# Patient Record
Sex: FEMALE | Race: BLACK OR AFRICAN AMERICAN | NOT HISPANIC OR LATINO | Employment: OTHER | URBAN - METROPOLITAN AREA
[De-identification: names, ages, dates, MRNs, and addresses within clinical notes are randomized per-mention and may not be internally consistent; named-entity substitution may affect disease eponyms.]

---

## 2017-10-02 ENCOUNTER — ALLSCRIPTS OFFICE VISIT (OUTPATIENT)
Dept: OTHER | Facility: OTHER | Age: 82
End: 2017-10-02

## 2017-10-02 DIAGNOSIS — E11.9 TYPE 2 DIABETES MELLITUS WITHOUT COMPLICATIONS (HCC): ICD-10-CM

## 2017-10-02 DIAGNOSIS — E78.5 HYPERLIPIDEMIA: ICD-10-CM

## 2017-10-02 DIAGNOSIS — Z12.11 ENCOUNTER FOR SCREENING FOR MALIGNANT NEOPLASM OF COLON: ICD-10-CM

## 2017-10-02 DIAGNOSIS — Z12.31 ENCOUNTER FOR SCREENING MAMMOGRAM FOR MALIGNANT NEOPLASM OF BREAST: ICD-10-CM

## 2017-10-02 DIAGNOSIS — Z78.0 ASYMPTOMATIC MENOPAUSAL STATE: ICD-10-CM

## 2017-10-03 NOTE — PROGRESS NOTES
Assessment  1  Diabetes mellitus, type 2 (250 00) (E11 9)   2  Benign essential hypertension (401 1) (I10)   3  Hyperlipidemia (272 4) (E78 5)   4  Obesity (278 00) (E66 9)   5  Asymptomatic postmenopausal state (V49 81) (Z78 0)   6  Screening for colon cancer (V76 51) (Z12 11)   7  Encounter for screening mammogram for breast cancer (V76 12) (Z12 31)    Plan  Asymptomatic postmenopausal state    · * DXA BONE DENSITY SPINE HIP AND PELVIS; Status:Hold For - Scheduling; Requested  for:02Oct2017;   Diabetes mellitus, type 2    · Skye Petty MD, Poon[facility] Love (Ophthalmology) Co-Management  *  Status: Hold For -  Scheduling  Requested for: 60FUT7092  Care Summary provided  : Yes   · (1) CBC/PLT/DIFF; Status:Active; Requested CKP:85VTT4053;    · (1) COMPREHENSIVE METABOLIC PANEL; Status:Active; Requested PJZ:15GQK4805;    · (1) HEMOGLOBIN A1C; Status:Active; Requested for:02Oct2017;    · (1) MICROALBUMIN CREATININE RATIO, RANDOM URINE; Status:Active; Requested  EAF:87EFQ4777;   Encounter for screening mammogram for breast cancer    · * MAMMO SCREENING BILATERAL W CAD; Status:Hold For - Scheduling; Requested  for:02Oct2017;   Hyperlipidemia    · (1) LIPID PANEL, FASTING; Status:Active; Requested for:02Oct2017;    · (1) TSH; Status:Active; Requested UWI:47XHL2570;   Need for influenza vaccination    · Fluzone High-Dose 0 5 ML Intramuscular Suspension Prefilled Syringe  Obesity    · Keep a diary of when and what you eat ; Status:Complete;   Done: 97FOK9580   · Some eating tips that can help you lose weight ; Status:Complete;   Done: 34WVF3808   · We recommend that you bring your body mass index down to 26 ; Status:Complete;    Done: 24DBP4506  Screening for colon cancer    · (1) OCCULT BLOOD, FECAL IMMUNOCHEMICAL TEST; Status:Active; Requested  QFY:[id number];   Screening for genitourinary condition    · *VB - Urinary Incontinence Screen (Dx Z13 89 Screen for UI);  Status:Complete;   Done:  85LLS9632 01: 14PM    Discussion/Summary  Discussion Summary:   Type 2 diabetesthe patient says that her blood sugars are well controlled on the metformin and that her last hemoglobin A1c was around 6 4  Continue the same medication  Follow-up hemoglobin A1c level  pressure stable, continue same medication  pravastatin, follow-up lipid profile  was told to cut down on the calories and increase her activity  was ordered  Hemoccult was ordered for colon cancer screening  Counseling Documentation With Imm: The patient was counseled regarding instructions for management,-risk factor reductions,-risks and benefits of treatment options,-importance of compliance with treatment  Medication SE Review and Pt Understands Tx: Possible side effects of new medications were reviewed with the patient/guardian today  The treatment plan was reviewed with the patient/guardian  The patient/guardian understands and agrees with the treatment plan      Chief Complaint  Chief Complaint Free Text Note Form: establish care      Advance Directives  Advance Directive [facility]:   NO - Patient does not have an advance health care directive  History of Present Illness  Diabetes Type II (Follow-Up): The patient states she has been stable with her Type II Diabetes control since the last visit  Comorbid Illnesses: hypertension,-hyperlipidemia-and-obesity  She has no known diabetic complications  She has no significant interval events  Symptoms: The patient is currently asymptomatic  Medications: the patient is adherent with her medication regimen -She denies medication side effects  Obesity (Follow-Up): The patient is being seen for follow-up of obesity  The patient reports no change in the condition  She has had no significant interval events  The patient is currently asymptomatic  The patient is not currently on medication for this problem  Hyperlipidemia (Follow-Up): The patient states her hyperlipidemia has been stable since the last visit  Comorbid Illnesses: diabetes mellitus-and-hypertension  She has no significant interval events  Symptoms: The patient is currently asymptomatic  Medications: She denies medication side effects  Hypertension (Follow-Up): The patient presents for follow-up of essential hypertension  The patient states she has been stable with her blood pressure control since the last visit  She has no comorbid illnesses  She has no significant interval events  Symptoms: The patient is currently asymptomatic  Medications: the patient is adherent with her medication regimen -She denies medication side effects  Review of Systems  Complete-Female:   Constitutional: No fever, no chills, feels well, no tiredness, no recent weight gain or weight loss  Eyes: No complaints of eye pain, no red eyes, no eyesight problems, no discharge, no dry eyes, no itching of eyes  ENT: no complaints of earache, no loss of hearing, no nose bleeds, no nasal discharge, no sore throat, no hoarseness  Cardiovascular: No complaints of slow heart rate, no fast heart rate, no chest pain, no palpitations, no leg claudication, no lower extremity edema  Respiratory: No complaints of shortness of breath, no wheezing, no cough, no SOB on exertion, no orthopnea, no PND  Gastrointestinal: No complaints of abdominal pain, no constipation, no nausea or vomiting, no diarrhea, no bloody stools  Genitourinary: No complaints of dysuria, no incontinence, no pelvic pain, no dysmenorrhea, no vaginal discharge or bleeding  Musculoskeletal: No complaints of arthralgias, no myalgias, no joint swelling or stiffness, no limb pain or swelling  Integumentary: No complaints of skin rash or lesions, no itching, no skin wounds, no breast pain or lump  Neurological: No complaints of headache, no confusion, no convulsions, no numbness, no dizziness or fainting, no tingling, no limb weakness, no difficulty walking     Psychiatric: Not suicidal, no sleep disturbance, no anxiety or depression, no change in personality, no emotional problems  Endocrine: No complaints of proptosis, no hot flashes, no muscle weakness, no deepening of the voice, no feelings of weakness  Hematologic/Lymphatic: No complaints of swollen glands, no swollen glands in the neck, does not bleed easily, does not bruise easily  Active Problems  1  Diabetes mellitus, type 2 (250 00) (E11 9)   2  Need for influenza vaccination (V04 81) (Z23)   3  No advance directive on file (V49 89) (Z78 9)   4  Screening for genitourinary condition (V81 6) (Z13 89)    Past Medical History  1  History of Denial (799 29) (R45 89)  Active Problems And Past Medical History Reviewed: The active problems and past medical history were reviewed and updated today  Surgical History  1  History of  Section   2  History of Cholecystectomy   3  History of Neck Surgery  Surgical History Reviewed: The surgical history was reviewed and updated today  Family History  Family History    1  No pertinent family history  Family History Reviewed: The family history was reviewed and updated today  Social History   · Never a smoker   · No advance directive on file (V49 89) (Z78 9)   · No alcohol use   · Non-tobacco user (V49 89) (Z78 9)  Social History Reviewed: The social history was reviewed and updated today  The social history was reviewed and is unchanged  Current Meds   1  Calcium 600/Vitamin D 600-400 MG-UNIT Oral Tablet; Therapy: 69DER2435 to Recorded   2  HydroCHLOROthiazide 12 5 MG Oral Tablet; Therapy: 40POE6689 to Recorded   3  Lisinopril 40 MG Oral Tablet; Therapy: 33IXL0383 to Recorded   4  MetFORMIN HCl  MG Oral Tablet Extended Release 24 Hour; take one tablet by   mouth twice daily; Therapy: 61NWD6500 to (Renew:97Cnm1311) Recorded   5  Metoprolol Succinate  MG Oral Tablet Extended Release 24 Hour; Therapy: 70VMA5468 to Recorded   6   Pravastatin Sodium 20 MG Oral Tablet; Therapy: 58PLV6856 to Recorded    Allergies  1  No Known Drug Allergies  2  No Known Environmental Allergies   3  No Known Food Allergies    Vitals  Signs   Recorded: 37SVW9649 01:11PM   Heart Rate: 75  Systolic: 764, LUE, Sitting  Diastolic: 90, LUE, Sitting  Height: 5 ft 2 in  Weight: 165 lb   BMI Calculated: 30 18  BSA Calculated: 1 76  O2 Saturation: 98    Physical Exam    Constitutional   General appearance: Abnormal   obese  Head and Face   Head and face: Normal     Palpation of the face and sinuses: No sinus tenderness  Eyes   Conjunctiva and lids: No swelling, erythema or discharge  Pupils and irises: Equal, round, reactive to light  Ears, Nose, Mouth, and Throat   External inspection of ears and nose: Normal     Otoscopic examination: Tympanic membranes translucent with normal light reflex  Canals patent without erythema  Oropharynx: Normal with no erythema, edema, exudate or lesions  Neck   Neck: Supple, symmetric, trachea midline, no masses  Thyroid: Normal, no thyromegaly  Pulmonary   Respiratory effort: No increased work of breathing or signs of respiratory distress  Auscultation of lungs: Clear to auscultation  Cardiovascular   Palpation of heart: Normal PMI, no thrills  Auscultation of heart: Normal rate and rhythm, normal S1 and S2, no murmurs  Examination of extremities for edema and/or varicosities: Normal     Abdomen   Abdomen: Non-tender, no masses  Liver and spleen: No hepatomegaly or splenomegaly  Lymphatic   Palpation of lymph nodes in neck: No lymphadenopathy  Musculoskeletal   Gait and station: Normal     Skin   Skin and subcutaneous tissue: Normal without rashes or lesions  Neurologic   Cranial nerves: Cranial nerves II-XII intact  Cortical function: Normal mental status  Reflexes: 2+ and symmetric  Sensation: No sensory loss      Psychiatric   Judgment and insight: Normal     Mood and affect: Normal  Results/Data  Falls Risk Assessment (Dx Z13 89 Screen for Neurologic Disorder) [id number] 01:14PM User, Ahs     Test Name Result Flag Reference   Falls Risk      No falls in the past year     *VB - Urinary Incontinence Screen (Dx Z13 89 Screen for UI) 00DCY1162 01:14PM Dwight Huitron     Test Name Result Flag Reference   Urinary Incontinence Assessment      no per pt   sh 10/02/2017     PHQ-2 Adult Depression Screening 50LBK6615 01:13PM User, Ahs     Test Name Result Flag Reference   PHQ-2 Adult Depression Score 0     Over the last two weeks, how often have you been bothered by any of the following problems? Little interest or pleasure in doing things: Not at all - 0  Feeling down, depressed, or hopeless: Not at all - 0   PHQ-2 Adult Depression Screening Negative         Future Appointments    Date/Time Provider Specialty Site   02/05/2018 09:45 AM JOHN Barnett   Internal Medicine [facility]     Signatures   Electronically signed by : JOHN Pettit ; Oct  2 2017  5:05PM EST                       (Author)

## 2017-10-14 ENCOUNTER — APPOINTMENT (OUTPATIENT)
Dept: LAB | Facility: HOSPITAL | Age: 82
End: 2017-10-14
Payer: MEDICARE

## 2017-10-14 ENCOUNTER — TRANSCRIBE ORDERS (OUTPATIENT)
Dept: ADMINISTRATIVE | Facility: HOSPITAL | Age: 82
End: 2017-10-14

## 2017-10-14 DIAGNOSIS — E11.9 TYPE 2 DIABETES MELLITUS WITHOUT COMPLICATIONS (HCC): ICD-10-CM

## 2017-10-14 DIAGNOSIS — E78.5 HYPERLIPIDEMIA: ICD-10-CM

## 2017-10-14 LAB
ALBUMIN SERPL BCP-MCNC: 3.7 G/DL (ref 3.5–5)
ALP SERPL-CCNC: 63 U/L (ref 46–116)
ALT SERPL W P-5'-P-CCNC: 17 U/L (ref 12–78)
ANION GAP SERPL CALCULATED.3IONS-SCNC: 7 MMOL/L (ref 4–13)
AST SERPL W P-5'-P-CCNC: 9 U/L (ref 5–45)
BASOPHILS # BLD AUTO: 0.03 THOUSANDS/ΜL (ref 0–0.1)
BASOPHILS NFR BLD AUTO: 0 % (ref 0–1)
BILIRUB SERPL-MCNC: 0.5 MG/DL (ref 0.2–1)
BUN SERPL-MCNC: 14 MG/DL (ref 5–25)
CALCIUM SERPL-MCNC: 9.9 MG/DL (ref 8.3–10.1)
CHLORIDE SERPL-SCNC: 101 MMOL/L (ref 100–108)
CHOLEST SERPL-MCNC: 125 MG/DL (ref 50–200)
CO2 SERPL-SCNC: 32 MMOL/L (ref 21–32)
CREAT SERPL-MCNC: 0.85 MG/DL (ref 0.6–1.3)
CREAT UR-MCNC: 49.8 MG/DL
EOSINOPHIL # BLD AUTO: 0.1 THOUSAND/ΜL (ref 0–0.61)
EOSINOPHIL NFR BLD AUTO: 1 % (ref 0–6)
ERYTHROCYTE [DISTWIDTH] IN BLOOD BY AUTOMATED COUNT: 15.6 % (ref 11.6–15.1)
EST. AVERAGE GLUCOSE BLD GHB EST-MCNC: 137 MG/DL
GFR SERPL CREATININE-BSD FRML MDRD: 74 ML/MIN/1.73SQ M
GLUCOSE P FAST SERPL-MCNC: 104 MG/DL (ref 65–99)
HBA1C MFR BLD: 6.4 % (ref 4.2–6.3)
HCT VFR BLD AUTO: 41.2 % (ref 34.8–46.1)
HDLC SERPL-MCNC: 46 MG/DL (ref 40–60)
HGB BLD-MCNC: 13.4 G/DL (ref 11.5–15.4)
LDLC SERPL CALC-MCNC: 52 MG/DL (ref 0–100)
LYMPHOCYTES # BLD AUTO: 2.67 THOUSANDS/ΜL (ref 0.6–4.47)
LYMPHOCYTES NFR BLD AUTO: 23 % (ref 14–44)
MCH RBC QN AUTO: 25 PG (ref 26.8–34.3)
MCHC RBC AUTO-ENTMCNC: 32.5 G/DL (ref 31.4–37.4)
MCV RBC AUTO: 77 FL (ref 82–98)
MICROALBUMIN UR-MCNC: 138 MG/L (ref 0–20)
MICROALBUMIN/CREAT 24H UR: 277 MG/G CREATININE (ref 0–30)
MONOCYTES # BLD AUTO: 0.75 THOUSAND/ΜL (ref 0.17–1.22)
MONOCYTES NFR BLD AUTO: 6 % (ref 4–12)
NEUTROPHILS # BLD AUTO: 8.06 THOUSANDS/ΜL (ref 1.85–7.62)
NEUTS SEG NFR BLD AUTO: 69 % (ref 43–75)
NRBC BLD AUTO-RTO: 0 /100 WBCS
PLATELET # BLD AUTO: 315 THOUSANDS/UL (ref 149–390)
PMV BLD AUTO: 8.5 FL (ref 8.9–12.7)
POTASSIUM SERPL-SCNC: 4 MMOL/L (ref 3.5–5.3)
PROT SERPL-MCNC: 8.3 G/DL (ref 6.4–8.2)
RBC # BLD AUTO: 5.37 MILLION/UL (ref 3.81–5.12)
SODIUM SERPL-SCNC: 140 MMOL/L (ref 136–145)
TRIGL SERPL-MCNC: 134 MG/DL
TSH SERPL DL<=0.05 MIU/L-ACNC: 0.03 UIU/ML (ref 0.36–3.74)
WBC # BLD AUTO: 11.66 THOUSAND/UL (ref 4.31–10.16)

## 2017-10-14 PROCEDURE — 83036 HEMOGLOBIN GLYCOSYLATED A1C: CPT

## 2017-10-14 PROCEDURE — 36415 COLL VENOUS BLD VENIPUNCTURE: CPT

## 2017-10-14 PROCEDURE — 80061 LIPID PANEL: CPT

## 2017-10-14 PROCEDURE — 82043 UR ALBUMIN QUANTITATIVE: CPT

## 2017-10-14 PROCEDURE — 85025 COMPLETE CBC W/AUTO DIFF WBC: CPT

## 2017-10-14 PROCEDURE — 84443 ASSAY THYROID STIM HORMONE: CPT

## 2017-10-14 PROCEDURE — 82570 ASSAY OF URINE CREATININE: CPT

## 2017-10-14 PROCEDURE — 80053 COMPREHEN METABOLIC PANEL: CPT

## 2017-10-30 ENCOUNTER — GENERIC CONVERSION - ENCOUNTER (OUTPATIENT)
Dept: OTHER | Facility: OTHER | Age: 82
End: 2017-10-30

## 2017-11-11 ENCOUNTER — TRANSCRIBE ORDERS (OUTPATIENT)
Dept: ADMINISTRATIVE | Facility: HOSPITAL | Age: 82
End: 2017-11-11

## 2017-11-11 ENCOUNTER — APPOINTMENT (OUTPATIENT)
Dept: LAB | Facility: HOSPITAL | Age: 82
End: 2017-11-11
Payer: MEDICARE

## 2017-11-11 DIAGNOSIS — R94.6 ABNORMAL RESULTS OF THYROID FUNCTION STUDIES: ICD-10-CM

## 2017-11-11 DIAGNOSIS — D72.829 ELEVATED WHITE BLOOD CELL COUNT: ICD-10-CM

## 2017-11-11 LAB
BASOPHILS # BLD AUTO: 0.01 THOUSANDS/ΜL (ref 0–0.1)
BASOPHILS NFR BLD AUTO: 0 % (ref 0–1)
EOSINOPHIL # BLD AUTO: 0.11 THOUSAND/ΜL (ref 0–0.61)
EOSINOPHIL NFR BLD AUTO: 1 % (ref 0–6)
ERYTHROCYTE [DISTWIDTH] IN BLOOD BY AUTOMATED COUNT: 15.8 % (ref 11.6–15.1)
HCT VFR BLD AUTO: 41.2 % (ref 34.8–46.1)
HGB BLD-MCNC: 13.3 G/DL (ref 11.5–15.4)
LYMPHOCYTES # BLD AUTO: 2.04 THOUSANDS/ΜL (ref 0.6–4.47)
LYMPHOCYTES NFR BLD AUTO: 20 % (ref 14–44)
MCH RBC QN AUTO: 24.7 PG (ref 26.8–34.3)
MCHC RBC AUTO-ENTMCNC: 32.3 G/DL (ref 31.4–37.4)
MCV RBC AUTO: 76 FL (ref 82–98)
MONOCYTES # BLD AUTO: 0.69 THOUSAND/ΜL (ref 0.17–1.22)
MONOCYTES NFR BLD AUTO: 7 % (ref 4–12)
NEUTROPHILS # BLD AUTO: 7.47 THOUSANDS/ΜL (ref 1.85–7.62)
NEUTS SEG NFR BLD AUTO: 72 % (ref 43–75)
NRBC BLD AUTO-RTO: 0 /100 WBCS
PLATELET # BLD AUTO: 314 THOUSANDS/UL (ref 149–390)
PMV BLD AUTO: 8.9 FL (ref 8.9–12.7)
RBC # BLD AUTO: 5.39 MILLION/UL (ref 3.81–5.12)
TSH SERPL DL<=0.05 MIU/L-ACNC: 0.04 UIU/ML (ref 0.36–3.74)
WBC # BLD AUTO: 10.35 THOUSAND/UL (ref 4.31–10.16)

## 2017-11-11 PROCEDURE — 84443 ASSAY THYROID STIM HORMONE: CPT

## 2017-11-11 PROCEDURE — 36415 COLL VENOUS BLD VENIPUNCTURE: CPT

## 2017-11-11 PROCEDURE — 85025 COMPLETE CBC W/AUTO DIFF WBC: CPT

## 2017-11-13 ENCOUNTER — HOSPITAL ENCOUNTER (OUTPATIENT)
Dept: MAMMOGRAPHY | Facility: CLINIC | Age: 82
Discharge: HOME | End: 2017-11-13
Payer: MEDICARE

## 2017-11-13 DIAGNOSIS — Z12.31 ENCOUNTER FOR SCREENING MAMMOGRAM FOR MALIGNANT NEOPLASM OF BREAST: ICD-10-CM

## 2017-11-13 DIAGNOSIS — Z78.0 ASYMPTOMATIC MENOPAUSAL STATE: ICD-10-CM

## 2017-11-13 PROCEDURE — G0202 SCR MAMMO BI INCL CAD: HCPCS

## 2017-11-13 PROCEDURE — 77063 BREAST TOMOSYNTHESIS BI: CPT

## 2017-11-13 PROCEDURE — 77080 DXA BONE DENSITY AXIAL: CPT

## 2017-11-16 DIAGNOSIS — R94.6 ABNORMAL RESULTS OF THYROID FUNCTION STUDIES: ICD-10-CM

## 2017-11-16 DIAGNOSIS — D72.829 ELEVATED WHITE BLOOD CELL COUNT: ICD-10-CM

## 2018-01-06 ENCOUNTER — APPOINTMENT (OUTPATIENT)
Dept: LAB | Facility: HOSPITAL | Age: 83
End: 2018-01-06
Payer: MEDICARE

## 2018-01-06 ENCOUNTER — TRANSCRIBE ORDERS (OUTPATIENT)
Dept: ADMINISTRATIVE | Facility: HOSPITAL | Age: 83
End: 2018-01-06

## 2018-01-06 DIAGNOSIS — Z12.11 ENCOUNTER FOR SCREENING FOR MALIGNANT NEOPLASM OF COLON: ICD-10-CM

## 2018-01-06 LAB — HEMOCCULT STL QL IA: NEGATIVE

## 2018-01-06 PROCEDURE — G0328 FECAL BLOOD SCRN IMMUNOASSAY: HCPCS

## 2018-01-13 VITALS
DIASTOLIC BLOOD PRESSURE: 90 MMHG | WEIGHT: 165 LBS | OXYGEN SATURATION: 98 % | HEART RATE: 75 BPM | BODY MASS INDEX: 30.36 KG/M2 | HEIGHT: 62 IN | SYSTOLIC BLOOD PRESSURE: 130 MMHG

## 2018-01-29 RX ORDER — HYDROCHLOROTHIAZIDE 12.5 MG/1
1 TABLET ORAL DAILY
COMMUNITY
Start: 2017-10-02 | End: 2018-03-14 | Stop reason: SDUPTHER

## 2018-01-29 RX ORDER — PRAVASTATIN SODIUM 20 MG
1 TABLET ORAL DAILY
COMMUNITY
Start: 2017-10-02 | End: 2018-03-14 | Stop reason: SDUPTHER

## 2018-01-29 RX ORDER — METFORMIN HYDROCHLORIDE 500 MG/1
1 TABLET, EXTENDED RELEASE ORAL 2 TIMES DAILY
COMMUNITY
Start: 2017-10-02 | End: 2018-03-14 | Stop reason: SDUPTHER

## 2018-01-29 RX ORDER — LISINOPRIL 40 MG/1
TABLET ORAL
COMMUNITY
Start: 2017-10-02 | End: 2018-03-14 | Stop reason: SDUPTHER

## 2018-01-29 RX ORDER — METOPROLOL SUCCINATE 100 MG/1
1 TABLET, EXTENDED RELEASE ORAL DAILY
COMMUNITY
Start: 2017-10-02 | End: 2018-03-14 | Stop reason: SDUPTHER

## 2018-01-30 ENCOUNTER — OFFICE VISIT (OUTPATIENT)
Dept: INTERNAL MEDICINE CLINIC | Facility: CLINIC | Age: 83
End: 2018-01-30
Payer: MEDICARE

## 2018-01-30 VITALS
SYSTOLIC BLOOD PRESSURE: 142 MMHG | BODY MASS INDEX: 28.97 KG/M2 | HEIGHT: 62 IN | RESPIRATION RATE: 14 BRPM | DIASTOLIC BLOOD PRESSURE: 86 MMHG | TEMPERATURE: 98.2 F | HEART RATE: 58 BPM | WEIGHT: 157.4 LBS

## 2018-01-30 DIAGNOSIS — E13.8 DIABETES MELLITUS OF OTHER TYPE WITH COMPLICATION, UNSPECIFIED LONG TERM INSULIN USE STATUS: ICD-10-CM

## 2018-01-30 DIAGNOSIS — Z00.00 MEDICARE ANNUAL WELLNESS VISIT, INITIAL: Primary | ICD-10-CM

## 2018-01-30 DIAGNOSIS — I10 BENIGN ESSENTIAL HYPERTENSION: ICD-10-CM

## 2018-01-30 DIAGNOSIS — E78.5 HYPERLIPIDEMIA, UNSPECIFIED HYPERLIPIDEMIA TYPE: ICD-10-CM

## 2018-01-30 DIAGNOSIS — K52.9 COLITIS: ICD-10-CM

## 2018-01-30 PROBLEM — E66.9 OBESITY: Status: ACTIVE | Noted: 2017-10-02

## 2018-01-30 PROBLEM — E66.9 OBESITY: Status: RESOLVED | Noted: 2017-10-02 | Resolved: 2018-01-30

## 2018-01-30 PROBLEM — D72.829 LEUKOCYTOSIS: Status: ACTIVE | Noted: 2017-10-16

## 2018-01-30 PROBLEM — R79.89 ABNORMAL TSH: Status: ACTIVE | Noted: 2017-10-16

## 2018-01-30 PROBLEM — E11.9 DIABETES MELLITUS, TYPE 2 (HCC): Status: ACTIVE | Noted: 2017-10-02

## 2018-01-30 PROCEDURE — 99214 OFFICE O/P EST MOD 30 MIN: CPT | Performed by: INTERNAL MEDICINE

## 2018-01-30 PROCEDURE — G0438 PPPS, INITIAL VISIT: HCPCS | Performed by: INTERNAL MEDICINE

## 2018-01-30 RX ORDER — METRONIDAZOLE 500 MG/1
500 TABLET ORAL 4 TIMES DAILY
Status: ON HOLD | COMMUNITY
End: 2018-03-01 | Stop reason: ALTCHOICE

## 2018-01-30 RX ORDER — CIPROFLOXACIN 500 MG/1
500 TABLET, FILM COATED ORAL EVERY 12 HOURS SCHEDULED
Status: ON HOLD | COMMUNITY
End: 2018-03-01 | Stop reason: ALTCHOICE

## 2018-01-30 RX ORDER — OXYCODONE HYDROCHLORIDE AND ACETAMINOPHEN 5; 325 MG/1; MG/1
1 TABLET ORAL EVERY 4 HOURS PRN
COMMUNITY
End: 2018-01-30 | Stop reason: ALTCHOICE

## 2018-01-30 RX ORDER — FAMOTIDINE 20 MG/1
20 TABLET, FILM COATED ORAL DAILY
COMMUNITY
End: 2018-06-05

## 2018-01-30 RX ORDER — ONDANSETRON 4 MG/1
4 TABLET, FILM COATED ORAL EVERY 8 HOURS PRN
COMMUNITY
End: 2018-01-30 | Stop reason: ALTCHOICE

## 2018-01-30 NOTE — PROGRESS NOTES
INTERNAL MEDICINE OFFICE VISIT  [facility] AT [address]  Tel: [phone]      NAME: Mark Mock  AGE: 81 y o  SEX: female  : 1935   MRN: [id number]    DATE: 2018  TIME: 1:24 PM      Assessment and Plan:  1  Diabetes mellitus of other type with complication, unspecified long term insulin use status ([facility] )  Patient has been taking metformin  I will have to get blood work done to follow up with hemoglobin A1c level  - Diabetic foot exam; Future    2  Colitis-she was recently admitted to [facility] with a diagnosis of colitis which was diagnosed with a CT scan of the abdomen  She was started on metronidazole and Cipro for 10 days  She is still taking the medications  Her symptoms have resolved  She was referred to GI for further workup and possible colonoscopy  3   Hypertension- Her blood pressure is stable on present medications, continue the same  4   Hyperlipidemia-she is taking pravastatin but not consistently, will follow-up lipid profile  - Counseling Documentation: patient was counseled regarding: diagnostic results, instructions for management, risk factor reductions, risks and benefits of treatment options and importance of compliance with treatment  - Medication Side Effects: Adverse side effects of medications were reviewed with the patient/guardian today  Return for follow up visit in 4 months or earlier, if needed  Chief Complaint:  Chief Complaint   Patient presents with    Medicare Wellness Visit    Follow-up         History of Present Illness:   Type 2 diabetes-she has been taking metformin but does not watch her diet consistently  I will need to check the labs  Colitis-this is a recent diagnosis and she is still taking the antibiotics  Hypertension-blood pressure is stable on the present medications which she is taking inconsistently    Hyperlipidemia-she is taking the pravastatin but not regularly  Active Problem List:  Patient Active Problem List   Diagnosis    Diabetes mellitus, type 2 ([facility] )    Benign essential hypertension    Hyperlipidemia    Abnormal TSH    Obesity    Leukocytosis    Colitis         Past Medical History:  No past medical history on file  Past Surgical History:  Past Surgical History:   Procedure Laterality Date     SECTION      CHOLECYSTECTOMY      NECK SURGERY           Family History:  No family history on file  Social History:  Social History     Social History    Marital status:       Spouse name: N/A    Number of children: N/A    Years of education: N/A     Social History Main Topics    Smoking status: Never Smoker    Smokeless tobacco: Never Used    Alcohol use No    Drug use: Unknown    Sexual activity: Not Asked     Other Topics Concern    None     Social History Narrative    No advance directive on file         Allergies:  No Known Allergies      Medications:    Current Outpatient Prescriptions:     Calcium Carbonate-Vitamin D3 (CALCIUM 600/VITAMIN D) 600-400 MG-UNIT TABS, Take by mouth, Disp: , Rfl:     ciprofloxacin (CIPRO) 500 mg tablet, Take 500 mg by mouth every 12 (twelve) hours, Disp: , Rfl:     famotidine (PEPCID) 20 mg tablet, Take 20 mg by mouth daily, Disp: , Rfl:     hydrochlorothiazide (HYDRODIURIL) 12 5 mg tablet, Take 1 tablet by mouth daily, Disp: , Rfl:     lisinopril (ZESTRIL) 40 mg tablet, Take by mouth, Disp: , Rfl:     metFORMIN (GLUCOPHAGE-XR) 500 mg 24 hr tablet, Take 1 tablet by mouth 2 (two) times a day, Disp: , Rfl:     metoprolol succinate (TOPROL-XL) 100 mg 24 hr tablet, Take 1 tablet by mouth daily, Disp: , Rfl:     metroNIDAZOLE (FLAGYL) 500 mg tablet, Take 500 mg by mouth 4 (four) times a day, Disp: , Rfl:     pravastatin (PRAVACHOL) 20 mg tablet, Take 1 tablet by mouth daily, Disp: , Rfl:       The following portions of the patient's history were reviewed and updated as appropriate: past medical history, past surgical history, family history, social history, allergies, current medications and active problem list       Review of Systems:  Constitutional: Denies fever, chills, weight gain, weight loss, fatigue  Eyes: Denies eye redness, eye discharge, double vision, change in visual acuity  ENT: Denies hearing loss, tinnitus, sneezing, nasal congestion, nasal discharge, sore throat   Respiratory: Denies cough, expectoration, hemoptysis, shortness of breath, wheezing  Cardiovascular: Denies chest pain, palpitations, lower extremity swelling, orthopnea, PND  Gastrointestinal: Denies abdominal pain, heartburn, nausea, vomiting, hematemesis, diarrhea, bloody stools  Genito-Urinary: Denies dysuria, frequency, difficulty in micturition, nocturia, incontinence  Musculoskeletal: Denies back pain, joint pain, muscle pain  Neurologic: Denies confusion, lightheadedness, syncope, headache, focal weakness, sensory changes, seizures  Endocrine: Denies polyuria, polydipsia, temperature intolerance  Allergy and Immunology: Denies hives, insect bite sensitivity  Hematological and Lymphatic: Denies bleeding problems, swollen glands   Psychological: Denies depression, suicidal ideation, anxiety, panic, mood swings  Dermatological: Denies pruritus, rash, skin lesion changes      Vitals:  Vitals:    01/30/18 1254   BP: 142/86   Pulse: 58   Resp: 14   Temp: 98 2 °F (36 8 °C)       Body mass index is 28 79 kg/m²  Weight (last 2 days)     Date/Time   Weight    01/30/18 1254  71 4 (157 4)                Physical Examination:  General: Patient is not in acute distress  Awake, alert, responding to commands  No weight gain or loss  Head: Normocephalic  Atraumatic  Eyes: Conjunctiva and lids with no swelling, erythema or discharge  Both pupils normal sized, round and reactive to light   Sclera nonicteric  ENT: External examination of nose and ear normal  Otoscopic examination shows translucent tympanic membranes with patent canals without erythema  Oropharynx moist with no erythema, edema, exudate or lesions  Neck: Supple  JVP not raised  Trachea midline  No masses  No thyromegaly  Lungs: No signs of increased work of breathing or respiratory distress  Bilateral bronchovascular breath sounds with no crackles or rhonchi  Chest wall: No tenderness  Cardiovascular: Normal PMI  No thrills  Regular rate and rhythm  S1 and S2 normal  No murmur, rub or gallop  Gastrointestinal: Abdomen soft, nontender  No guarding or rigidity  Liver and spleen not palpable  Bowel sounds present  Neurologic: Cranial nerves II-XII intact  Cortical functions normal  Motor system - Reflexes 2+ and symmetrical  Sensations normal  Musculoskeletal: Gait normal  No joint tenderness  Integumentary: Skin normal with no rash or lesions  Lymphatic: No palpable lymph nodes in neck, axilla or groin  Extremities: No clubbing, cyanosis, edema or varicosities  Psychological: Judgement and insight normal  Mood and affect normal    Patient's shoes and socks removed  Right Foot/Ankle   Right Foot Inspection  Skin Exam: skin normal and skin intact no dry skin, no warmth, no callus, no erythema, no maceration, no abnormal color, no pre-ulcer, no ulcer and no callus                          Toe Exam: ROM and strength within normal limits  Sensory   Vibration: intact    Monofilament testing: intact  Vascular    The right DP pulse is 2+  The right PT pulse is 2+  Left Foot/Ankle  Left Foot Inspection  Skin Exam: skin normal and skin intactno dry skin, no warmth, no erythema, no maceration, normal color, no pre-ulcer, no ulcer and no callus                         Toe Exam: ROM and strength within normal limits                   Sensory   Vibration: intact    Monofilament: intact  Vascular    The left DP pulse is 2+  The left PT pulse is 2+     Assign Risk Category:  ; ;        Risk: 0    Laboratory Results:  CBC with diff:   Lab Results   Component Value Date    WBC 10 35 (H) 11/11/2017    RBC 5 39 (H) 11/11/2017    HGB 13 3 11/11/2017    HCT 41 2 11/11/2017    MCV 76 (L) 11/11/2017    MCH 24 7 (L) 11/11/2017    RDW 15 8 (H) 11/11/2017     11/11/2017       CMP:  Lab Results   Component Value Date    CREATININE 0 85 10/14/2017    BUN 14 10/14/2017     10/14/2017    K 4 0 10/14/2017     10/14/2017    CO2 32 10/14/2017    PROT 8 3 (H) 10/14/2017    ALKPHOS 63 10/14/2017    ALT 17 10/14/2017    AST 9 10/14/2017       Lab Results   Component Value Date    HGBA1C 6 4 (H) 10/14/2017       No results found for: TROPONINI, CKMB, CKTOTAL    Lipid Profile:   Lab Results   Component Value Date    CHOL 125 10/14/2017     Lab Results   Component Value Date    HDL 46 10/14/2017     Lab Results   Component Value Date    LDLCALC 52 10/14/2017     Lab Results   Component Value Date    TRIG 134 10/14/2017     Health Maintenance:  Health Maintenance   Topic Date Due    DTaP,Tdap,and Td Vaccines (1 - Tdap) 05/07/1942    Diabetic Foot Exam  05/07/1945    PNEUMOCOCCAL POLYSACCHARIDE VACCINE AGE 65 AND OVER  05/07/2001    GLAUCOMA SCREENING 67+ YR  05/07/2003    URINE MICROALBUMIN  10/14/2018    OPHTHALMOLOGY EXAM  10/30/2018    INFLUENZA VACCINE  Completed     Immunization History   Administered Date(s) Administered    Influenza Split High Dose Preservative Free IM 10/02/2017         Lawson Bruce MD  1/30/2018,1:24 PM

## 2018-01-30 NOTE — PROGRESS NOTES
Medicare Annual Wellness Visit  NAME: Marley Tenorio SEX: female : 1935  HPI:  Marley Tenorio is a 81 y o  female who presents to clinic today for a wellness visit  Last Medicare wellness visit information was reviewed, patient was interviewed , no changes since last AWV: no    Last Medicare wellness visit information was reviewed, patient interviewed and updates made to the record today: no    PATIENT CARE TEAM:  Patient Care Team:  Anabell Sifuentes MD as PCP - General    PAST MEDICAL HISTORY:  No past medical history on file  PAST SURGICAL HISTORY:  Past Surgical History:   Procedure Laterality Date     SECTION      CHOLECYSTECTOMY      NECK SURGERY         PROBLEM LIST:  Patient Active Problem List   Diagnosis    Diabetes mellitus, type 2 ([facility] [address] )    Benign essential hypertension    Hyperlipidemia    Abnormal TSH    Leukocytosis    Colitis       FAMILY HISTORY:  No family history on file  SOCIAL HISTORY:  Marley Tenorio  reports that she has never smoked  She has never used smokeless tobacco  She reports that she does not drink alcohol  Her drug history is not on file      ALLERGIES:  No Known Allergies    CURRENT MEDICATIONS:  Current Outpatient Prescriptions   Medication Sig Dispense Refill    Calcium Carbonate-Vitamin D3 (CALCIUM 600/VITAMIN D) 600-400 MG-UNIT TABS Take by mouth      ciprofloxacin (CIPRO) 500 mg tablet Take 500 mg by mouth every 12 (twelve) hours      famotidine (PEPCID) 20 mg tablet Take 20 mg by mouth daily      hydrochlorothiazide (HYDRODIURIL) 12 5 mg tablet Take 1 tablet by mouth daily      lisinopril (ZESTRIL) 40 mg tablet Take by mouth      metFORMIN (GLUCOPHAGE-XR) 500 mg 24 hr tablet Take 1 tablet by mouth 2 (two) times a day      metoprolol succinate (TOPROL-XL) 100 mg 24 hr tablet Take 1 tablet by mouth daily      metroNIDAZOLE (FLAGYL) 500 mg tablet Take 500 mg by mouth 4 (four) times a day      pravastatin (PRAVACHOL) 20 mg tablet Take 1 tablet by mouth daily       No current facility-administered medications for this visit          IMMUNIZATIONS:  Immunization History   Administered Date(s) Administered    Influenza Split High Dose Preservative Free IM 10/02/2017       HEALTH MAINTENANCE:  Health Maintenance   Topic Date Due    DTaP,Tdap,and Td Vaccines (1 - Tdap) 05/07/1943    Diabetic Foot Exam  05/07/1946    PNEUMOCOCCAL POLYSACCHARIDE VACCINE AGE 72 AND OVER  05/07/2001    GLAUCOMA SCREENING 67+ YR  05/07/2003    URINE MICROALBUMIN  10/14/2018    OPHTHALMOLOGY EXAM  10/30/2018    INFLUENZA VACCINE  Completed       PATIENT HEALTH RISK ASSESSMENT (HRA):  AWV Clinical     ISAR:   Previous hospitalizations?:  No       Once in a Lifetime Medicare Screening:   EKG performed?:  No    AAA screening performed? (if performed, please add date to Health Maintenance):  No       Medicare Screening Tests and Risk Assessment:   AAA Risk Assessment    Osteoporosis Risk Assessment    HIV Risk Assessment        Drug and Alcohol Use:   Tobacco use    Cigarettes:  never smoker    Smokeless:  never used smokeless tobacco    Tobacco use duration    Tobacco Cessation Readiness    Alcohol use    Alcohol use:  never    Alcohol Treatment Readiness   Illicit Drug Use    Drug use:  never        Diet & Exercise:   Diet   What is your diet?:  Limited junk food   How many servings a day of the following:   Exercise        Cognitive Impairment Screening:   Cognitive Impairment Screening        Functional Ability/Level of Safety:   Hearing    Hearing Impairment Assessment    Current Activities    Help needed with the folllowing:    ADL    Fall Risk   Injury History       Home Safety:   Home Safety Risk Factors       Advanced Directives:   Advanced Directives    Patient's End of Life Decisions        Urinary Incontinence:       Glaucoma:             VITALS:  /86 (BP Location: Left arm, Patient Position: Sitting, Cuff Size: Adult)   Pulse 58   Temp 98 2 °F (36 8 °C)   Resp 14   Ht 5' 2" (1 575 m)   Wt 71 4 kg (157 lb 6 4 oz)   BMI 28 79 kg/m²     MEDICARE SCREENING LABS:  Fasting glucose: No results found for: LABGLUC, Lipid panel:   Lab Results   Component Value Date/Time    CHOL 125 10/14/2017 08:19 AM    HDL 46 10/14/2017 08:19 AM    LDLCALC 52 10/14/2017 08:19 AM    TRIG 134 10/14/2017 08:19 AM       ASSESSMENT/PLAN:  1  Medicare annual wellness visit, initial    Health Maintenance Due   Topic Date Due    DTaP,Tdap,and Td Vaccines (1 - Tdap) 05/07/1943    Diabetic Foot Exam  05/07/1946    PNEUMOCOCCAL POLYSACCHARIDE VACCINE AGE 72 AND OVER  05/07/2001    GLAUCOMA SCREENING 67+ YR  05/07/2003       MEDICARE WELLNESS COUNSELING/DISCUSSION:  Provider Screening     Preventative Screening/Counseling:   Cardiovascular Screening/Counseling:   (Labs Q5 years, EKG optional one-time)   General:  Risks and Benefits Discussed           Diabetes Screening/Counseling:   (2 tests/year if Pre-Diabetes or 1 test/year if no Diabetes)   General:  Risks and Benefits Discussed           Colorectal Cancer Screening/Counseling:   (FOBT Q1 yr; Flex Sig Q4 yrs or Q10 yrs after Screening Colonoscopy; Screening Colonoscpy Q2 yrs High Risk or Q10 yrs Low Risk; Barium Enema Q2 yrs High Risk or Q4 yrs Low Risk)   General:  Screening Not Indicated           Prostate Cancer Screening/Counseling:   (Annual)          Breast Cancer Screening/Counseling:   (Baseline Age 28 - 43; Annual Age 36+)   General:  Screening Not Indicated          Cervical Cancer Screening/Counseling:   (Annual for High Risk or Childbearing Age with Abnormal Pap in Last 3 yrs;  Every 2 all others)   General:  Screening Not Indicated           Osteoporosis Screening/Counseling:   (Every 2 Yrs if at risk or more if medically necessary)   General:  Screening Not Indicated           AAA Screening/Counseling:   (Once per Lifetime with risk factors)    General:  Screening Not Indicated, Risks and Benefits Discussed           Glaucoma Screening/Counseling:   (Annual)   General:  Risks and Benefits Discussed          HIV Screening/Counseling:   (Voluntary; Once annually for high risk OR 3 times for Pregnancy at diagnosis of IUP; 3rd trimester; and at Labor   General:  Risks and Benefits Discussed           Hepatitis C Screening:   Hepatitis C Counseling Provided: Yes               Immunizations:        Other Preventative Couseling (Non-Medicare Wellness Visit Required):       Referrals (Non-Medicare Wellness Visit Required):       Medical Equipment/Suppliers:           Nir Castañeda MD  1/30/2018

## 2018-02-19 RX ORDER — ONDANSETRON 4 MG/1
TABLET, ORALLY DISINTEGRATING ORAL
Status: ON HOLD | COMMUNITY
Start: 2018-01-27 | End: 2018-03-01 | Stop reason: ALTCHOICE

## 2018-02-23 ENCOUNTER — OFFICE VISIT (OUTPATIENT)
Dept: GASTROENTEROLOGY | Facility: CLINIC | Age: 83
End: 2018-02-23
Payer: MEDICARE

## 2018-02-23 VITALS
WEIGHT: 154 LBS | BODY MASS INDEX: 28.34 KG/M2 | DIASTOLIC BLOOD PRESSURE: 90 MMHG | HEART RATE: 80 BPM | HEIGHT: 62 IN | SYSTOLIC BLOOD PRESSURE: 140 MMHG

## 2018-02-23 DIAGNOSIS — K52.9 COLITIS: Primary | ICD-10-CM

## 2018-02-23 DIAGNOSIS — R11.10 ABDOMINAL PAIN WITH VOMITING: ICD-10-CM

## 2018-02-23 DIAGNOSIS — R10.9 ABDOMINAL PAIN WITH VOMITING: ICD-10-CM

## 2018-02-23 DIAGNOSIS — R93.89 ABNORMAL CT SCAN: ICD-10-CM

## 2018-02-23 PROBLEM — R93.5 ABNORMAL CT OF THE ABDOMEN: Status: ACTIVE | Noted: 2018-02-23

## 2018-02-23 PROCEDURE — 99203 OFFICE O/P NEW LOW 30 MIN: CPT | Performed by: INTERNAL MEDICINE

## 2018-02-23 NOTE — LETTER
February 23, 2018     Otis Adler MD  [facility]    Patient: Colin Plaza   YOB: 1935   Date of Visit: 2/23/2018       Dear Dr Randi Masters:    Thank you for referring Colin Plaza to me for evaluation  Below are my notes for this consultation  If you have questions, please do not hesitate to call me  I look forward to following your patient along with you  Sincerely,        Maryjane Ewing MD        CC: No Recipients  Maryjane Ewing MD  2/23/2018 10:15 AM  Sign at close encounter  [facility]    Dear Yamil Wise,    I had the pleasure of seeing your patient Colin Plaza in the office today and I thank you for this kind referral        Chief Complaint:, abnormal CT scan Abdominal pain      HPI:  Colin Plaza is a 81y o  year old female who presents as a referral for abdominal pain with vomiting  Three weeks ago he was seen at [facility] ED for diffuse abdominal pain and vomiting  She had a CT scan which showed concern for colitis  She had heme negative stool test at that time  She notes at the time that she had no diarrhea  No fever or chills  No other associated symptomatology  The vomiting had actually resolved by the time she went to the emergency room  She described the abdominal pain is migrating up in down the center of her abdomen and occasionally disappearing for some minutes  Currently she is feeling well and her symptoms have resolved  She has not experienced any weight loss, fevers, or chills  Her last colonoscopy was performed several years ago, possibly more than 10     She has no known family history of colon cancer  Review of Systems:   Constitutional: Negative for appetite change, fatigue, fever and unexpected weight change  HENT: Negative for nosebleeds, trouble swallowing  Eyes: Negative for pain and visual disturbance     Respiratory: Negative for cough, choking, chest tightness and shortness of breath  Cardiovascular: Negative for chest pain, palpitations and leg swelling  Gastrointestinal: Negative for abdominal distention, abdominal pain, nausea, vomiting, diarrhea, constipation, rectal pain, anal bleeding, and blood in stool  Endocrine: Negative for cold intolerance, heat intolerance, polydipsia, polyphagia and polyuria  Genitourinary: Negative for difficulty urinating, flank pain, frequency and hematuria  Musculoskeletal: Negative for arthralgias, back pain, joint swelling, myalgias, and neck pain  Skin: Negative for color change and rash  Allergic/Immunologic: Negative for immunocompromised state  Neurological: Negative for dizziness, tremors, seizures, syncope, speech difficulty, weakness, light-headedness and headaches  Hematological: Negative for adenopathy  Does not bruise/bleed easily  Psychiatric/Behavioral: Negative for confusion and dysphoric mood  The patient is not nervous/anxious  Historical Information   No past medical history on file  Past Surgical History:   Procedure Laterality Date     SECTION      CHOLECYSTECTOMY      NECK SURGERY       Social History   History   Alcohol Use No     History   Drug use: Unknown     History   Smoking Status    Never Smoker   Smokeless Tobacco    Never Used     No family history on file  Current Medications: has a current medication list which includes the following prescription(s): calcium carbonate-vitamin d3, ciprofloxacin, famotidine, hydrochlorothiazide, lisinopril, metformin, metoprolol succinate, metronidazole, ondansetron, and pravastatin  Physical Exam:   Constitutional: Appears well developed, well nourished  Head: Normocephalic  Eyes: Conjunctivae and EOM are normal  Pupils are equal, round and reactive to light  ENT: Oropharynx is clear and moist    Neck: Normal range of motion  Neck supple  Cardiovascular: Normal rate, regular rhythm   Normal heart sounds and intact distal pulses  Pulmonary/Chest: Effort normal and breath sounds normal    Abdominal: Soft  Bowel sounds are normal    Musculoskeletal: Normal range of motion  Neurological: Alert and oriented  No nystagmus or asterixis  Skin: Warm and dry  Psychiatric: Behavior normal  Thought content normal      Labs:   Results Reviewed     None          X-Rays & Procedures:   No orders to display         ______________________________________________________________________      Assessment & Plan:          1  Diffuse abdominal pain  This is now resolved  2  Vomiting  This is now resolved  3   Abnormal CT scan of the abdomen      Given her symptoms and recent abnormal CT scan, plan for the above problems is to schedule colonoscopy  I will be happy to inform you of the results and any further recommendations  I would like to thank you for allowing me to participate in her care  With warmest regards,    Mely Huerta MD, [facility]         Attestation:   By signing my name below, I, [facility]EDDY [facility], attest that this documentation has been prepared under the direction and in the presence of Mely Huerta MD  Electronically Signed: [facility] Hospital[facility]audette  2/23/2018     I, Mely Huerta, personally performed the services described in this documentation  All medical record entries made by the scribe were at my direction and in my presence  I have reviewed the chart and discharge instructions and agree that the record reflects my personal performance and is accurate and complete   Mely Huerta MD   2/23/2018

## 2018-02-23 NOTE — PROGRESS NOTES
[facility]    Dear Ankit Zapien,    I had the pleasure of seeing your patient Jose Mcnally in the office today and I thank you for this kind referral        Chief Complaint:, abnormal CT scan Abdominal pain      HPI:  Jose Mcnally is a 81y o  year old female who presents as a referral for abdominal pain with vomiting  Three weeks ago he was seen at [facility] ED for diffuse abdominal pain and vomiting  She had a CT scan which showed concern for colitis  She had heme negative stool test at that time  She notes at the time that she had no diarrhea  No fever or chills  No other associated symptomatology  The vomiting had actually resolved by the time she went to the emergency room  She described the abdominal pain is migrating up in down the center of her abdomen and occasionally disappearing for some minutes  Currently she is feeling well and her symptoms have resolved  She has not experienced any weight loss, fevers, or chills  Her last colonoscopy was performed several years ago, possibly more than 10     She has no known family history of colon cancer  Review of Systems:   Constitutional: Negative for appetite change, fatigue, fever and unexpected weight change  HENT: Negative for nosebleeds, trouble swallowing  Eyes: Negative for pain and visual disturbance  Respiratory: Negative for cough, choking, chest tightness and shortness of breath  Cardiovascular: Negative for chest pain, palpitations and leg swelling  Gastrointestinal: Negative for abdominal distention, abdominal pain, nausea, vomiting, diarrhea, constipation, rectal pain, anal bleeding, and blood in stool  Endocrine: Negative for cold intolerance, heat intolerance, polydipsia, polyphagia and polyuria  Genitourinary: Negative for difficulty urinating, flank pain, frequency and hematuria  Musculoskeletal: Negative for arthralgias, back pain, joint swelling, myalgias, and neck pain     Skin: Negative for color change and rash  Allergic/Immunologic: Negative for immunocompromised state  Neurological: Negative for dizziness, tremors, seizures, syncope, speech difficulty, weakness, light-headedness and headaches  Hematological: Negative for adenopathy  Does not bruise/bleed easily  Psychiatric/Behavioral: Negative for confusion and dysphoric mood  The patient is not nervous/anxious  Historical Information   No past medical history on file  Past Surgical History:   Procedure Laterality Date     SECTION      CHOLECYSTECTOMY      NECK SURGERY       Social History   History   Alcohol Use No     History   Drug use: Unknown     History   Smoking Status    Never Smoker   Smokeless Tobacco    Never Used     No family history on file  Current Medications: has a current medication list which includes the following prescription(s): calcium carbonate-vitamin d3, ciprofloxacin, famotidine, hydrochlorothiazide, lisinopril, metformin, metoprolol succinate, metronidazole, ondansetron, and pravastatin  Physical Exam:   Constitutional: Appears well developed, well nourished  Head: Normocephalic  Eyes: Conjunctivae and EOM are normal  Pupils are equal, round and reactive to light  ENT: Oropharynx is clear and moist    Neck: Normal range of motion  Neck supple  Cardiovascular: Normal rate, regular rhythm  Normal heart sounds and intact distal pulses  Pulmonary/Chest: Effort normal and breath sounds normal    Abdominal: Soft  Bowel sounds are normal    Musculoskeletal: Normal range of motion  Neurological: Alert and oriented  No nystagmus or asterixis  Skin: Warm and dry  Psychiatric: Behavior normal  Thought content normal      Labs:   Results Reviewed     None          X-Rays & Procedures:   No orders to display         ______________________________________________________________________      Assessment & Plan:          1  Diffuse abdominal pain  This is now resolved  2  Vomiting  This is now resolved  3   Abnormal CT scan of the abdomen      Given her symptoms and recent abnormal CT scan, plan for the above problems is to schedule colonoscopy  I will be happy to inform you of the results and any further recommendations  I would like to thank you for allowing me to participate in her care  With warmest regards,    Benji Ellington MD, [facility]         Attestation:   By signing my name below, Donita KLEIN, attest that this documentation has been prepared under the direction and in the presence of Benji Ellington MD  Electronically Signed: Claudette Valdez  2/23/2018     Benji KLEIN, personally performed the services described in this documentation  All medical record entries made by the caitieibrickie were at my direction and in my presence  I have reviewed the chart and discharge instructions and agree that the record reflects my personal performance and is accurate and complete   Benji Ellington MD   2/23/2018

## 2018-02-28 ENCOUNTER — ANESTHESIA EVENT (OUTPATIENT)
Dept: PERIOP | Facility: HOSPITAL | Age: 83
End: 2018-02-28
Payer: MEDICARE

## 2018-03-01 ENCOUNTER — HOSPITAL ENCOUNTER (OUTPATIENT)
Facility: HOSPITAL | Age: 83
Setting detail: OUTPATIENT SURGERY
Discharge: HOME | End: 2018-03-01
Attending: INTERNAL MEDICINE | Admitting: INTERNAL MEDICINE
Payer: MEDICARE

## 2018-03-01 ENCOUNTER — ANESTHESIA (OUTPATIENT)
Dept: PERIOP | Facility: HOSPITAL | Age: 83
End: 2018-03-01
Payer: MEDICARE

## 2018-03-01 VITALS
SYSTOLIC BLOOD PRESSURE: 143 MMHG | WEIGHT: 151.4 LBS | DIASTOLIC BLOOD PRESSURE: 63 MMHG | RESPIRATION RATE: 20 BRPM | HEIGHT: 62 IN | BODY MASS INDEX: 27.86 KG/M2 | TEMPERATURE: 97.8 F | HEART RATE: 79 BPM | OXYGEN SATURATION: 97 %

## 2018-03-01 DIAGNOSIS — R10.9 ABDOMINAL PAIN WITH VOMITING: ICD-10-CM

## 2018-03-01 DIAGNOSIS — R11.10 ABDOMINAL PAIN WITH VOMITING: ICD-10-CM

## 2018-03-01 DIAGNOSIS — R93.5 ABNORMAL CT OF THE ABDOMEN: ICD-10-CM

## 2018-03-01 LAB — GLUCOSE SERPL-MCNC: 143 MG/DL (ref 65–140)

## 2018-03-01 PROCEDURE — 82948 REAGENT STRIP/BLOOD GLUCOSE: CPT

## 2018-03-01 PROCEDURE — 88305 TISSUE EXAM BY PATHOLOGIST: CPT | Performed by: INTERNAL MEDICINE

## 2018-03-01 PROCEDURE — 45385 COLONOSCOPY W/LESION REMOVAL: CPT | Performed by: INTERNAL MEDICINE

## 2018-03-01 PROCEDURE — 88305 TISSUE EXAM BY PATHOLOGIST: CPT | Performed by: PATHOLOGY

## 2018-03-01 RX ORDER — LIDOCAINE HYDROCHLORIDE 10 MG/ML
INJECTION, SOLUTION INFILTRATION; PERINEURAL AS NEEDED
Status: DISCONTINUED | OUTPATIENT
Start: 2018-03-01 | End: 2018-03-01 | Stop reason: SURG

## 2018-03-01 RX ORDER — SODIUM CHLORIDE, SODIUM LACTATE, POTASSIUM CHLORIDE, CALCIUM CHLORIDE 600; 310; 30; 20 MG/100ML; MG/100ML; MG/100ML; MG/100ML
100 INJECTION, SOLUTION INTRAVENOUS CONTINUOUS
Status: DISCONTINUED | OUTPATIENT
Start: 2018-03-01 | End: 2018-03-01 | Stop reason: HOSPADM

## 2018-03-01 RX ORDER — PROPOFOL 10 MG/ML
INJECTION, EMULSION INTRAVENOUS AS NEEDED
Status: DISCONTINUED | OUTPATIENT
Start: 2018-03-01 | End: 2018-03-01 | Stop reason: SURG

## 2018-03-01 RX ADMIN — PROPOFOL 30 MG: 10 INJECTION, EMULSION INTRAVENOUS at 07:36

## 2018-03-01 RX ADMIN — PROPOFOL 100 MG: 10 INJECTION, EMULSION INTRAVENOUS at 07:33

## 2018-03-01 RX ADMIN — LIDOCAINE HYDROCHLORIDE 50 MG: 10 INJECTION, SOLUTION INFILTRATION; PERINEURAL at 07:33

## 2018-03-01 RX ADMIN — SODIUM CHLORIDE, SODIUM LACTATE, POTASSIUM CHLORIDE, AND CALCIUM CHLORIDE 100 ML/HR: .6; .31; .03; .02 INJECTION, SOLUTION INTRAVENOUS at 06:49

## 2018-03-01 RX ADMIN — PROPOFOL 30 MG: 10 INJECTION, EMULSION INTRAVENOUS at 07:40

## 2018-03-01 RX ADMIN — PROPOFOL 20 MG: 10 INJECTION, EMULSION INTRAVENOUS at 07:43

## 2018-03-01 NOTE — ANESTHESIA POSTPROCEDURE EVALUATION
Post-Op Assessment Note      CV Status:  Stable    Mental Status:  Somnolent    Hydration Status:  Euvolemic    PONV Controlled:  Controlled    Airway Patency:  Patent    Post Op Vitals Reviewed: Yes          Staff: CRNA           /57 (03/01/18 0748)    Temp 97 8 °F (36 6 °C) (03/01/18 0748)    Pulse 85 (03/01/18 0748)   Resp 22 (03/01/18 0748)    SpO2 96 % (03/01/18 0748)

## 2018-03-01 NOTE — DISCHARGE INSTRUCTIONS
Colonoscopy   WHAT YOU NEED TO KNOW:   A colonoscopy is a procedure to examine the inside of your colon (intestine) with a scope  Polyps or tissue growths may have been removed during your colonoscopy  It is normal to feel bloated and to have some abdominal discomfort  You should be passing gas  If you have hemorrhoids or you had polyps removed, you may have a small amount of bleeding  DISCHARGE INSTRUCTIONS:   Seek care immediately if:   · You have a large amount of bright red blood in your bowel movements  · Your abdomen is hard and firm and you have severe pain  · You have sudden trouble breathing  Contact your healthcare provider if:   · You develop a rash or hives  · You have a fever within 24 hours of your procedure  · You have not had a bowel movement for 3 days after your procedure  · You have questions or concerns about your condition or care  Activity:   · Do not lift, strain, or run  for 3 days after your procedure  · Rest after your procedure  You have been given medicine to relax you  Do not  drive or make important decisions until the day after your procedure  Return to your normal activity as directed  · Relieve gas and discomfort from bloating  by lying on your right side with a heating pad on your abdomen  You may need to take short walks to help the gas move out  Eat small meals until bloating is relieved  If you had polyps removed: For 7 days after your procedure:  · Do not  take aspirin  · Do not  go on long car rides  Help prevent constipation:   · Eat a variety of healthy foods  Healthy foods include fruit, vegetables, whole-grain breads, low-fat dairy products, beans, lean meat, and fish  Ask if you need to be on a special diet  Your healthcare provider may recommend that you eat high-fiber foods such as cooked beans  Fiber helps you have regular bowel movements  · Drink liquids as directed    Adults should drink between 9 and 13 eight-ounce cups of liquid every day  Ask what amount is best for you  For most people, good liquids to drink are water, juice, and milk  · Exercise as directed  Talk to your healthcare provider about the best exercise plan for you  Exercise can help prevent constipation, decrease your blood pressure and improve your health  Follow up with your healthcare provider as directed:  Write down your questions so you remember to ask them during your visits  © 2017 [employer] Information is for End User's use only and may not be sold, redistributed or otherwise used for commercial purposes  All illustrations and images included in CareNotes® are the copyrighted property of [employer]  or Tanner Ley  The above information is an  only  It is not intended as medical advice for individual conditions or treatments  Talk to your doctor, nurse or pharmacist before following any medical regimen to see if it is safe and effective for you

## 2018-03-01 NOTE — OP NOTE
**** GI/ENDOSCOPY REPORT ****     PATIENT NAME: Zuleyma Adler ------ VISIT ID:  Patient ID:   [id number] YOB: 1935     INTRODUCTION: Colonoscopy - A 81 female patient presents for an outpatient   Colonoscopy at [address] [place] [address]  PREVIOUS COLONOSCOPY: Patient's last colonoscopy was more than 10 years   ago  INDICATIONS: Generalized abdominal pain  Abnormal CT scan  CONSENT:  The benefits, risks, and alternatives to the procedure were   discussed and informed consent was obtained from the patient  PREPARATION: EKG, pulse, pulse oximetry and blood pressure were monitored   throughout the procedure  The patient was identified by myself both   verbally and by visual inspection of ID band  Airway Assessment   Classification: Airway class 2 - Visualization of the soft palate, fauces   and uvula  ASA Classification: Class 2 - Patient has mild to moderate   systemic disturbance that may or may not be related to the disorder   requiring surgery  MEDICATIONS: Anesthesia-check records     PROCEDURE:  The endoscope was passed without difficulty through the anus   under direct visualization and advanced to the cecum, confirmed by   appendiceal orifice and ileocecal valve  The scope was withdrawn and the   mucosa was carefully examined  The quality of the preparation was   excellent  Cecal Intubation Time: 5 minutes(s) Scope Withdrawal Time: 5   minutes(s)     RECTAL EXAM: Normal rectal exam      FINDINGS:  There was evidence of moderately severe diverticulosis in the   entire colon  A single sessile polyp, measuring 8 mm in size, was found in   the cecum  The polyp was completely removed by snare cautery polypectomy  Retrieved  Normal retroversion     COMPLICATIONS: There were no complications  IMPRESSIONS: Moderately severe diverticulosis found in the entire colon  A   single sessile polyp found in the cecum; removed by snare cautery   polypectomy       RECOMMENDATIONS: Follow-up on the results of the biopsy specimens  Colonoscopy recommended in 5 years  ESTIMATED BLOOD LOSS: None  PATHOLOGY SPECIMENS: Completely removed by snare cautery polypectomy  PROCEDURE CODES: : Colonoscopy with snare polypectomy     ICD-9 Codes: 789 07 Abdominal pain, generalized 793 4 Nonspecific   (abnormal) findings on radiological and other examination of   gastrointestinal tract 562 10 Diverticulosis of colon (without mention of   hemorrhage) 211 3 Benign neoplasm of colon     ICD-10 Codes: R10 84 Generalized abdominal pain R93 3 Abnormal findings on   diagnostic imaging of other parts of digestive tract K57 Diverticular   disease of intestine K63 5 Polyp of colon     PERFORMED BY: JOHN Huff [facility]  on 03/01/2018  Version 1, electronically signed by JOHN Garcia , D O  on   03/01/2018 at 07:48

## 2018-03-01 NOTE — ANESTHESIA PREPROCEDURE EVALUATION
Review of Systems/Medical History  Patient summary reviewed        Cardiovascular  Exercise tolerance: good,  Hypertension ,    Pulmonary  Negative pulmonary ROS        GI/Hepatic    Bowel prep            Endo/Other  Diabetes well controlled type 2 Oral agent,      GYN  Negative gynecology ROS          Hematology  Negative hematology ROS      Musculoskeletal  Negative musculoskeletal ROS        Neurology  Negative neurology ROS      Psychology   Negative psychology ROS              Physical Exam    Airway    Mallampati score: II  TM Distance: >3 FB  Neck ROM: full     Dental   upper dentures,     Cardiovascular  Rhythm: regular, Rate: normal,     Pulmonary  Breath sounds clear to auscultation,     Other Findings        Anesthesia Plan  ASA Score- 3     Anesthesia Type- IV sedation with anesthesia with ASA Monitors  Additional Monitors:   Airway Plan:         Plan Factors-  Patient did not smoke on day of surgery  Induction- intravenous  Postoperative Plan-     Informed Consent- Anesthetic plan and risks discussed with patient  I personally reviewed this patient with the CRNA  Discussed and agreed on the Anesthesia Plan with the JAVID Cabrera

## 2018-03-13 ENCOUNTER — TELEPHONE (OUTPATIENT)
Dept: GASTROENTEROLOGY | Facility: CLINIC | Age: 83
End: 2018-03-13

## 2018-03-14 DIAGNOSIS — E11.8 TYPE 2 DIABETES MELLITUS WITH COMPLICATION, UNSPECIFIED LONG TERM INSULIN USE STATUS: Primary | ICD-10-CM

## 2018-03-14 DIAGNOSIS — E11.9 TYPE 2 DIABETES MELLITUS WITHOUT COMPLICATION, WITHOUT LONG-TERM CURRENT USE OF INSULIN (HCC): Primary | ICD-10-CM

## 2018-03-14 DIAGNOSIS — Z78.9 HEALTH MAINTENANCE ALTERATION: ICD-10-CM

## 2018-03-14 DIAGNOSIS — E78.5 HYPERLIPIDEMIA, UNSPECIFIED HYPERLIPIDEMIA TYPE: ICD-10-CM

## 2018-03-14 DIAGNOSIS — I10 HYPERTENSION, UNSPECIFIED TYPE: ICD-10-CM

## 2018-03-14 RX ORDER — LISINOPRIL 40 MG/1
40 TABLET ORAL DAILY
Qty: 90 TABLET | Refills: 0 | Status: SHIPPED | OUTPATIENT
Start: 2018-03-14 | End: 2018-05-18 | Stop reason: SDUPTHER

## 2018-03-14 RX ORDER — HYDROCHLOROTHIAZIDE 12.5 MG/1
12.5 TABLET ORAL DAILY
Qty: 90 TABLET | Refills: 0 | Status: SHIPPED | OUTPATIENT
Start: 2018-03-14 | End: 2018-06-05 | Stop reason: SDUPTHER

## 2018-03-14 RX ORDER — METFORMIN HYDROCHLORIDE 500 MG/1
TABLET, EXTENDED RELEASE ORAL
Qty: 180 TABLET | Refills: 0 | Status: SHIPPED | OUTPATIENT
Start: 2018-03-14 | End: 2018-05-18 | Stop reason: SDUPTHER

## 2018-03-14 RX ORDER — METOPROLOL SUCCINATE 100 MG/1
100 TABLET, EXTENDED RELEASE ORAL DAILY
Qty: 90 TABLET | Refills: 0 | Status: SHIPPED | OUTPATIENT
Start: 2018-03-14 | End: 2018-05-18 | Stop reason: SDUPTHER

## 2018-03-14 RX ORDER — METFORMIN HYDROCHLORIDE 500 MG/1
500 TABLET, EXTENDED RELEASE ORAL 2 TIMES DAILY
Qty: 180 TABLET | Refills: 0 | Status: SHIPPED | OUTPATIENT
Start: 2018-03-14 | End: 2018-06-05 | Stop reason: CLARIF

## 2018-03-14 RX ORDER — PRAVASTATIN SODIUM 20 MG
20 TABLET ORAL DAILY
Qty: 90 TABLET | Refills: 0 | Status: SHIPPED | OUTPATIENT
Start: 2018-03-14 | End: 2018-05-18 | Stop reason: SDUPTHER

## 2018-03-14 NOTE — TELEPHONE ENCOUNTER
Pt cld asking to have the following meds refilled and called into [facility] on [address]  Please call pt when completed   [phone]    HYDROCHLOROTHIAZIDE    12 5mg  METOPROLOL   100mg  LISINOPRIL    40mg  PRAVASTATIN   20mg  METFORMIN    500mg

## 2018-03-27 NOTE — OP NOTE
**** GI/ENDOSCOPY REPORT ****     PATIENT NAME: Joaquina Vivas ------ VISIT ID:  Patient ID:   [id number] YOB: 1935     INTRODUCTION: Colonoscopy - A 81 female patient presents for an outpatient   Colonoscopy at [facility]  PREVIOUS COLONOSCOPY: Patient's last colonoscopy was more than 10 years   ago  INDICATIONS: Generalized abdominal pain  Abnormal CT scan  CONSENT:  The benefits, risks, and alternatives to the procedure were   discussed and informed consent was obtained from the patient  PREPARATION: EKG, pulse, pulse oximetry and blood pressure were monitored   throughout the procedure  The patient was identified by myself both   verbally and by visual inspection of ID band  Airway Assessment   Classification: Airway class 2 - Visualization of the soft palate, fauces   and uvula  ASA Classification: Class 2 - Patient has mild to moderate   systemic disturbance that may or may not be related to the disorder   requiring surgery  MEDICATIONS: Anesthesia-check records     PROCEDURE:  The endoscope was passed without difficulty through the anus   under direct visualization and advanced to the cecum, confirmed by   appendiceal orifice and ileocecal valve  The scope was withdrawn and the   mucosa was carefully examined  The quality of the preparation was   excellent  Cecal Intubation Time: 5 minutes(s) Scope Withdrawal Time: 5   minutes(s)     RECTAL EXAM: Normal rectal exam      FINDINGS:  There was evidence of moderately severe diverticulosis in the   entire colon  A single sessile polyp, measuring 8 mm in size, was found in   the cecum  The polyp was completely removed by snare cautery polypectomy  Retrieved  Normal retroversion     COMPLICATIONS: There were no complications  IMPRESSIONS: Moderately severe diverticulosis found in the entire colon  A   single sessile polyp found in the cecum; removed by snare cautery   polypectomy       RECOMMENDATIONS: Follow-up on the results of the biopsy specimens  Colonoscopy recommended in 5 years  ESTIMATED BLOOD LOSS: None  PATHOLOGY SPECIMENS: Completely removed by snare cautery polypectomy  PROCEDURE CODES: : Colonoscopy with snare polypectomy     ICD-9 Codes: 789 07 Abdominal pain, generalized 793 4 Nonspecific   (abnormal) findings on radiological and other examination of   gastrointestinal tract 562 10 Diverticulosis of colon (without mention of   hemorrhage) 211 3 Benign neoplasm of colon     ICD-10 Codes: R10 84 Generalized abdominal pain R93 3 Abnormal findings on   diagnostic imaging of other parts of digestive tract K57 Diverticular   disease of intestine K63 5 Polyp of colon     PERFORMED BY: JOHN Contreras  on 03/01/2018  Version 1, electronically signed by JOHN Denny , D O  on   03/01/2018 at 07:48

## 2018-05-17 DIAGNOSIS — Z78.9 HEALTH MAINTENANCE ALTERATION: ICD-10-CM

## 2018-05-17 DIAGNOSIS — I10 HYPERTENSION, UNSPECIFIED TYPE: ICD-10-CM

## 2018-05-17 DIAGNOSIS — E78.5 HYPERLIPIDEMIA, UNSPECIFIED HYPERLIPIDEMIA TYPE: ICD-10-CM

## 2018-05-17 DIAGNOSIS — E11.9 TYPE 2 DIABETES MELLITUS WITHOUT COMPLICATION, WITHOUT LONG-TERM CURRENT USE OF INSULIN (HCC): ICD-10-CM

## 2018-05-17 NOTE — TELEPHONE ENCOUNTER
Metoprolol succinate 100 mg 24 hr tablet-take 1 tablet by mouth daily  Metformin 500 mg -take one tablet (500 mg total) by mouth 2 times a day for 90 days  Lisinopril 40 mg tablet-take one tablet by mouth daily  Pravastatin 20 mg tablet-take one tablet moutn mouth daily      Patient is asking for a 90 day supply with refills please? Send to [facility]

## 2018-05-18 RX ORDER — METFORMIN HYDROCHLORIDE 500 MG/1
500 TABLET, EXTENDED RELEASE ORAL 2 TIMES DAILY
Qty: 180 TABLET | Refills: 3 | Status: SHIPPED | OUTPATIENT
Start: 2018-05-18 | End: 2018-06-05 | Stop reason: SDUPTHER

## 2018-05-18 RX ORDER — METOPROLOL SUCCINATE 100 MG/1
100 TABLET, EXTENDED RELEASE ORAL DAILY
Qty: 90 TABLET | Refills: 3 | Status: SHIPPED | OUTPATIENT
Start: 2018-05-18 | End: 2018-06-05 | Stop reason: SDUPTHER

## 2018-05-18 RX ORDER — PRAVASTATIN SODIUM 20 MG
20 TABLET ORAL DAILY
Qty: 90 TABLET | Refills: 3 | Status: SHIPPED | OUTPATIENT
Start: 2018-05-18 | End: 2018-06-05 | Stop reason: SDUPTHER

## 2018-05-18 RX ORDER — LISINOPRIL 40 MG/1
40 TABLET ORAL DAILY
Qty: 90 TABLET | Refills: 3 | Status: SHIPPED | OUTPATIENT
Start: 2018-05-18 | End: 2018-06-05 | Stop reason: SDUPTHER

## 2018-06-05 ENCOUNTER — OFFICE VISIT (OUTPATIENT)
Dept: INTERNAL MEDICINE CLINIC | Facility: CLINIC | Age: 83
End: 2018-06-05
Payer: MEDICARE

## 2018-06-05 ENCOUNTER — LAB (OUTPATIENT)
Dept: LAB | Facility: CLINIC | Age: 83
End: 2018-06-05
Payer: MEDICARE

## 2018-06-05 VITALS
WEIGHT: 160.2 LBS | HEART RATE: 78 BPM | DIASTOLIC BLOOD PRESSURE: 86 MMHG | SYSTOLIC BLOOD PRESSURE: 166 MMHG | BODY MASS INDEX: 29.48 KG/M2 | HEIGHT: 62 IN | OXYGEN SATURATION: 95 %

## 2018-06-05 DIAGNOSIS — E11.9 TYPE 2 DIABETES MELLITUS WITHOUT COMPLICATION, WITHOUT LONG-TERM CURRENT USE OF INSULIN (HCC): ICD-10-CM

## 2018-06-05 DIAGNOSIS — E13.8 DIABETES MELLITUS OF OTHER TYPE WITH COMPLICATION, UNSPECIFIED LONG TERM INSULIN USE STATUS: ICD-10-CM

## 2018-06-05 DIAGNOSIS — E78.2 MIXED HYPERLIPIDEMIA: ICD-10-CM

## 2018-06-05 DIAGNOSIS — I10 ESSENTIAL HYPERTENSION: ICD-10-CM

## 2018-06-05 PROBLEM — R10.9 ABDOMINAL PAIN WITH VOMITING: Status: RESOLVED | Noted: 2018-02-23 | Resolved: 2018-06-05

## 2018-06-05 PROBLEM — R11.10 ABDOMINAL PAIN WITH VOMITING: Status: RESOLVED | Noted: 2018-02-23 | Resolved: 2018-06-05

## 2018-06-05 LAB
ALBUMIN SERPL BCP-MCNC: 3.5 G/DL (ref 3.5–5)
ALP SERPL-CCNC: 57 U/L (ref 46–116)
ALT SERPL W P-5'-P-CCNC: 17 U/L (ref 12–78)
ANION GAP SERPL CALCULATED.3IONS-SCNC: 6 MMOL/L (ref 4–13)
AST SERPL W P-5'-P-CCNC: 10 U/L (ref 5–45)
BASOPHILS # BLD AUTO: 0.02 THOUSANDS/ΜL (ref 0–0.1)
BASOPHILS NFR BLD AUTO: 0 % (ref 0–1)
BILIRUB SERPL-MCNC: 0.39 MG/DL (ref 0.2–1)
BUN SERPL-MCNC: 14 MG/DL (ref 5–25)
CALCIUM SERPL-MCNC: 9.5 MG/DL (ref 8.3–10.1)
CHLORIDE SERPL-SCNC: 104 MMOL/L (ref 100–108)
CHOLEST SERPL-MCNC: 110 MG/DL (ref 50–200)
CO2 SERPL-SCNC: 30 MMOL/L (ref 21–32)
CREAT SERPL-MCNC: 0.69 MG/DL (ref 0.6–1.3)
CREAT UR-MCNC: 65.5 MG/DL
EOSINOPHIL # BLD AUTO: 0.08 THOUSAND/ΜL (ref 0–0.61)
EOSINOPHIL NFR BLD AUTO: 1 % (ref 0–6)
ERYTHROCYTE [DISTWIDTH] IN BLOOD BY AUTOMATED COUNT: 15 % (ref 11.6–15.1)
EST. AVERAGE GLUCOSE BLD GHB EST-MCNC: 131 MG/DL
GFR SERPL CREATININE-BSD FRML MDRD: 94 ML/MIN/1.73SQ M
GLUCOSE P FAST SERPL-MCNC: 83 MG/DL (ref 65–99)
HBA1C MFR BLD: 6.2 % (ref 4.2–6.3)
HCT VFR BLD AUTO: 41.1 % (ref 34.8–46.1)
HDLC SERPL-MCNC: 45 MG/DL (ref 40–60)
HGB BLD-MCNC: 13.2 G/DL (ref 11.5–15.4)
IMM GRANULOCYTES # BLD AUTO: 0.03 THOUSAND/UL (ref 0–0.2)
IMM GRANULOCYTES NFR BLD AUTO: 0 % (ref 0–2)
LDLC SERPL CALC-MCNC: 38 MG/DL (ref 0–100)
LYMPHOCYTES # BLD AUTO: 2.16 THOUSANDS/ΜL (ref 0.6–4.47)
LYMPHOCYTES NFR BLD AUTO: 23 % (ref 14–44)
MCH RBC QN AUTO: 26.4 PG (ref 26.8–34.3)
MCHC RBC AUTO-ENTMCNC: 32.1 G/DL (ref 31.4–37.4)
MCV RBC AUTO: 82 FL (ref 82–98)
MICROALBUMIN UR-MCNC: 188 MG/L (ref 0–20)
MICROALBUMIN/CREAT 24H UR: 287 MG/G CREATININE (ref 0–30)
MONOCYTES # BLD AUTO: 0.77 THOUSAND/ΜL (ref 0.17–1.22)
MONOCYTES NFR BLD AUTO: 8 % (ref 4–12)
NEUTROPHILS # BLD AUTO: 6.23 THOUSANDS/ΜL (ref 1.85–7.62)
NEUTS SEG NFR BLD AUTO: 68 % (ref 43–75)
NONHDLC SERPL-MCNC: 65 MG/DL
NRBC BLD AUTO-RTO: 0 /100 WBCS
PLATELET # BLD AUTO: 290 THOUSANDS/UL (ref 149–390)
PMV BLD AUTO: 9.1 FL (ref 8.9–12.7)
POTASSIUM SERPL-SCNC: 4.1 MMOL/L (ref 3.5–5.3)
PROT SERPL-MCNC: 7.8 G/DL (ref 6.4–8.2)
RBC # BLD AUTO: 5 MILLION/UL (ref 3.81–5.12)
SODIUM SERPL-SCNC: 140 MMOL/L (ref 136–145)
TRIGL SERPL-MCNC: 137 MG/DL
TSH SERPL DL<=0.05 MIU/L-ACNC: 0.05 UIU/ML (ref 0.36–3.74)
WBC # BLD AUTO: 9.29 THOUSAND/UL (ref 4.31–10.16)

## 2018-06-05 PROCEDURE — 99214 OFFICE O/P EST MOD 30 MIN: CPT | Performed by: INTERNAL MEDICINE

## 2018-06-05 PROCEDURE — 84443 ASSAY THYROID STIM HORMONE: CPT

## 2018-06-05 PROCEDURE — 82043 UR ALBUMIN QUANTITATIVE: CPT

## 2018-06-05 PROCEDURE — 80061 LIPID PANEL: CPT

## 2018-06-05 PROCEDURE — 85025 COMPLETE CBC W/AUTO DIFF WBC: CPT

## 2018-06-05 PROCEDURE — 82570 ASSAY OF URINE CREATININE: CPT

## 2018-06-05 PROCEDURE — 83036 HEMOGLOBIN GLYCOSYLATED A1C: CPT

## 2018-06-05 PROCEDURE — 36415 COLL VENOUS BLD VENIPUNCTURE: CPT

## 2018-06-05 PROCEDURE — 80053 COMPREHEN METABOLIC PANEL: CPT

## 2018-06-05 RX ORDER — METOPROLOL SUCCINATE 100 MG/1
100 TABLET, EXTENDED RELEASE ORAL DAILY
Qty: 90 TABLET | Refills: 3 | Status: SHIPPED | OUTPATIENT
Start: 2018-06-05 | End: 2018-06-05 | Stop reason: SDUPTHER

## 2018-06-05 RX ORDER — METOPROLOL SUCCINATE 100 MG/1
100 TABLET, EXTENDED RELEASE ORAL DAILY
Qty: 90 TABLET | Refills: 0 | Status: SHIPPED | OUTPATIENT
Start: 2018-06-05 | End: 2018-09-12 | Stop reason: SDUPTHER

## 2018-06-05 RX ORDER — PRAVASTATIN SODIUM 20 MG
20 TABLET ORAL DAILY
Qty: 90 TABLET | Refills: 3 | Status: SHIPPED | OUTPATIENT
Start: 2018-06-05 | End: 2018-09-12 | Stop reason: SDUPTHER

## 2018-06-05 RX ORDER — LISINOPRIL 40 MG/1
40 TABLET ORAL DAILY
Qty: 90 TABLET | Refills: 3 | Status: SHIPPED | OUTPATIENT
Start: 2018-06-05 | End: 2018-09-12 | Stop reason: SDUPTHER

## 2018-06-05 RX ORDER — METFORMIN HYDROCHLORIDE 500 MG/1
500 TABLET, EXTENDED RELEASE ORAL 2 TIMES DAILY
Qty: 180 TABLET | Refills: 3 | Status: SHIPPED | OUTPATIENT
Start: 2018-06-05 | End: 2018-09-12 | Stop reason: SDUPTHER

## 2018-06-05 RX ORDER — AMLODIPINE BESYLATE 5 MG/1
5 TABLET ORAL DAILY
Qty: 90 TABLET | Refills: 3 | Status: SHIPPED | OUTPATIENT
Start: 2018-06-05 | End: 2018-09-12 | Stop reason: SDUPTHER

## 2018-06-05 RX ORDER — METOPROLOL SUCCINATE 100 MG/1
100 TABLET, EXTENDED RELEASE ORAL DAILY
Qty: 90 TABLET | Refills: 0 | Status: CANCELLED | OUTPATIENT
Start: 2018-06-05 | End: 2018-09-03

## 2018-06-05 RX ORDER — HYDROCHLOROTHIAZIDE 12.5 MG/1
12.5 TABLET ORAL DAILY
Qty: 90 TABLET | Refills: 3 | Status: SHIPPED | OUTPATIENT
Start: 2018-06-05 | End: 2018-09-12 | Stop reason: SDUPTHER

## 2018-06-05 NOTE — PROGRESS NOTES
INTERNAL MEDICINE OFFICE VISIT  [facility] [address] [place] [address]  Tel: [phone]      NAME: Amol Patino  AGE: 81 y o  SEX: female  : 1935   MRN: [id number]    DATE: 2018  TIME: 11:15 AM      Assessment and Plan:  1  Type 2 diabetes mellitus without complication, without long-term current use of insulin ([address] )  She did not get blood work done but the last hemoglobin A1c done in 2017 was 6 4  She says that her morning fasting glucose readings are well controlled  She was told to continue the same dose of metformin for now and I will check a hemoglobin A1c level     - metFORMIN (GLUCOPHAGE-XR) 500 mg 24 hr tablet; Take 1 tablet (500 mg total) by mouth 2 (two) times a day  Dispense: 180 tablet; Refill: 3  - CBC and differential; Future  - Comprehensive metabolic panel; Future  - HEMOGLOBIN A1C W/ EAG ESTIMATION; Future  - Microalbumin / creatinine urine ratio; Future    2  Essential hypertension  Continue lisinopril, metoprolol and hydrochlorothiazide  I will add 5 mg of amlodipine as her blood pressure was high today  - lisinopril (ZESTRIL) 40 mg tablet; Take 1 tablet (40 mg total) by mouth daily  Dispense: 90 tablet; Refill: 3  - hydrochlorothiazide (HYDRODIURIL) 12 5 mg tablet; Take 1 tablet (12 5 mg total) by mouth daily  Dispense: 90 tablet; Refill: 3  - amLODIPine (NORVASC) 5 mg tablet; Take 1 tablet (5 mg total) by mouth daily  Dispense: 90 tablet; Refill: 3    3  Mixed hyperlipidemia  Continue pravastatin  - pravastatin (PRAVACHOL) 20 mg tablet; Take 1 tablet (20 mg total) by mouth daily for 90 days  Dispense: 90 tablet; Refill: 3  - Lipid panel; Future  - TSH, 3rd generation;  Future          - Counseling Documentation: patient was counseled regarding: instructions for management, risk factor reductions, prognosis, patient and family education, impressions, risks and benefits of treatment options and importance of compliance with treatment  - Medication Side Effects: Adverse side effects of medications were reviewed with the patient/guardian today  Return for follow up visit in 4 months or earlier, if needed  Chief Complaint:  Chief Complaint   Patient presents with    Well Check     4 month         History of Present Illness:   Type 2 diabetes-she has been taking metformin 500 mg twice a day and is pretty controlled on the above medication  Hypertension-blood pressure has been running high despite taking 3 medications including lisinopril, metoprolol and hydrochlorothiazide  She is asymptomatic  Hyperlipidemia-she takes pravastatin regularly  Active Problem List:  Patient Active Problem List   Diagnosis    Type 2 diabetes mellitus without complication, without long-term current use of insulin (HCC)    Benign essential hypertension    Mixed hyperlipidemia    Abnormal TSH    Leukocytosis    Colitis    Abnormal CT of the abdomen         Past Medical History:  Past Medical History:   Diagnosis Date    Diabetes mellitus ([address] )     GERD (gastroesophageal reflux disease)     Hyperlipidemia     Hypertension          Past Surgical History:  Past Surgical History:   Procedure Laterality Date     SECTION      CHOLECYSTECTOMY      NECK SURGERY      AL COLONOSCOPY FLX DX W/COLLJ SPEC WHEN PFRMD N/A 3/1/2018    Procedure: COLONOSCOPY;  Surgeon: Farhan Dewitt MD;  Location: [facility]; Service: Gastroenterology         Family History:  History reviewed  No pertinent family history  Social History:  Social History     Social History    Marital status:       Spouse name: N/A    Number of children: N/A    Years of education: N/A     Social History Main Topics    Smoking status: Never Smoker    Smokeless tobacco: Never Used    Alcohol use No    Drug use: No    Sexual activity: Not Asked     Other Topics Concern    None     Social History Narrative    No advance directive on file Allergies:  No Known Allergies      Medications:    Current Outpatient Prescriptions:     Calcium Carbonate-Vitamin D3 (CALCIUM 600/VITAMIN D) 600-400 MG-UNIT TABS, Take by mouth, Disp: , Rfl:     hydrochlorothiazide (HYDRODIURIL) 12 5 mg tablet, Take 1 tablet (12 5 mg total) by mouth daily, Disp: 90 tablet, Rfl: 3    lisinopril (ZESTRIL) 40 mg tablet, Take 1 tablet (40 mg total) by mouth daily, Disp: 90 tablet, Rfl: 3    metFORMIN (GLUCOPHAGE-XR) 500 mg 24 hr tablet, Take 1 tablet (500 mg total) by mouth 2 (two) times a day, Disp: 180 tablet, Rfl: 3    metoprolol succinate (TOPROL-XL) 100 mg 24 hr tablet, Take 1 tablet (100 mg total) by mouth daily for 90 days, Disp: 90 tablet, Rfl: 3    pravastatin (PRAVACHOL) 20 mg tablet, Take 1 tablet (20 mg total) by mouth daily for 90 days, Disp: 90 tablet, Rfl: 3    amLODIPine (NORVASC) 5 mg tablet, Take 1 tablet (5 mg total) by mouth daily, Disp: 90 tablet, Rfl: 3      The following portions of the patient's history were reviewed and updated as appropriate: past medical history, past surgical history, family history, social history, allergies, current medications and active problem list       Review of Systems:  Constitutional: Denies fever, chills, weight gain, weight loss, fatigue  Eyes: Denies eye redness, eye discharge, double vision, change in visual acuity  ENT: Denies hearing loss, tinnitus, sneezing, nasal congestion, nasal discharge, sore throat   Respiratory: Denies cough, expectoration, hemoptysis, shortness of breath, wheezing  Cardiovascular: Denies chest pain, palpitations, lower extremity swelling, orthopnea, PND  Gastrointestinal: Denies abdominal pain, heartburn, nausea, vomiting, hematemesis, diarrhea, bloody stools  Genito-Urinary: Denies dysuria, frequency, difficulty in micturition, nocturia, incontinence  Musculoskeletal: Denies back pain, joint pain, muscle pain  Neurologic: Denies confusion, lightheadedness, syncope, headache, focal weakness, sensory changes, seizures  Endocrine: Denies polyuria, polydipsia, temperature intolerance  Allergy and Immunology: Denies hives, insect bite sensitivity  Hematological and Lymphatic: Denies bleeding problems, swollen glands   Psychological: Denies depression, suicidal ideation, anxiety, panic, mood swings  Dermatological: Denies pruritus, rash, skin lesion changes      Vitals:  Vitals:    06/05/18 1037   BP: 166/86   Pulse: 78   SpO2: 95%       Body mass index is 29 78 kg/m²  Weight (last 2 days)     Date/Time   Weight    06/05/18 1037  72 7 (160 2)                Physical Examination:  General: Patient is not in acute distress  Awake, alert, responding to commands  No weight gain or loss  Head: Normocephalic  Atraumatic  Eyes: Conjunctiva and lids with no swelling, erythema or discharge  Both pupils normal sized, round and reactive to light  Sclera nonicteric  ENT: External examination of nose and ear normal  Otoscopic examination shows translucent tympanic membranes with patent canals without erythema  Oropharynx moist with no erythema, edema, exudate or lesions  Neck: Supple  JVP not raised  Trachea midline  No masses  No thyromegaly  Lungs: No signs of increased work of breathing or respiratory distress  Bilateral bronchovascular breath sounds with no crackles or rhonchi  Chest wall: No tenderness  Cardiovascular: Normal PMI  No thrills  Regular rate and rhythm  S1 and S2 normal  No murmur, rub or gallop  Gastrointestinal: Abdomen soft, nontender  No guarding or rigidity  Liver and spleen not palpable  Bowel sounds present  Neurologic: Cranial nerves II-XII intact   Cortical functions normal  Motor system - Reflexes 2+ and symmetrical  Sensations normal  Musculoskeletal: Gait normal  No joint tenderness  Integumentary: Skin normal with no rash or lesions  Lymphatic: No palpable lymph nodes in neck, axilla or groin  Extremities: No clubbing, cyanosis, edema or varicosities  Psychological: Judgement and insight normal  Mood and affect normal      Laboratory Results:  CBC with diff:   Lab Results   Component Value Date    WBC 10 35 (H) 11/11/2017    RBC 5 39 (H) 11/11/2017    HGB 13 3 11/11/2017    HCT 41 2 11/11/2017    MCV 76 (L) 11/11/2017    MCH 24 7 (L) 11/11/2017    RDW 15 8 (H) 11/11/2017     11/11/2017       CMP:  Lab Results   Component Value Date    CREATININE 0 85 10/14/2017    BUN 14 10/14/2017     10/14/2017    K 4 0 10/14/2017     10/14/2017    CO2 32 10/14/2017    PROT 8 3 (H) 10/14/2017    ALKPHOS 63 10/14/2017    ALT 17 10/14/2017    AST 9 10/14/2017       Lab Results   Component Value Date    HGBA1C 6 4 (H) 10/14/2017       No results found for: TROPONINI, CKMB, CKTOTAL    Lipid Profile:   Lab Results   Component Value Date    CHOL 125 10/14/2017     Lab Results   Component Value Date    HDL 46 10/14/2017     Lab Results   Component Value Date    LDLCALC 52 10/14/2017     Lab Results   Component Value Date    TRIG 134 10/14/2017         Health Maintenance:  Health Maintenance   Topic Date Due    DTaP,Tdap,and Td Vaccines (1 - Tdap) 05[birth year]/1957    PNEUMOCOCCAL POLYSACCHARIDE VACCINE AGE 65 AND OVER  05/07/2001    GLAUCOMA SCREENING 67+ YR  05/07/2003    HEMOGLOBIN A1C  04/14/2018    INFLUENZA VACCINE  09/01/2018    URINE MICROALBUMIN  10/14/2018    OPHTHALMOLOGY EXAM  10/30/2018    Fall Risk  01/30/2019    Depression Screening PHQ-9  01/30/2019    Urinary Incontinence Screening  01/30/2019    Diabetic Foot Exam  01/30/2019     Immunization History   Administered Date(s) Administered    Influenza Split High Dose Preservative Free IM 10/02/2017         Tamiko Devi MD  6/5/2018,11:15 AM

## 2018-06-07 ENCOUNTER — TELEPHONE (OUTPATIENT)
Dept: INTERNAL MEDICINE CLINIC | Facility: CLINIC | Age: 83
End: 2018-06-07

## 2018-06-07 NOTE — TELEPHONE ENCOUNTER
----- Message from Tamiko Devi MD sent at 6/6/2018  6:44 PM EDT -----  Labs ok, please call and inform patient

## 2018-06-27 ENCOUNTER — TELEPHONE (OUTPATIENT)
Dept: GASTROENTEROLOGY | Facility: CLINIC | Age: 83
End: 2018-06-27

## 2018-06-27 NOTE — TELEPHONE ENCOUNTER
Spoke with patient  H/O diverticulosis    Patient c o Mid abdominal pain above umbilicus 0/36 intermittent  States "same as before"  Patient also c/o nausea  Last BM was 1 day ago  She states the last two days she has been straining  Last COLON 3/1/2018 showed diverticulitis, polyp biopsy was negative  Advised patient to increase fiber in her diet, and try miralax daily until BM  She has been using maalox with mild relief  She will call back if symptoms persist     Any other suggestions?

## 2018-06-27 NOTE — TELEPHONE ENCOUNTER
Pt called with abdominal pain and nausea, it has been going on for 2 days   Please advise, ty       Pt can reached at [phone]

## 2018-06-30 ENCOUNTER — HOSPITAL ENCOUNTER (INPATIENT)
Facility: HOSPITAL | Age: 83
LOS: 8 days | Discharge: HOME | DRG: 853 | End: 2018-07-08
Attending: EMERGENCY MEDICINE | Admitting: SURGERY
Payer: MEDICARE

## 2018-06-30 ENCOUNTER — APPOINTMENT (EMERGENCY)
Dept: CT IMAGING | Facility: HOSPITAL | Age: 83
DRG: 853 | End: 2018-06-30
Payer: MEDICARE

## 2018-06-30 DIAGNOSIS — K35.32 PERFORATED APPENDICITIS: Primary | ICD-10-CM

## 2018-06-30 DIAGNOSIS — I10 ESSENTIAL HYPERTENSION: ICD-10-CM

## 2018-06-30 DIAGNOSIS — E11.9 TYPE 2 DIABETES MELLITUS WITHOUT COMPLICATION, WITHOUT LONG-TERM CURRENT USE OF INSULIN (HCC): ICD-10-CM

## 2018-06-30 LAB
ALBUMIN SERPL BCP-MCNC: 3.1 G/DL (ref 3.5–5)
ALP SERPL-CCNC: 69 U/L (ref 46–116)
ALT SERPL W P-5'-P-CCNC: 28 U/L (ref 12–78)
ANION GAP SERPL CALCULATED.3IONS-SCNC: 3 MMOL/L (ref 4–13)
AST SERPL W P-5'-P-CCNC: 31 U/L (ref 5–45)
BACTERIA UR QL AUTO: ABNORMAL /HPF
BASOPHILS # BLD AUTO: 0.03 THOUSANDS/ΜL (ref 0–0.1)
BASOPHILS NFR BLD AUTO: 0 % (ref 0–1)
BILIRUB SERPL-MCNC: 0.4 MG/DL (ref 0.2–1)
BILIRUB UR QL STRIP: NEGATIVE
BUN SERPL-MCNC: 18 MG/DL (ref 5–25)
CALCIUM SERPL-MCNC: 9.4 MG/DL (ref 8.3–10.1)
CHLORIDE SERPL-SCNC: 100 MMOL/L (ref 100–108)
CLARITY UR: CLEAR
CO2 SERPL-SCNC: 34 MMOL/L (ref 21–32)
COLOR UR: YELLOW
CREAT SERPL-MCNC: 0.77 MG/DL (ref 0.6–1.3)
EOSINOPHIL # BLD AUTO: 0.06 THOUSAND/ΜL (ref 0–0.61)
EOSINOPHIL NFR BLD AUTO: 0 % (ref 0–6)
ERYTHROCYTE [DISTWIDTH] IN BLOOD BY AUTOMATED COUNT: 14.4 % (ref 11.6–15.1)
GFR SERPL CREATININE-BSD FRML MDRD: 83 ML/MIN/1.73SQ M
GLUCOSE SERPL-MCNC: 110 MG/DL (ref 65–140)
GLUCOSE SERPL-MCNC: 112 MG/DL (ref 65–140)
GLUCOSE UR STRIP-MCNC: NEGATIVE MG/DL
HCT VFR BLD AUTO: 37.6 % (ref 34.8–46.1)
HGB BLD-MCNC: 12.3 G/DL (ref 11.5–15.4)
HGB UR QL STRIP.AUTO: NEGATIVE
IMM GRANULOCYTES # BLD AUTO: 0.11 THOUSAND/UL (ref 0–0.2)
IMM GRANULOCYTES NFR BLD AUTO: 1 % (ref 0–2)
KETONES UR STRIP-MCNC: NEGATIVE MG/DL
LEUKOCYTE ESTERASE UR QL STRIP: ABNORMAL
LIPASE SERPL-CCNC: 91 U/L (ref 73–393)
LYMPHOCYTES # BLD AUTO: 1.91 THOUSANDS/ΜL (ref 0.6–4.47)
LYMPHOCYTES NFR BLD AUTO: 11 % (ref 14–44)
MCH RBC QN AUTO: 25.7 PG (ref 26.8–34.3)
MCHC RBC AUTO-ENTMCNC: 32.7 G/DL (ref 31.4–37.4)
MCV RBC AUTO: 79 FL (ref 82–98)
MONOCYTES # BLD AUTO: 1.53 THOUSAND/ΜL (ref 0.17–1.22)
MONOCYTES NFR BLD AUTO: 9 % (ref 4–12)
NEUTROPHILS # BLD AUTO: 13.79 THOUSANDS/ΜL (ref 1.85–7.62)
NEUTS SEG NFR BLD AUTO: 79 % (ref 43–75)
NITRITE UR QL STRIP: NEGATIVE
NON-SQ EPI CELLS URNS QL MICRO: ABNORMAL /HPF
NRBC BLD AUTO-RTO: 0 /100 WBCS
PH UR STRIP.AUTO: 8 [PH] (ref 4.5–8)
PLATELET # BLD AUTO: 304 THOUSANDS/UL (ref 149–390)
PMV BLD AUTO: 8.6 FL (ref 8.9–12.7)
POTASSIUM SERPL-SCNC: 4.4 MMOL/L (ref 3.5–5.3)
PROT SERPL-MCNC: 7.9 G/DL (ref 6.4–8.2)
PROT UR STRIP-MCNC: NEGATIVE MG/DL
RBC # BLD AUTO: 4.79 MILLION/UL (ref 3.81–5.12)
RBC #/AREA URNS AUTO: ABNORMAL /HPF
SODIUM SERPL-SCNC: 137 MMOL/L (ref 136–145)
SP GR UR STRIP.AUTO: 1.01 (ref 1–1.03)
UROBILINOGEN UR QL STRIP.AUTO: 0.2 E.U./DL
WBC # BLD AUTO: 17.43 THOUSAND/UL (ref 4.31–10.16)
WBC #/AREA URNS AUTO: ABNORMAL /HPF

## 2018-06-30 PROCEDURE — 83690 ASSAY OF LIPASE: CPT | Performed by: EMERGENCY MEDICINE

## 2018-06-30 PROCEDURE — 36415 COLL VENOUS BLD VENIPUNCTURE: CPT | Performed by: EMERGENCY MEDICINE

## 2018-06-30 PROCEDURE — 81001 URINALYSIS AUTO W/SCOPE: CPT | Performed by: EMERGENCY MEDICINE

## 2018-06-30 PROCEDURE — 74177 CT ABD & PELVIS W/CONTRAST: CPT

## 2018-06-30 PROCEDURE — 96374 THER/PROPH/DIAG INJ IV PUSH: CPT

## 2018-06-30 PROCEDURE — 80053 COMPREHEN METABOLIC PANEL: CPT | Performed by: EMERGENCY MEDICINE

## 2018-06-30 PROCEDURE — 82948 REAGENT STRIP/BLOOD GLUCOSE: CPT

## 2018-06-30 PROCEDURE — 85025 COMPLETE CBC W/AUTO DIFF WBC: CPT | Performed by: EMERGENCY MEDICINE

## 2018-06-30 PROCEDURE — 96361 HYDRATE IV INFUSION ADD-ON: CPT

## 2018-06-30 PROCEDURE — 99285 EMERGENCY DEPT VISIT HI MDM: CPT

## 2018-06-30 PROCEDURE — 87086 URINE CULTURE/COLONY COUNT: CPT | Performed by: EMERGENCY MEDICINE

## 2018-06-30 PROCEDURE — 96375 TX/PRO/DX INJ NEW DRUG ADDON: CPT

## 2018-06-30 RX ORDER — MORPHINE SULFATE 2 MG/ML
2 INJECTION, SOLUTION INTRAMUSCULAR; INTRAVENOUS ONCE
Status: COMPLETED | OUTPATIENT
Start: 2018-06-30 | End: 2018-06-30

## 2018-06-30 RX ORDER — SODIUM CHLORIDE 9 MG/ML
125 INJECTION, SOLUTION INTRAVENOUS CONTINUOUS
Status: DISCONTINUED | OUTPATIENT
Start: 2018-06-30 | End: 2018-07-01

## 2018-06-30 RX ORDER — ONDANSETRON 4 MG/1
4 TABLET, ORALLY DISINTEGRATING ORAL ONCE
Status: COMPLETED | OUTPATIENT
Start: 2018-06-30 | End: 2018-06-30

## 2018-06-30 RX ORDER — FENTANYL CITRATE 50 UG/ML
50 INJECTION, SOLUTION INTRAMUSCULAR; INTRAVENOUS ONCE
Status: COMPLETED | OUTPATIENT
Start: 2018-06-30 | End: 2018-06-30

## 2018-06-30 RX ORDER — ONDANSETRON 2 MG/ML
4 INJECTION INTRAMUSCULAR; INTRAVENOUS EVERY 6 HOURS PRN
Status: DISCONTINUED | OUTPATIENT
Start: 2018-06-30 | End: 2018-07-08 | Stop reason: HOSPADM

## 2018-06-30 RX ORDER — MORPHINE SULFATE 4 MG/ML
4 INJECTION, SOLUTION INTRAMUSCULAR; INTRAVENOUS EVERY 4 HOURS PRN
Status: DISCONTINUED | OUTPATIENT
Start: 2018-06-30 | End: 2018-07-02

## 2018-06-30 RX ADMIN — FENTANYL CITRATE 50 MCG: 50 INJECTION INTRAMUSCULAR; INTRAVENOUS at 19:16

## 2018-06-30 RX ADMIN — METRONIDAZOLE 500 MG: 500 INJECTION, SOLUTION INTRAVENOUS at 20:51

## 2018-06-30 RX ADMIN — ONDANSETRON 4 MG: 4 TABLET, ORALLY DISINTEGRATING ORAL at 19:16

## 2018-06-30 RX ADMIN — SODIUM CHLORIDE 1000 ML: 0.9 INJECTION, SOLUTION INTRAVENOUS at 19:16

## 2018-06-30 RX ADMIN — PIPERACILLIN SODIUM,TAZOBACTAM SODIUM 3.38 G: 3; .375 INJECTION, POWDER, FOR SOLUTION INTRAVENOUS at 21:32

## 2018-06-30 RX ADMIN — IOHEXOL 100 ML: 350 INJECTION, SOLUTION INTRAVENOUS at 20:09

## 2018-06-30 RX ADMIN — MORPHINE SULFATE 2 MG: 2 INJECTION, SOLUTION INTRAMUSCULAR; INTRAVENOUS at 22:20

## 2018-06-30 RX ADMIN — SODIUM CHLORIDE 125 ML/HR: 0.9 INJECTION, SOLUTION INTRAVENOUS at 23:18

## 2018-06-30 NOTE — ED PROVIDER NOTES
Pt Name: Ermalinda Mcardle  MRN: [id number]  [place] 1935  Age/Sex: 81 y o  female  Date of evaluation: 2018  PCP: Madhuri Arizmendi MD    [address] [place] [address]    Chief Complaint   Patient presents with    Back Pain     pt reports pain in abdomen x 3days  ago and pt reports vomitting  as per pt, abdominal pain has turned into back pain  HPI    Celeste Garcia presents to the Emergency Department complaining of abdominal and back pain  She has been told that she has diverticulosis on colonoscopy and she believes that she could have diverticulitis  She has felt somewhat constipated in that there is bloating and pressure and she has not been able to have normal bowel movements but she does not have her urge to go  She has been taking miralax  She overall feels weak  HPI      Past Medical and Surgical History    Past Medical History:   Diagnosis Date    Diabetes mellitus ([address] )     GERD (gastroesophageal reflux disease)     Hyperlipidemia     Hypertension        Past Surgical History:   Procedure Laterality Date     SECTION      CHOLECYSTECTOMY      NECK SURGERY      [place] COLONOSCOPY FLX DX W/COLLJ SPEC WHEN PFRMD N/A 3/1/2018    Procedure: COLONOSCOPY;  Surgeon: Levar Dangelo MD;  Location: [facility]; Service: Gastroenterology       History reviewed  No pertinent family history  Social History   Substance Use Topics    Smoking status: Never Smoker    Smokeless tobacco: Never Used    Alcohol use No              Allergies    No Known Allergies    Home Medications    Prior to Admission medications    Medication Sig Start Date End Date Taking?  Authorizing Provider   amLODIPine (NORVASC) 5 mg tablet Take 1 tablet (5 mg total) by mouth daily 18   Madhuri Arizmendi MD   Calcium Carbonate-Vitamin D3 (CALCIUM 600/VITAMIN D) 600-400 MG-UNIT TABS Take by mouth 10/2/17   Historical Provider, MD   hydrochlorothiazide (HYDRODIURIL) 12 5 mg tablet Take 1 tablet (12 5 mg total) by mouth daily 18 Tawana Crow MD   lisinopril (ZESTRIL) 40 mg tablet Take 1 tablet (40 mg total) by mouth daily 6/5/18   Tawana Crow MD   metFORMIN (GLUCOPHAGE-XR) 500 mg 24 hr tablet Take 1 tablet (500 mg total) by mouth 2 (two) times a day 6/5/18   Tawana Crow MD   metoprolol succinate (TOPROL-XL) 100 mg 24 hr tablet Take 1 tablet (100 mg total) by mouth daily for 90 days 6/5/18 9/3/18  Tawana Crow MD   pravastatin (PRAVACHOL) 20 mg tablet Take 1 tablet (20 mg total) by mouth daily for 90 days 6/5/18 9/3/18  Tawana Crow MD           Review of Systems    Review of Systems   Constitutional: Negative for activity change, appetite change, chills, diaphoresis, fatigue and fever  HENT: Negative for congestion, postnasal drip, rhinorrhea, sinus pressure, sneezing and sore throat  Eyes: Negative for pain and visual disturbance  Respiratory: Negative for cough, chest tightness and shortness of breath  Cardiovascular: Negative for chest pain, palpitations and leg swelling  Gastrointestinal: Positive for abdominal distention, abdominal pain, constipation, nausea and vomiting  Negative for diarrhea  Endocrine: Negative for polydipsia, polyphagia and polyuria  Genitourinary: Negative for decreased urine volume, difficulty urinating, dysuria, flank pain, frequency and hematuria  Musculoskeletal: Negative for arthralgias, gait problem, joint swelling and neck pain  Skin: Negative for pallor and rash  Allergic/Immunologic: Negative for immunocompromised state  Neurological: Positive for weakness  Negative for syncope, speech difficulty, light-headedness, numbness and headaches  All other systems reviewed and are negative        Physical Exam      ED Triage Vitals [06/30/18 1840]   Temperature Pulse Respirations Blood Pressure SpO2   99 4 °F (37 4 °C) 83 16 (!) 185/74 99 %      Temp Source Heart Rate Source Patient Position - Orthostatic VS BP Location FiO2 (%)   Oral Monitor Lying Right arm --      Pain Score       --               Physical Exam   Constitutional: She is oriented to person, place, and time  She appears well-developed and well-nourished  No distress  HENT:   Head: Normocephalic and atraumatic  Nose: Nose normal    Mouth/Throat: Oropharynx is clear and moist    Eyes: Conjunctivae, EOM and lids are normal  Pupils are equal, round, and reactive to light  Neck: Normal range of motion  Neck supple  Cardiovascular: Normal rate, regular rhythm and normal heart sounds  Exam reveals no gallop and no friction rub  No murmur heard  Pulmonary/Chest: Effort normal and breath sounds normal  No accessory muscle usage  No respiratory distress  She has no wheezes  She has no rales  Abdominal: Soft  She exhibits no distension  There is generalized tenderness  There is no rebound and no guarding  Neurological: She is alert and oriented to person, place, and time  No cranial nerve deficit or sensory deficit  Skin: Skin is warm and dry  No rash noted  She is not diaphoretic  No erythema  Psychiatric: She has a normal mood and affect  Her speech is normal and behavior is normal  Judgment and thought content normal    Nursing note and vitals reviewed  Assessment and Plan    Kathrine Kolb is a 81 y o  female who presents with abdominal pain  Physical examination remarkable for diffuse abdominal tenderness  Differential diagnosis (not completely inclusive) includes intra-abdominal pathology  Plan will be to perform diagnostic testing and treat symptomatically        MDM    Diagnostic Results          Labs:    Results for orders placed or performed during the hospital encounter of 06/30/18   CBC and differential   Result Value Ref Range    WBC 17 43 (H) 4 31 - 10 16 Thousand/uL    RBC 4 79 3 81 - 5 12 Million/uL    Hemoglobin 12 3 11 5 - 15 4 g/dL    Hematocrit 37 6 34 8 - 46 1 %    MCV 79 (L) 82 - 98 fL    MCH 25 7 (L) 26 8 - 34 3 pg    MCHC 32 7 31 4 - 37 4 g/dL    RDW 14 4 11 6 - 15 1 %    MPV 8 6 (L) 8 9 - 12 7 fL    Platelets 493 800 - 952 Thousands/uL    nRBC 0 /100 WBCs    Neutrophils Relative 79 (H) 43 - 75 %    Immat GRANS % 1 0 - 2 %    Lymphocytes Relative 11 (L) 14 - 44 %    Monocytes Relative 9 4 - 12 %    Eosinophils Relative 0 0 - 6 %    Basophils Relative 0 0 - 1 %    Neutrophils Absolute 13 79 (H) 1 85 - 7 62 Thousands/µL    Immature Grans Absolute 0 11 0 00 - 0 20 Thousand/uL    Lymphocytes Absolute 1 91 0 60 - 4 47 Thousands/µL    Monocytes Absolute 1 53 (H) 0 17 - 1 22 Thousand/µL    Eosinophils Absolute 0 06 0 00 - 0 61 Thousand/µL    Basophils Absolute 0 03 0 00 - 0 10 Thousands/µL   Comprehensive metabolic panel   Result Value Ref Range    Sodium 137 136 - 145 mmol/L    Potassium 4 4 3 5 - 5 3 mmol/L    Chloride 100 100 - 108 mmol/L    CO2 34 (H) 21 - 32 mmol/L    Anion Gap 3 (L) 4 - 13 mmol/L    BUN 18 5 - 25 mg/dL    Creatinine 0 77 0 60 - 1 30 mg/dL    Glucose 112 65 - 140 mg/dL    Calcium 9 4 8 3 - 10 1 mg/dL    AST 31 5 - 45 U/L    ALT 28 12 - 78 U/L    Alkaline Phosphatase 69 46 - 116 U/L    Total Protein 7 9 6 4 - 8 2 g/dL    Albumin 3 1 (L) 3 5 - 5 0 g/dL    Total Bilirubin 0 40 0 20 - 1 00 mg/dL    eGFR 83 ml/min/1 73sq m   Lipase   Result Value Ref Range    Lipase 91 73 - 393 u/L   UA w Reflex to Microscopic w Reflex to Culture   Result Value Ref Range    Color, UA Yellow     Clarity, UA Clear     Specific [place], UA 1 015 1 003 - 1 030    pH, UA 8 0 4 5 - 8 0    Leukocytes, UA Small (A) Negative    Nitrite, UA Negative Negative    Protein, UA Negative Negative mg/dl    Glucose, UA Negative Negative mg/dl    Ketones, UA Negative Negative mg/dl    Urobilinogen, UA 0 2 0 2, 1 0 E U /dl E U /dl    Bilirubin, UA Negative Negative    Blood, UA Negative Negative   Urine Microscopic   Result Value Ref Range    RBC, UA None Seen None Seen, 0-5 /hpf    WBC, UA 10-20 (A) None Seen, 0-5, 5-55, 5-65 /hpf    Epithelial Cells Occasional None Seen, Occasional /hpf    Bacteria, UA Moderate (A) None Seen, Occasional /hpf       All labs reviewed and utilized in the medical decision making process    Radiology:    CT abdomen pelvis with contrast   Final Result         1  Complex 4 8 cm right lower quadrant collection containing gas, fluid as well as peripheral enhancement and a large rounded internal calcification thought to most likely represent an appendicolith in a periappendiceal abscess from a ruptured    appendicitis  Less likely differential diagnosis includes perforated malignancy and associated abscess  Surgical evaluation recommended  2   Lobulated spleen with numerous small splenic mass lesions, possibly hemangiomas  Other splenic lesions not excluded  Consider follow-up surveillance ultrasound in 3 months for further characterization and surveillance purposes  Considerations    related to the patient's age and/or comorbidities may be used to alter these recommendations  3   Mild right hydroureteronephrosis likely secondary to the distal right ureter being enveloped by portions of the right lower quadrant abscess  4   Diverticulosis   5  Enlarged myomatous uterus with large calcified fibroid               I personally discussed this study with AIMEE WHITE on 6/30/2018 at 8:40 PM                   Workstation performed: [id number]             All radiology studies independently viewed by me and interpreted by the radiologist     Procedure    Procedures    CritCare Time      ED Course of Care and Re-Assessments    I spoke with Dr Aranza Francis who will admit patient to the hospital       Medications   sodium chloride 0 9 % bolus 1,000 mL (not administered)   sodium chloride 0 9 % bolus 1,000 mL (0 mL Intravenous Stopped 6/30/18 2016)   ondansetron (ZOFRAN-ODT) dispersible tablet 4 mg (4 mg Oral Given 6/30/18 1916)   fentanyl citrate (PF) 100 MCG/2ML 50 mcg (50 mcg Intravenous Given 6/30/18 1916)   iohexol (OMNIPAQUE) 350 MG/ML injection (MULTI-DOSE) 100 mL (100 mL Intravenous Given 6/30/18 2009)   piperacillin-tazobactam (ZOSYN) 3 375 g in sodium chloride 0 9 % 50 mL IVPB (3 375 g Intravenous New Bag 6/30/18 2132)   metroNIDAZOLE (FLAGYL) IVPB (premix) 500 mg (0 mg Intravenous Stopped 6/30/18 2121)           FINAL IMPRESSION    Final diagnoses:   Perforated appendicitis         DISPOSITION/PLAN      Time reflects when diagnosis was documented in both MDM as applicable and the Disposition within this note     Time User Action Codes Description Comment    6/30/2018  8:56 PM Mireya Bleacher Add [K35 2] Perforated appendicitis     6/30/2018  8:57 PM Mireya Bleacher Add [T81  4XXA] Postprocedural intraabdominal abscess     6/30/2018  8:57 PM Mireya Bleacher L Remove [T81  4XXA] Postprocedural intraabdominal abscess       ED Disposition     ED Disposition Condition Comment    Admit  Case was discussed with ANGELICA and the patient's admission status was agreed to be Admission Status: inpatient status to the service of Dr Jess Cárdenas   Follow-up Information    None           PATIENT REFERRED TO:    No follow-up provider specified      DISCHARGE MEDICATIONS:    Current Discharge Medication List      CONTINUE these medications which have NOT CHANGED    Details   amLODIPine (NORVASC) 5 mg tablet Take 1 tablet (5 mg total) by mouth daily  Qty: 90 tablet, Refills: 3    Associated Diagnoses: Essential hypertension      Calcium Carbonate-Vitamin D3 (CALCIUM 600/VITAMIN D) 600-400 MG-UNIT TABS Take by mouth      hydrochlorothiazide (HYDRODIURIL) 12 5 mg tablet Take 1 tablet (12 5 mg total) by mouth daily  Qty: 90 tablet, Refills: 3    Associated Diagnoses: Essential hypertension      lisinopril (ZESTRIL) 40 mg tablet Take 1 tablet (40 mg total) by mouth daily  Qty: 90 tablet, Refills: 3    Associated Diagnoses: Essential hypertension      metFORMIN (GLUCOPHAGE-XR) 500 mg 24 hr tablet Take 1 tablet (500 mg total) by mouth 2 (two) times a day  Qty: 180 tablet, Refills: 3 Associated Diagnoses: Type 2 diabetes mellitus without complication, without long-term current use of insulin (HCC)      metoprolol succinate (TOPROL-XL) 100 mg 24 hr tablet Take 1 tablet (100 mg total) by mouth daily for 90 days  Qty: 90 tablet, Refills: 0    Associated Diagnoses: Essential hypertension      pravastatin (PRAVACHOL) 20 mg tablet Take 1 tablet (20 mg total) by mouth daily for 90 days  Qty: 90 tablet, Refills: 3    Associated Diagnoses: Mixed hyperlipidemia             No discharge procedures on file           Jeff Beasley, [address] [facility], DO  06/30/18 8652

## 2018-07-01 PROBLEM — K35.32 PERFORATED APPENDICITIS: Status: ACTIVE | Noted: 2018-07-01

## 2018-07-01 PROBLEM — M54.9 BACK PAIN: Status: ACTIVE | Noted: 2018-07-01

## 2018-07-01 PROBLEM — I10 ESSENTIAL HYPERTENSION: Chronic | Status: ACTIVE | Noted: 2018-06-05

## 2018-07-01 PROBLEM — E11.9 TYPE 2 DIABETES MELLITUS WITHOUT COMPLICATION, WITHOUT LONG-TERM CURRENT USE OF INSULIN (HCC): Chronic | Status: ACTIVE | Noted: 2017-10-02

## 2018-07-01 PROBLEM — R10.33 PERIUMBILICAL ABDOMINAL PAIN: Status: ACTIVE | Noted: 2018-07-01

## 2018-07-01 PROBLEM — E78.2 MIXED HYPERLIPIDEMIA: Chronic | Status: ACTIVE | Noted: 2017-10-02

## 2018-07-01 LAB
ANION GAP SERPL CALCULATED.3IONS-SCNC: 8 MMOL/L (ref 4–13)
ATRIAL RATE: 77 BPM
BASOPHILS # BLD AUTO: 0.03 THOUSANDS/ΜL (ref 0–0.1)
BASOPHILS NFR BLD AUTO: 0 % (ref 0–1)
BUN SERPL-MCNC: 11 MG/DL (ref 5–25)
CALCIUM SERPL-MCNC: 8.8 MG/DL (ref 8.3–10.1)
CHLORIDE SERPL-SCNC: 104 MMOL/L (ref 100–108)
CO2 SERPL-SCNC: 30 MMOL/L (ref 21–32)
CREAT SERPL-MCNC: 0.73 MG/DL (ref 0.6–1.3)
EOSINOPHIL # BLD AUTO: 0.08 THOUSAND/ΜL (ref 0–0.61)
EOSINOPHIL NFR BLD AUTO: 1 % (ref 0–6)
ERYTHROCYTE [DISTWIDTH] IN BLOOD BY AUTOMATED COUNT: 14.5 % (ref 11.6–15.1)
GFR SERPL CREATININE-BSD FRML MDRD: 89 ML/MIN/1.73SQ M
GLUCOSE SERPL-MCNC: 104 MG/DL (ref 65–140)
GLUCOSE SERPL-MCNC: 104 MG/DL (ref 65–140)
GLUCOSE SERPL-MCNC: 108 MG/DL (ref 65–140)
HCT VFR BLD AUTO: 36.4 % (ref 34.8–46.1)
HGB BLD-MCNC: 11.8 G/DL (ref 11.5–15.4)
IMM GRANULOCYTES # BLD AUTO: 0.09 THOUSAND/UL (ref 0–0.2)
IMM GRANULOCYTES NFR BLD AUTO: 1 % (ref 0–2)
LACTATE SERPL-SCNC: 0.7 MMOL/L (ref 0.5–2)
LIPASE SERPL-CCNC: 70 U/L (ref 73–393)
LYMPHOCYTES # BLD AUTO: 2.07 THOUSANDS/ΜL (ref 0.6–4.47)
LYMPHOCYTES NFR BLD AUTO: 14 % (ref 14–44)
MAGNESIUM SERPL-MCNC: 1.9 MG/DL (ref 1.6–2.6)
MCH RBC QN AUTO: 25.7 PG (ref 26.8–34.3)
MCHC RBC AUTO-ENTMCNC: 32.4 G/DL (ref 31.4–37.4)
MCV RBC AUTO: 79 FL (ref 82–98)
MONOCYTES # BLD AUTO: 1.46 THOUSAND/ΜL (ref 0.17–1.22)
MONOCYTES NFR BLD AUTO: 10 % (ref 4–12)
NEUTROPHILS # BLD AUTO: 11.41 THOUSANDS/ΜL (ref 1.85–7.62)
NEUTS SEG NFR BLD AUTO: 74 % (ref 43–75)
NRBC BLD AUTO-RTO: 0 /100 WBCS
P AXIS: 61 DEGREES
PHOSPHATE SERPL-MCNC: 2.7 MG/DL (ref 2.3–4.1)
PLATELET # BLD AUTO: 296 THOUSANDS/UL (ref 149–390)
PMV BLD AUTO: 8.7 FL (ref 8.9–12.7)
POTASSIUM SERPL-SCNC: 4 MMOL/L (ref 3.5–5.3)
PR INTERVAL: 170 MS
QRS AXIS: -13 DEGREES
QRSD INTERVAL: 90 MS
QT INTERVAL: 370 MS
QTC INTERVAL: 418 MS
RBC # BLD AUTO: 4.59 MILLION/UL (ref 3.81–5.12)
SODIUM SERPL-SCNC: 142 MMOL/L (ref 136–145)
T WAVE AXIS: 39 DEGREES
TSH SERPL DL<=0.05 MIU/L-ACNC: 0.13 UIU/ML (ref 0.36–3.74)
VENTRICULAR RATE: 77 BPM
WBC # BLD AUTO: 15.14 THOUSAND/UL (ref 4.31–10.16)

## 2018-07-01 PROCEDURE — 83690 ASSAY OF LIPASE: CPT | Performed by: NURSE PRACTITIONER

## 2018-07-01 PROCEDURE — 83605 ASSAY OF LACTIC ACID: CPT | Performed by: NURSE PRACTITIONER

## 2018-07-01 PROCEDURE — 93005 ELECTROCARDIOGRAM TRACING: CPT

## 2018-07-01 PROCEDURE — 85025 COMPLETE CBC W/AUTO DIFF WBC: CPT | Performed by: SURGERY

## 2018-07-01 PROCEDURE — 82948 REAGENT STRIP/BLOOD GLUCOSE: CPT

## 2018-07-01 PROCEDURE — 93010 ELECTROCARDIOGRAM REPORT: CPT | Performed by: INTERNAL MEDICINE

## 2018-07-01 PROCEDURE — 80048 BASIC METABOLIC PNL TOTAL CA: CPT | Performed by: SURGERY

## 2018-07-01 PROCEDURE — 84443 ASSAY THYROID STIM HORMONE: CPT | Performed by: NURSE PRACTITIONER

## 2018-07-01 PROCEDURE — 83735 ASSAY OF MAGNESIUM: CPT | Performed by: NURSE PRACTITIONER

## 2018-07-01 PROCEDURE — 87040 BLOOD CULTURE FOR BACTERIA: CPT | Performed by: NURSE PRACTITIONER

## 2018-07-01 PROCEDURE — 99232 SBSQ HOSP IP/OBS MODERATE 35: CPT | Performed by: FAMILY MEDICINE

## 2018-07-01 PROCEDURE — 84100 ASSAY OF PHOSPHORUS: CPT | Performed by: NURSE PRACTITIONER

## 2018-07-01 RX ORDER — HYDROCHLOROTHIAZIDE 12.5 MG/1
12.5 TABLET ORAL DAILY
Status: DISCONTINUED | OUTPATIENT
Start: 2018-07-01 | End: 2018-07-08 | Stop reason: HOSPADM

## 2018-07-01 RX ORDER — PRAVASTATIN SODIUM 20 MG
20 TABLET ORAL DAILY
Status: DISCONTINUED | OUTPATIENT
Start: 2018-07-01 | End: 2018-07-08 | Stop reason: HOSPADM

## 2018-07-01 RX ORDER — LISINOPRIL 20 MG/1
40 TABLET ORAL DAILY
Status: DISCONTINUED | OUTPATIENT
Start: 2018-07-01 | End: 2018-07-08 | Stop reason: HOSPADM

## 2018-07-01 RX ORDER — AMLODIPINE BESYLATE 5 MG/1
5 TABLET ORAL DAILY
Status: DISCONTINUED | OUTPATIENT
Start: 2018-07-01 | End: 2018-07-08 | Stop reason: HOSPADM

## 2018-07-01 RX ORDER — DEXTROSE AND SODIUM CHLORIDE 5; .45 G/100ML; G/100ML
100 INJECTION, SOLUTION INTRAVENOUS CONTINUOUS
Status: DISCONTINUED | OUTPATIENT
Start: 2018-07-01 | End: 2018-07-03

## 2018-07-01 RX ORDER — ONDANSETRON 2 MG/ML
4 INJECTION INTRAMUSCULAR; INTRAVENOUS EVERY 6 HOURS PRN
Status: DISCONTINUED | OUTPATIENT
Start: 2018-07-01 | End: 2018-07-01

## 2018-07-01 RX ORDER — METOPROLOL SUCCINATE 100 MG/1
100 TABLET, EXTENDED RELEASE ORAL DAILY
Status: DISCONTINUED | OUTPATIENT
Start: 2018-07-01 | End: 2018-07-08 | Stop reason: HOSPADM

## 2018-07-01 RX ADMIN — PIPERACILLIN SODIUM,TAZOBACTAM SODIUM 3.38 G: 3; .375 INJECTION, POWDER, FOR SOLUTION INTRAVENOUS at 04:14

## 2018-07-01 RX ADMIN — AMLODIPINE BESYLATE 5 MG: 5 TABLET ORAL at 08:42

## 2018-07-01 RX ADMIN — PIPERACILLIN SODIUM,TAZOBACTAM SODIUM 3.38 G: 3; .375 INJECTION, POWDER, FOR SOLUTION INTRAVENOUS at 20:30

## 2018-07-01 RX ADMIN — HYDROCHLOROTHIAZIDE 12.5 MG: 12.5 TABLET ORAL at 08:42

## 2018-07-01 RX ADMIN — DEXTROSE AND SODIUM CHLORIDE 100 ML/HR: 5; .45 INJECTION, SOLUTION INTRAVENOUS at 15:45

## 2018-07-01 RX ADMIN — MORPHINE SULFATE 4 MG: 4 INJECTION INTRAVENOUS at 14:53

## 2018-07-01 RX ADMIN — ENOXAPARIN SODIUM 40 MG: 40 INJECTION SUBCUTANEOUS at 08:42

## 2018-07-01 RX ADMIN — MORPHINE SULFATE 4 MG: 4 INJECTION INTRAVENOUS at 07:00

## 2018-07-01 RX ADMIN — SODIUM CHLORIDE 1000 ML: 0.9 INJECTION, SOLUTION INTRAVENOUS at 07:00

## 2018-07-01 RX ADMIN — PIPERACILLIN SODIUM,TAZOBACTAM SODIUM 3.38 G: 3; .375 INJECTION, POWDER, FOR SOLUTION INTRAVENOUS at 08:49

## 2018-07-01 RX ADMIN — MORPHINE SULFATE 4 MG: 4 INJECTION INTRAVENOUS at 02:22

## 2018-07-01 RX ADMIN — PIPERACILLIN SODIUM,TAZOBACTAM SODIUM 3.38 G: 3; .375 INJECTION, POWDER, FOR SOLUTION INTRAVENOUS at 15:02

## 2018-07-01 RX ADMIN — DEXTROSE AND SODIUM CHLORIDE 100 ML/HR: 5; .45 INJECTION, SOLUTION INTRAVENOUS at 20:26

## 2018-07-01 RX ADMIN — LISINOPRIL 40 MG: 20 TABLET ORAL at 08:42

## 2018-07-01 RX ADMIN — METOPROLOL SUCCINATE 100 MG: 100 TABLET, EXTENDED RELEASE ORAL at 08:43

## 2018-07-01 NOTE — ASSESSMENT & PLAN NOTE
Blood Pressure elevated  Continue home regimen of metoprolol, lisinopril, Norvasc and hydrochlorothiazide  Will cover with p r n  hydralazine at this time    Continue monitor

## 2018-07-01 NOTE — H&P
GENERAL SURGERY HISTORY AND PHYSICAL      Angie Johnson 81 y o  female MRN: [id number]  Unit/Bed#: -01 Encounter: [id number]      Assessment/Plan   Patient Active Problem List   Diagnosis    Type 2 diabetes mellitus without complication, without long-term current use of insulin (HCC)    Mixed hyperlipidemia    Abnormal TSH    Leukocytosis    Colitis    Abnormal CT of the abdomen    Essential hypertension    Back pain    Perforated appendicitis    Periumbilical abdominal pain     Patient presenting with CT findings concerning for perforated appendicitis with abscess formation  Plan for IR drainage and continued IV antibiotics  Maintain NPO today case of any intervention  If no plan for intervention okay for liquid diet  No plan for appendectomy at this time, will attempt conservative management and consider interval appendectomy in the future  Should patient fail to recover with drainage and IV antibiotics may require surgical intervention  Discussed with patient that surgical intervention under these circumstances puts her at high risk for damage to surrounding tissue including bowel blood vessel and ureter  Leukocytosis improved on IV antibiotics  Addendum: 11:01 AM   Discussed patient with Dr Renetta Narvaez of IR  Feels fluid collection not amenable to drainage at this time  Will continue with IV antibiotic start clear liquid diet continue to monitor consider repeat imaging if leukocytosis fails to resolve  Chief Complaint abdominal pain    HPI: Angie Johnson is a 81y o  year old female who presents with abdominal pain for 5 days induration  Some associated nausea and vomiting  Patient states pain felt similar to an episode of diverticulitis she was diagnosed with in the past   Had had difficulty with bowel movement  Denies any abdominal distension  Surgical history includes  section cholecystectomy and cervical spine surgery   Denies any bleeding or infectious issues previous operations  Denies any chest pain shortness of breath        ROS:  12 Point ROS reviewed and negative except for:  Abdominal pain nausea vomiting, constipation    Historical Information   Past Medical History:   Diagnosis Date    Diabetes mellitus ([address] )     GERD (gastroesophageal reflux disease)     Hyperlipidemia     Hypertension      Past Surgical History:   Procedure Laterality Date     SECTION      CHOLECYSTECTOMY      NECK SURGERY      [place] COLONOSCOPY FLX DX W/COLLJ SPEC WHEN PFRMD N/A 3/1/2018    Procedure: COLONOSCOPY;  Surgeon: Elder Mehta MD;  Location: [facility]; Service: Gastroenterology     Social History   History   Alcohol Use No     History   Drug Use No     History   Smoking Status    Never Smoker   Smokeless Tobacco    Never Used     Family History: no pertinent family history  No Known Allergies  Meds/Allergies   all current active meds have been reviewed      Objective   Vitals: Blood pressure 158/78, pulse 75, temperature 98 3 °F (36 8 °C), temperature source Oral, resp  rate 18, height 5' 2" (1 575 m), weight 75 5 kg (166 lb 7 2 oz), SpO2 99 %  ,Body mass index is 30 44 kg/m²  Intake/Output Summary (Last 24 hours) at 18 0959  Last data filed at 18 2121   Gross per 24 hour   Intake             1100 ml   Output                0 ml   Net             1100 ml     Invasive Devices     Peripheral Intravenous Line            Peripheral IV 18 Right Wrist less than 1 day                Physical Exam:    General appearance: Awake alert oriented no acute distress  HEENT: Sclera clear, anicterus, no discharge  Neck: Trachea is midline, no adenopathy  Lungs: No respiratory distress, clear to auscultation bilaterally  Heart[de-identified] RRR  Abdomen: soft, nondistended, nontender, obese     Extremities: No edema, nontender  Skin:No rashes or lesions  Neurologic: No weakness or loss of sensation  Psych: Affect appropriate    Imaging:  CT shows focal fluid collection with associated fat stranding in the right lower quadrant  Calcified fecalith present within the fluid collection  Findings most consistent with perforated appendicitis with abscess  No free air no significant free fluid      Adam García MD  7/1/2018

## 2018-07-01 NOTE — CONSULTS
Consult- Tran Pierson 1935, 81 y o  female MRN: [id number]    Unit/Bed#: -01 Encounter: [id number]    Primary Care Provider: Leann Pal MD   Date and time admitted to hospital: 6/30/2018  6:30 PM      Inpatient consult to Internal Medicine  Consult performed by: Fanny Lewis ordered by: Isauro Palma abdominal pain   Assessment & Plan    Complex 4 8 cm right lower quadrant collection containing gas, fluid as well as peripheral enhancement and a large rounded internal calcification thought to most likely represent an appendicolith in a periappendiceal abscess from a ruptured   appendicitis   Less likely differential diagnosis includes perforated malignancy and associated abscess  General Surgery [place]        Perforated appendicitis   Assessment & Plan    Complex 4 8 cm right lower quadrant collection containing gas, fluid as well as peripheral enhancement and a large rounded internal calcification thought to most likely represent an appendicolith in a periappendiceal abscess from a ruptured   appendicitis   Less likely differential diagnosis includes perforated malignancy and associated abscess  General Surgery Managing  Essential hypertension   Assessment & Plan    Blood Pressure elevated  Continue home regimen of metoprolol, lisinopril, Norvasc and hydrochlorothiazide  Will cover with p r n  hydralazine at this time  Continue monitor          Abnormal CT of the abdomen   Assessment & Plan    Per CT, Enlarged myomatous uterus with large calcified fibroid  Pt will need outpatient GYN follow up  Leukocytosis   Assessment & Plan    In the setting of acute appendicitis with her  Patient does not meet SIRS criteria  Manage by General surgery  Monitor WBC and temp  Blood cultures  Continue Zosyn IV          Type 2 diabetes mellitus without complication, without long-term current use of insulin [facility])   Assessment & Plan    Lab Results Component Value Date    HGBA1C 6 2 2018       Recent Labs      18   2355   POCGLU  110     Hold Metformin  SSI, monitor BS Q 6 hours  Low Carb Diet  Blood Sugar Average: Last 72 hrs:  (P) 110        * Back pain   Assessment & Plan    Right lower back  Secondary to Mild right hydroureteronephrosis likely secondary to the distal right ureter being enveloped by portions of the right lower quadrant abscess  General surgery managing  Urine culture attending              VTE Prophylaxis: Enoxaparin (Lovenox)  / sequential compression device     Recommendations for Discharge:  · Follow Up with PCP and GS recommendation     Counseling / Coordination of Care Time: 50   Greater than 50% of total time spent on patient counseling and coordination of care  Collaboration of Care: Were Recommendations Directly Discussed with Primary Treatment Team? - No     History of Present Illness:    Ruddy Nicholson is a 81 y o  female who is originally admitted to the General Surgery  service due to back and abdominal pain  We are consulted to manage Diabetes, HTN  Rocio Lightning Review of Systems:    Review of Systems   Constitutional: Positive for activity change, appetite change, chills and fever  Gastrointestinal: Positive for abdominal pain (periumbilical), constipation, nausea and vomiting  Musculoskeletal: Positive for back pain  Right lower back Pain   All other systems reviewed and are negative  Past Medical and Surgical History:     Past Medical History:   Diagnosis Date    Diabetes mellitus ([address] )     GERD (gastroesophageal reflux disease)     Hyperlipidemia     Hypertension        Past Surgical History:   Procedure Laterality Date     SECTION      CHOLECYSTECTOMY      NECK SURGERY      ID COLONOSCOPY FLX DX W/COLLJ SPEC WHEN PFRMD N/A 3/1/2018    Procedure: COLONOSCOPY;  Surgeon: Kati Gill MD;  Location: [facility];   Service: Gastroenterology       Meds/Allergies:    all medications and allergies reviewed    Allergies: No Known Allergies    Social History:     Marital Status:     Substance Use History:   History   Alcohol Use No     History   Smoking Status    Never Smoker   Smokeless Tobacco    Never Used     History   Drug Use No       Family History:    non-contributory    Physical Exam:     Vitals:   Blood Pressure: (!) 172/74 (06/30/18 2222)  Pulse: 84 (06/30/18 2222)  Temperature: 98 8 °F (37 1 °C) (06/30/18 2222)  Temp Source: Oral (06/30/18 2222)  Respirations: 16 (06/30/18 2222)  Height: 5' 2" (157 5 cm) (06/30/18 2222)  Weight - Scale: 75 5 kg (166 lb 7 2 oz) (06/30/18 2222)  SpO2: 97 % (06/30/18 2222)    Physical Exam   Constitutional: She is oriented to person, place, and time  She appears well-developed and well-nourished  She appears distressed (mild, pain)  HENT:   Head: Normocephalic and atraumatic  Neck: Normal range of motion  Neck supple  Cardiovascular: Normal rate, regular rhythm and normal heart sounds  No murmur heard  Pulmonary/Chest: Effort normal  No accessory muscle usage  No respiratory distress  Abdominal: Soft  Bowel sounds are normal  She exhibits no distension  There is tenderness in the periumbilical area  There is rebound and guarding  Nausea   Musculoskeletal: Normal range of motion  She exhibits no edema  Back Pain   Neurological: She is alert and oriented to person, place, and time  Skin: Skin is warm and dry  She is not diaphoretic  Psychiatric: She has a normal mood and affect  Her behavior is normal    Nursing note and vitals reviewed  Additional Data:     Lab Results: I have personally reviewed pertinent reports          Results from last 7 days  Lab Units 06/30/18 1917   WBC Thousand/uL 17 43*   HEMOGLOBIN g/dL 12 3   HEMATOCRIT % 37 6   PLATELETS Thousands/uL 304   NEUTROS PCT % 79*   LYMPHS PCT % 11*   MONOS PCT % 9   EOS PCT % 0       Results from last 7 days  Lab Units 06/30/18 1917   SODIUM mmol/L 137 POTASSIUM mmol/L 4 4   CHLORIDE mmol/L 100   CO2 mmol/L 34*   BUN mg/dL 18   CREATININE mg/dL 0 77   CALCIUM mg/dL 9 4   TOTAL PROTEIN g/dL 7 9   BILIRUBIN TOTAL mg/dL 0 40   ALK PHOS U/L 69   ALT U/L 28   AST U/L 31   GLUCOSE RANDOM mg/dL 112             Lab Results   Component Value Date/Time    HGBA1C 6 2 06/05/2018 12:07 PM    HGBA1C 6 4 (H) 10/14/2017 08:19 AM       Results from last 7 days  Lab Units 06/30/18  2355   POC GLUCOSE mg/dl 110       Imaging: I have personally reviewed pertinent reports  CT abdomen pelvis with contrast   Final Result by Augustus Salinas MD (06/30 2044)         1  Complex 4 8 cm right lower quadrant collection containing gas, fluid as well as peripheral enhancement and a large rounded internal calcification thought to most likely represent an appendicolith in a periappendiceal abscess from a ruptured    appendicitis  Less likely differential diagnosis includes perforated malignancy and associated abscess  Surgical evaluation recommended  2   Lobulated spleen with numerous small splenic mass lesions, possibly hemangiomas  Other splenic lesions not excluded  Consider follow-up surveillance ultrasound in 3 months for further characterization and surveillance purposes  Considerations    related to the patient's age and/or comorbidities may be used to alter these recommendations  3   Mild right hydroureteronephrosis likely secondary to the distal right ureter being enveloped by portions of the right lower quadrant abscess  4   Diverticulosis   5  Enlarged myomatous uterus with large calcified fibroid  I personally discussed this study with Doug Bowden on 6/30/2018 at 8:40 PM                   Workstation performed: [id number]         IR consult    (Results Pending)       EKG, Pathology, and Other Studies Reviewed on Admission:   · EKG: Ordered    ** Please Note: This note has been constructed using a voice recognition system  **

## 2018-07-01 NOTE — ASSESSMENT & PLAN NOTE
In the setting of acute appendicitis with her  Patient does not meet SIRS criteria  Manage by General surgery  Monitor WBC and temp  Blood cultures  Continue Zosyn IV

## 2018-07-01 NOTE — ASSESSMENT & PLAN NOTE
Right lower back  Secondary to Mild right hydroureteronephrosis likely secondary to the distal right ureter being enveloped by portions of the right lower quadrant abscess  General surgery managing    Urine culture attending

## 2018-07-01 NOTE — ASSESSMENT & PLAN NOTE
Lab Results   Component Value Date    HGBA1C 6 2 06/05/2018       Recent Labs      06/30/18   2355   POCGLU  110     Hold Metformin  SSI, monitor BS Q 6 hours  Low Carb Diet       Blood Sugar Average: Last 72 hrs:  (P) 110

## 2018-07-01 NOTE — ASSESSMENT & PLAN NOTE
Per CT, Enlarged myomatous uterus with large calcified fibroid  Pt will need outpatient GYN follow up

## 2018-07-01 NOTE — ASSESSMENT & PLAN NOTE
Complex 4 8 cm right lower quadrant collection containing gas, fluid as well as peripheral enhancement and a large rounded internal calcification thought to most likely represent an appendicolith in a periappendiceal abscess from a ruptured   appendicitis   Less likely differential diagnosis includes perforated malignancy and associated abscess  General Surgery [place]

## 2018-07-01 NOTE — ASSESSMENT & PLAN NOTE
Complex 4 8 cm right lower quadrant collection containing gas, fluid as well as peripheral enhancement and a large rounded internal calcification thought to most likely represent an appendicolith in a periappendiceal abscess from a ruptured   appendicitis   Less likely differential diagnosis includes perforated malignancy and associated abscess  General Surgery Managing

## 2018-07-02 LAB
ANION GAP SERPL CALCULATED.3IONS-SCNC: 5 MMOL/L (ref 4–13)
BACTERIA UR CULT: ABNORMAL
BASOPHILS # BLD AUTO: 0.04 THOUSANDS/ΜL (ref 0–0.1)
BASOPHILS NFR BLD AUTO: 0 % (ref 0–1)
BUN SERPL-MCNC: 6 MG/DL (ref 5–25)
CA-I BLD-SCNC: 1.13 MMOL/L (ref 1.12–1.32)
CALCIUM SERPL-MCNC: 9 MG/DL (ref 8.3–10.1)
CHLORIDE SERPL-SCNC: 103 MMOL/L (ref 100–108)
CO2 SERPL-SCNC: 31 MMOL/L (ref 21–32)
CREAT SERPL-MCNC: 0.7 MG/DL (ref 0.6–1.3)
EOSINOPHIL # BLD AUTO: 0.1 THOUSAND/ΜL (ref 0–0.61)
EOSINOPHIL NFR BLD AUTO: 1 % (ref 0–6)
ERYTHROCYTE [DISTWIDTH] IN BLOOD BY AUTOMATED COUNT: 14.5 % (ref 11.6–15.1)
GFR SERPL CREATININE-BSD FRML MDRD: 93 ML/MIN/1.73SQ M
GLUCOSE SERPL-MCNC: 138 MG/DL (ref 65–140)
GLUCOSE SERPL-MCNC: 144 MG/DL (ref 65–140)
GLUCOSE SERPL-MCNC: 158 MG/DL (ref 65–140)
GLUCOSE SERPL-MCNC: 169 MG/DL (ref 65–140)
HCT VFR BLD AUTO: 35.5 % (ref 34.8–46.1)
HGB BLD-MCNC: 11.7 G/DL (ref 11.5–15.4)
IMM GRANULOCYTES # BLD AUTO: 0.1 THOUSAND/UL (ref 0–0.2)
IMM GRANULOCYTES NFR BLD AUTO: 1 % (ref 0–2)
LYMPHOCYTES # BLD AUTO: 2.15 THOUSANDS/ΜL (ref 0.6–4.47)
LYMPHOCYTES NFR BLD AUTO: 15 % (ref 14–44)
MAGNESIUM SERPL-MCNC: 2.2 MG/DL (ref 1.6–2.6)
MCH RBC QN AUTO: 25.7 PG (ref 26.8–34.3)
MCHC RBC AUTO-ENTMCNC: 33 G/DL (ref 31.4–37.4)
MCV RBC AUTO: 78 FL (ref 82–98)
MONOCYTES # BLD AUTO: 1.65 THOUSAND/ΜL (ref 0.17–1.22)
MONOCYTES NFR BLD AUTO: 11 % (ref 4–12)
NEUTROPHILS # BLD AUTO: 10.49 THOUSANDS/ΜL (ref 1.85–7.62)
NEUTS SEG NFR BLD AUTO: 72 % (ref 43–75)
NRBC BLD AUTO-RTO: 0 /100 WBCS
PLATELET # BLD AUTO: 291 THOUSANDS/UL (ref 149–390)
PMV BLD AUTO: 8.5 FL (ref 8.9–12.7)
POTASSIUM SERPL-SCNC: 3.6 MMOL/L (ref 3.5–5.3)
RBC # BLD AUTO: 4.55 MILLION/UL (ref 3.81–5.12)
SODIUM SERPL-SCNC: 139 MMOL/L (ref 136–145)
WBC # BLD AUTO: 14.53 THOUSAND/UL (ref 4.31–10.16)

## 2018-07-02 PROCEDURE — 85025 COMPLETE CBC W/AUTO DIFF WBC: CPT | Performed by: SURGERY

## 2018-07-02 PROCEDURE — 80048 BASIC METABOLIC PNL TOTAL CA: CPT | Performed by: SURGERY

## 2018-07-02 PROCEDURE — 83735 ASSAY OF MAGNESIUM: CPT | Performed by: SURGERY

## 2018-07-02 PROCEDURE — 99232 SBSQ HOSP IP/OBS MODERATE 35: CPT | Performed by: PHYSICIAN ASSISTANT

## 2018-07-02 PROCEDURE — 82330 ASSAY OF CALCIUM: CPT | Performed by: SURGERY

## 2018-07-02 PROCEDURE — 82948 REAGENT STRIP/BLOOD GLUCOSE: CPT

## 2018-07-02 PROCEDURE — 99232 SBSQ HOSP IP/OBS MODERATE 35: CPT | Performed by: FAMILY MEDICINE

## 2018-07-02 RX ORDER — OXYCODONE HYDROCHLORIDE AND ACETAMINOPHEN 5; 325 MG/1; MG/1
1 TABLET ORAL EVERY 4 HOURS PRN
Status: DISCONTINUED | OUTPATIENT
Start: 2018-07-02 | End: 2018-07-08 | Stop reason: HOSPADM

## 2018-07-02 RX ORDER — ACETAMINOPHEN 325 MG/1
650 TABLET ORAL EVERY 6 HOURS PRN
Status: DISCONTINUED | OUTPATIENT
Start: 2018-07-02 | End: 2018-07-03

## 2018-07-02 RX ORDER — KETOROLAC TROMETHAMINE 30 MG/ML
15 INJECTION, SOLUTION INTRAMUSCULAR; INTRAVENOUS ONCE
Status: COMPLETED | OUTPATIENT
Start: 2018-07-02 | End: 2018-07-02

## 2018-07-02 RX ADMIN — KETOROLAC TROMETHAMINE 15 MG: 30 INJECTION, SOLUTION INTRAMUSCULAR at 18:38

## 2018-07-02 RX ADMIN — INSULIN LISPRO 1 UNITS: 100 INJECTION, SOLUTION INTRAVENOUS; SUBCUTANEOUS at 17:22

## 2018-07-02 RX ADMIN — HYDROCHLOROTHIAZIDE 12.5 MG: 12.5 TABLET ORAL at 07:34

## 2018-07-02 RX ADMIN — DEXTROSE AND SODIUM CHLORIDE 100 ML/HR: 5; .45 INJECTION, SOLUTION INTRAVENOUS at 23:04

## 2018-07-02 RX ADMIN — PIPERACILLIN SODIUM,TAZOBACTAM SODIUM 3.38 G: 3; .375 INJECTION, POWDER, FOR SOLUTION INTRAVENOUS at 23:04

## 2018-07-02 RX ADMIN — AMLODIPINE BESYLATE 5 MG: 5 TABLET ORAL at 07:31

## 2018-07-02 RX ADMIN — PIPERACILLIN SODIUM,TAZOBACTAM SODIUM 3.38 G: 3; .375 INJECTION, POWDER, FOR SOLUTION INTRAVENOUS at 11:49

## 2018-07-02 RX ADMIN — PIPERACILLIN SODIUM,TAZOBACTAM SODIUM 3.38 G: 3; .375 INJECTION, POWDER, FOR SOLUTION INTRAVENOUS at 03:00

## 2018-07-02 RX ADMIN — PRAVASTATIN SODIUM 20 MG: 20 TABLET ORAL at 10:35

## 2018-07-02 RX ADMIN — DEXTROSE AND SODIUM CHLORIDE 100 ML/HR: 5; .45 INJECTION, SOLUTION INTRAVENOUS at 11:49

## 2018-07-02 RX ADMIN — ENOXAPARIN SODIUM 40 MG: 40 INJECTION SUBCUTANEOUS at 10:35

## 2018-07-02 RX ADMIN — ACETAMINOPHEN 650 MG: 325 TABLET, FILM COATED ORAL at 18:38

## 2018-07-02 RX ADMIN — LISINOPRIL 40 MG: 20 TABLET ORAL at 07:32

## 2018-07-02 RX ADMIN — METOPROLOL SUCCINATE 100 MG: 100 TABLET, EXTENDED RELEASE ORAL at 07:33

## 2018-07-02 RX ADMIN — PIPERACILLIN SODIUM,TAZOBACTAM SODIUM 3.38 G: 3; .375 INJECTION, POWDER, FOR SOLUTION INTRAVENOUS at 17:23

## 2018-07-02 RX ADMIN — DEXTROSE AND SODIUM CHLORIDE 100 ML/HR: 5; .45 INJECTION, SOLUTION INTRAVENOUS at 02:39

## 2018-07-02 NOTE — PLAN OF CARE
Problem: DISCHARGE PLANNING - CARE MANAGEMENT  Goal: Discharge to post-acute care or home with appropriate resources  INTERVENTIONS:  - Conduct assessment to determine patient/family and health care team treatment goals, and need for post-acute services based on payer coverage, community resources, and patient preferences, and barriers to discharge  - Address psychosocial, clinical, and financial barriers to discharge as identified in assessment in conjunction with the patient/family and health care team  - Arrange appropriate level of post-acute services according to patient's   needs and preference and payer coverage in collaboration with the physician and health care team  - Communicate with and update the patient/family, physician, and health care team regarding progress on the discharge plan  - Arrange appropriate transportation to post-acute venues  Outcome: Progressing  CM met with patient at bedside  Patient states her and her dtr-Na live together in a two story townhouse  There is one steps to enter the house from outside  Patient does not use DME at home  Patient states she does have a walker for long distances  Patient is independent with ADLs  Patient does not have rehab hx and HHC hx  Patient fills her prescriptions with [facility] [place]  Patient denies mental health dx hx and substance abuse hx  Patient states her dtr  Mendel Revels is her POA  Patient is retired  Patient's daughter drives patient   to her doctor's appointments  Patient states once cleared for discharge one of her children will transport her home  Patient has three children  CM will follow patient's needs  Nurse and SLIM notified

## 2018-07-02 NOTE — ASSESSMENT & PLAN NOTE
In the setting of acute appendicitis with her  No IR Candidate for draining  Patient does not meet SIRS criteria  Manage by General surgery  Monitor WBC and temp  Blood cultures  Continue Zosyn IV    Sing off  Re-consult as need it

## 2018-07-02 NOTE — CONSULTS
Consult- Lee Cabrera 1935, 81 y o  female MRN: [id number]    Unit/Bed#: -01 Encounter: [id number]    Primary Care Provider: Annalisa Howard MD   Date and time admitted to hospital: 6/30/2018  6:30 PM      Consults    * Perforated appendicitis   Assessment & Plan    Complex 4 8 cm right lower quadrant collection containing gas, fluid as well as peripheral enhancement and a large rounded internal calcification thought to most likely represent an appendicolith in a periappendiceal abscess from a ruptured   appendicitis  [facility] improving  General Surgery Managing  Cont IV antibiotic        Back pain   Assessment & Plan    Right lower back  Secondary to Mild right hydroureteronephrosis likely secondary to the distal right ureter being enveloped by portions of the right lower quadrant abscess  General surgery managing  No UTI        Essential hypertension   Assessment & Plan    stable  Continue home regimen of metoprolol, lisinopril, Norvasc and hydrochlorothiazide  Will cover with p r n  hydralazine at this time  Continue monitor          Abnormal CT of the abdomen   Assessment & Plan    Per CT, Enlarged myomatous uterus with large calcified fibroid  Leukocytosis   Assessment & Plan    In the setting of acute appendicitis with her  No IR Candidate for draining  Patient does not meet SIRS criteria  Manage by General surgery  Monitor WBC and temp  Blood cultures  Continue Zosyn IV  Sing off  Re-consult as need it         Mixed hyperlipidemia   Assessment & Plan    Cont Statin        Type 2 diabetes mellitus without complication, without long-term current use of insulin [facility])   Assessment & Plan    Lab Results   Component Value Date    HGBA1C 6 2 06/05/2018       Recent Labs      06/30/18   2355  07/01/18   0542  07/01/18   1219   POCGLU  110  108  104     Hold Metformin  SSI, monitor BS Q 6 hours  Low Carb Diet       Blood Sugar Average: Last 72 hrs:  (P) 827 [id number]   Cont SSI          VTE Pharmacologic Prophylaxis:   Pharmacologic: Enoxaparin (Lovenox)  Mechanical VTE Prophylaxis in Place: Yes    Patient Centered Rounds: I have performed bedside rounds with nursing staff today  Discussions with Specialists or Other Care Team Provider: general surgery    Education and Discussions with Family / Patient: patient     Time Spent for Care: 20 minutes  More than 50% of total time spent on counseling and coordination of care as described above  Current Length of Stay: 2 day(s)    Current Patient Status: Inpatient   Certification Statement: The patient will continue to require additional inpatient hospital stay due to acute above condition on IV antibiotic    Discharge Plan: depend on clinical course    Code Status: Level 1 - Full Code      Subjective:   Patient states she is better, abd pain improving    Objective:     Vitals:   Temp (24hrs), Av 1 °F (37 8 °C), Min:99 1 °F (37 3 °C), Max:100 7 °F (38 2 °C)    HR:  [83-91] 84  Resp:  [18-20] 20  BP: (160-197)/(62-86) 168/62  SpO2:  [96 %-100 %] 100 %  Body mass index is 30 44 kg/m²  Input and Output Summary (last 24 hours): Intake/Output Summary (Last 24 hours) at 18 1214  Last data filed at 18 2032   Gross per 24 hour   Intake             1050 ml   Output              300 ml   Net              750 ml       Physical Exam:     Physical Exam   Constitutional: She is oriented to person, place, and time  No distress  Cardiovascular: Normal rate, regular rhythm, normal heart sounds and intact distal pulses  Exam reveals no gallop  Pulmonary/Chest: No respiratory distress  She has no wheezes  She has no rales  She exhibits no tenderness  Abdominal: There is tenderness  There is guarding  Musculoskeletal: She exhibits no edema, tenderness or deformity  Neurological: She is alert and oriented to person, place, and time  She has normal reflexes  She displays normal reflexes  No cranial nerve deficit  Skin: Skin is warm  She is not diaphoretic  Psychiatric: She has a normal mood and affect  Additional Data:     Labs:      Results from last 7 days  Lab Units 07/02/18  0506   WBC Thousand/uL 14 53*   HEMOGLOBIN g/dL 11 7   HEMATOCRIT % 35 5   PLATELETS Thousands/uL 291   NEUTROS PCT % 72   LYMPHS PCT % 15   MONOS PCT % 11   EOS PCT % 1       Results from last 7 days  Lab Units 07/02/18  0506  06/30/18  1917   SODIUM mmol/L 139  < > 137   POTASSIUM mmol/L 3 6  < > 4 4   CHLORIDE mmol/L 103  < > 100   CO2 mmol/L 31  < > 34*   BUN mg/dL 6  < > 18   CREATININE mg/dL 0 70  < > 0 77   CALCIUM mg/dL 9 0  < > 9 4   TOTAL PROTEIN g/dL  --   --  7 9   BILIRUBIN TOTAL mg/dL  --   --  0 40   ALK PHOS U/L  --   --  69   ALT U/L  --   --  28   AST U/L  --   --  31   GLUCOSE RANDOM mg/dL 138  < > 112   < > = values in this interval not displayed  * I Have Reviewed All Lab Data Listed Above  * Additional Pertinent Lab Tests Reviewed:  All Labs Within Last 24 Hours Reviewed    Imaging:    Imaging Reports Reviewed Today Include:   Imaging Personally Reviewed by Myself Includes:      Recent Cultures (last 7 days):       Results from last 7 days  Lab Units 06/30/18 2049   URINE CULTURE  <10,000 cfu/ml Gram Negative Jas*       Last 24 Hours Medication List:     Current Facility-Administered Medications:  amLODIPine 5 mg Oral Daily DEYSI Winston    dextrose 5 % and sodium chloride 0 45 % 100 mL/hr Intravenous Continuous Torie Woodall MD Last Rate: 100 mL/hr (07/02/18 1149)   enoxaparin 40 mg Subcutaneous Daily Mehreen Mensah MD    hydrochlorothiazide 12 5 mg Oral Daily DEYSI Winston    insulin lispro 1-5 Units Subcutaneous TID  Juve Cox MD    lisinopril 40 mg Oral Daily DEYSI Winston    metoprolol succinate 100 mg Oral Daily DEYSI Winston    ondansetron 4 mg Intravenous Q6H PRN Torie Woodall MD    oxyCODONE-acetaminophen 1 tablet Oral Q4H PRN Loreto Cure MOHAMUD Barillas piperacillin-tazobactam 3 375 g Intravenous Q6H Nicole Hernandez MD Last Rate: 3 375 g (07/02/18 1149)   pravastatin 20 mg Oral Daily DEYSI Winston    sodium chloride 1,000 mL Intravenous Once Nemo [place], DO         Today, Patient Was Seen By: Anita Pina MD    ** Please Note: Dragon 360 Dictation voice to text software may have been used in the creation of this document   **

## 2018-07-02 NOTE — CASE MANAGEMENT
Initial Clinical Review    Admission: Date/Time/Statement: inpatient 6/30/18 @ 205     Orders Placed This Encounter   Procedures    Inpatient Admission (expected length of stay for this patient is greater than two midnights)     Standing Status:   Standing     Number of Occurrences:   1     Order Specific Question:   Admitting Physician     Answer:   Bárbara Guerra [[id number]]     Order Specific Question:   Level of Care     Answer:   Med Surg [[id number]]     Order Specific Question:   Estimated length of stay     Answer:   More than 2 Midnights     Order Specific Question:   Certification     Answer:   I certify that inpatient services are medically necessary for this patient for a duration of greater than two midnights  See H&P and MD Progress Notes for additional information about the patient's course of treatment  ED Arrival Information     Expected Arrival Acuity Means of Arrival Escorted By Service Admission Type    - 6/30/2018 18:28 Urgent Wheelchair Family Member Surgery-General Urgent    Arrival Complaint    BACK PAIN         Chief Complaint   Patient presents with    Back Pain     pt reports pain in abdomen x 3days  ago and pt reports vomitting  as per pt, abdominal pain has turned into back pain  History of Illness: 80 yo fem c/o 5 days abd pain assoc n/v  Similar to prior diverticulitis  Difficulty with BM  Ct shows perforated appendicitis with abscess formation       ED Vital Signs:   ED Triage Vitals   Temperature Pulse Respirations Blood Pressure SpO2   06/30/18 1840 06/30/18 1840 06/30/18 1840 06/30/18 1840 06/30/18 1840   99 4 °F (37 4 °C) 83 16 (!) 185/74 99 %      Temp Source Heart Rate Source Patient Position - Orthostatic VS BP Location FiO2 (%)   06/30/18 1840 06/30/18 1840 06/30/18 1840 06/30/18 1840 --   Oral Monitor Lying Right arm       Pain Score       06/30/18 2220       5        Wt Readings from Last 1 Encounters:   06/30/18 75 5 kg (166 lb 7 2 oz)       Vital Signs (abnormal): 197/86 172/78   174/72  100 3   100 7    100 2    Abnormal Labs/Diagnostic Test Results:   6/30: co2 34   A gap 3   Wbc 17 43    UA: sm leuks   occ epithlelials   10-20 wbc   Mod bacteria  Urine c&s:   Urine Culture <10,000 cfu/ml Gram Negative Jas        CT a&p:   1   Complex 4 8 cm right lower quadrant collection containing gas, fluid as well as peripheral enhancement and a large rounded internal calcification thought to most likely represent an appendicolith in a periappendiceal abscess from a ruptured appendicitis   Less likely differential diagnosis includes perforated malignancy and associated abscess   Surgical evaluation recommended  2   Lobulated spleen with numerous small splenic mass lesions, possibly hemangiomas   Other splenic lesions not excluded   Consider follow-up surveillance ultrasound in 3 months for further characterization and surveillance purposes  Considerations related to the patient's age and/or comorbidities may be used to alter these recommendations     3   Mild right hydroureteronephrosis likely secondary to the distal right ureter being enveloped by portions of the right lower quadrant abscess  4   Diverticulosis5   Enlarged myomatous uterus with large calcified fibroid    7/1: wbc 15 14  tsh  132  bld c&s pending  EKG: nsr    7/2: wbc 14 53   Gluc 144    ED Treatment:   Medication Administration from 06/30/2018 1828 to 06/30/2018 2157       Date/Time Order Dose Route Action Action by Comments     06/30/2018 1916 sodium chloride 0 9 % bolus 1,000 mL  Then 125/hr 1,000 mL Intravenous [address] Stephen Harvey RN      06/30/2018 1916 ondansetron (ZOFRAN-ODT) dispersible tablet 4 mg 4 mg Oral Given Stephen Harvey RN      06/30/2018 1916 fentanyl citrate (PF) 100 MCG/2ML 50 mcg 50 mcg Intravenous Given Stephen Harvey RN      06/30/2018 2132 piperacillin-tazobactam (ZOSYN) 3 375 g in sodium chloride 0 9 % 50 mL IVPB 3 375 g Intravenous [address] Stephen Harvey RN      06/30/2018 2051 metroNIDAZOLE (FLAGYL) IVPB (premix) 500 mg 500 mg Intravenous New Bag Ford Gallagher RN         Past Medical/Surgical History: Active Ambulatory Problems     Diagnosis Date Noted    Type 2 diabetes mellitus without complication, without long-term current use of insulin ([facility] [address] ) 10/02/2017    Mixed hyperlipidemia 10/02/2017    Abnormal TSH 10/16/2017    Leukocytosis 10/16/2017    Colitis 01/30/2018    Abnormal CT of the abdomen 02/23/2018    Essential hypertension 06/05/2018     Resolved Ambulatory Problems     Diagnosis Date Noted    Benign essential hypertension 10/02/2017    Obesity 10/02/2017    Abdominal pain with vomiting 02/23/2018     Past Medical History:   Diagnosis Date    Diabetes mellitus ([facility] [address] )     GERD (gastroesophageal reflux disease)     Hyperlipidemia     Hypertension        Admitting Diagnosis: Back pain [M54 9]  Perforated appendicitis [K35 2]    Age/Sex: 81 y o  female    Assessment/Plan:   Plan for IR drainage and continued IV antibiotics  Maintain NPO today case of any intervention  If no plan for intervention okay for liquid diet  No plan for appendectomy at this time, will attempt conservative management and consider interval appendectomy in the future  Should patient fail to recover with drainage and IV antibiotics may require surgical intervention    Discussed with patient that surgical intervention under these circumstances puts her at high risk for damage to surrounding tissue including bowel blood vessel and ureter  Per IR: fluid collection now amenable to drainage, continue w/IV antbx, clear liquids and consider rpt imaging if no improvement      Admission Orders:  Scheduled Meds:   Current Facility-Administered Medications:  amLODIPine 5 mg Oral Daily   dextrose 5 % and sodium chloride 0 45 % 100 mL/hr Intravenous Continuous   enoxaparin 40 mg Subcutaneous Daily   hydrochlorothiazide 12 5 mg Oral Daily   insulin lispro 1-5 Units Subcutaneous TID AC   lisinopril 40 mg Oral Daily   metoprolol succinate 100 mg Oral Daily   ondansetron 4 mg Intravenous Q6H PRN   oxyCODONE-acetaminophen 1 tablet Oral Q4H PRN   piperacillin-tazobactam 3 375 g Intravenous Q6H   pravastatin 20 mg Oral Daily   sodium chloride  1,000 mL IV Once   On 7/1 @0700   IV morphine x 1 on 6/30, x 3 on 7/1    NPO, change to Clear liquids  oob as farshad  Gluc checks ac / hs  Bmp, ion ca, cbc, mg in am  SCD's  I/O      PER SURG 7/2: IV zosyn for medical management of perf appendicitis with abscess, febrile last night, consider surgical intervention if fails to improve or if worsens   Pain control, encourage OOB, dvt prophy    PER MED 7/2: back pain 2nd to R hydroureteronephrosis likely 2nd to R distal ureter enveloped by portions of the abscess

## 2018-07-02 NOTE — ASSESSMENT & PLAN NOTE
Lab Results   Component Value Date    HGBA1C 6 2 06/05/2018       Recent Labs      06/30/18   2355  07/01/18   0542  07/01/18   1219   POCGLU  110  108  104     Hold Metformin  SSI, monitor BS Q 6 hours  Low Carb Diet       Blood Sugar Average: Last 72 hrs:  (P) 445 [id number]   Cont SSI

## 2018-07-02 NOTE — ASSESSMENT & PLAN NOTE
stable  Continue home regimen of metoprolol, lisinopril, Norvasc and hydrochlorothiazide  Will cover with p r n  hydralazine at this time    Continue monitor

## 2018-07-02 NOTE — SOCIAL WORK
CM met with patient at bedside  Patient states her and her dtrSukhjinder live together in a two story townhouse  There is one steps to enter the house from outside  Patient does not use DME at home  Patient states she does have a walker for long distances  Patient is independent with ADLs  Patient does not have rehab hx and HHC hx  Patient [facility] her [facility]  Patient denies mental health dx hx and substance abuse hx  Patient states her dtr  Joan George is her POA  Patient is retired  Patient's daughter drives patient   to her doctor's appointments  Patient states once cleared for discharge one of her children will transport her home  Patient has three children  CM will follow patient's needs  Nurse and SLIM notified  CM reviewed discharge planning process including the following: identifying help at home, patient preference for discharge planning needs, pharmacy preference, and availability of treatment team to discuss questions or concerns patient and/or family may have regarding understanding medications and recognizing signs and symptoms once discharged  CM also encouraged patient to follow up with all recommended appointments after discharge  Patient advised of importance for patient and family to participate in managing patients medical well being  CM name and role reviewed and Discharge Checklist provided  Encouraged patient and caregiver to review prior to discharge

## 2018-07-02 NOTE — PROGRESS NOTES
Pt temp elevated 101 7 tylenol given as ordered oncall hospitalist made aware pt c/o mild abdominal pain 2 out of 10 toradol ordered and given as ordered pt with one episode of loose stool  hospitalist aware will continue to monitor

## 2018-07-02 NOTE — ASSESSMENT & PLAN NOTE
Complex 4 8 cm right lower quadrant collection containing gas, fluid as well as peripheral enhancement and a large rounded internal calcification thought to most likely represent an appendicolith in a periappendiceal abscess from a ruptured   appendicitis  [facility] improving  General Surgery Managing     Cont IV antibiotic

## 2018-07-02 NOTE — ASSESSMENT & PLAN NOTE
Right lower back  Secondary to Mild right hydroureteronephrosis likely secondary to the distal right ureter being enveloped by portions of the right lower quadrant abscess  General surgery managing    No UTI

## 2018-07-02 NOTE — PROGRESS NOTES
Progress Note - General Surgery   Charis Krueger 81 y o  female MRN: [id number]  Unit/Bed#: -Antonio Encounter: [id number]    Assessment:  1  Charis Krueger is a 81 y o  female with perforated appendicitis with abscess, not amenable to IR drainage   -WBC 15 14 ->14 53  -T99 1, Tmax 100 7  -Abdominal pain improved  Passing flatus, had one small bowel movement this morning  Tolerating clears    Plan:  1  Continue IV zosyn for medical management of perforated appendicitis with abscess as WBC has continued trending down and abdominal pain improved  Had one febrile episode last night, currently afebrile  If leukocytosis fails to resolve, pain worsens, or patient has worsening fevers, will need to consider surgical intervention  2  Serial abdominal exams  3  Continue clear liquid diet  4  Pain control PRN  Will discontinue IV morphine as patient has not needed it since 6/30  Will order PRN PO medication  5  Encourage ambulation/OOB  6  DVT prophylaxis    Subjective/Objective     Subjective: Patient doing well sitting in chair in no acute distress  States her abdominal pain has improved greatly, only having some occasional pinpoint discomfort that does not require pain medication  Denied any sensation of fevers or chills overnight  Vitals showed patient was febrile with Tmax of 100 7 overnight  Currently afebrile  Tolerated clear liquid diet  Passing flatus and had one small bowel movement this morning  Able to ambulate  No changes with urination  No other complaints  Objective:     Blood pressure (!) 172/78, pulse 84, temperature 99 1 °F (37 3 °C), temperature source Oral, resp  rate 20, height 5' 2" (1 575 m), weight 75 5 kg (166 lb 7 2 oz), SpO2 100 %  ,Body mass index is 30 44 kg/m²        Intake/Output Summary (Last 24 hours) at 07/02/18 0811  Last data filed at 07/01/18 2032   Gross per 24 hour   Intake             1050 ml   Output              300 ml   Net              750 ml       Invasive Devices Peripheral Intravenous Line            Peripheral IV 07/01/18 Right Wrist 1 day                Physical Exam: BP (!) 172/78 (BP Location: Left arm)   Pulse 84   Temp 99 1 °F (37 3 °C) (Oral)   Resp 20   Ht 5' 2" (1 575 m)   Wt 75 5 kg (166 lb 7 2 oz)   SpO2 100%   BMI 30 44 kg/m²   General appearance: alert and oriented, in no acute distress  Lungs: clear to auscultation bilaterally  Heart: regular rate and rhythm, S1, S2 normal, no murmur, click, rub or gallop  Abdomen: soft, non-distended, non-tender to palpation diffusely, no guarding or rebound, active bowel sounds  Extremities: extremities normal, warm and well-perfused; no cyanosis, clubbing, or edema    Lab, Imaging and other studies:I have personally reviewed pertinent lab results       VTE Pharmacologic Prophylaxis: Enoxaparin (Lovenox)  VTE Mechanical Prophylaxis: sequential compression device    Recent Results (from the past 36 hour(s))   UA w Reflex to Microscopic w Reflex to Culture    Collection Time: 06/30/18  8:49 PM   Result Value Ref Range    Color, UA Yellow     Clarity, UA Clear     Specific [place], UA 1 015 1 003 - 1 030    pH, UA 8 0 4 5 - 8 0    Leukocytes, UA Small (A) Negative    Nitrite, UA Negative Negative    Protein, UA Negative Negative mg/dl    Glucose, UA Negative Negative mg/dl    Ketones, UA Negative Negative mg/dl    Urobilinogen, UA 0 2 0 2, 1 0 E U /dl E U /dl    Bilirubin, UA Negative Negative    Blood, UA Negative Negative   Urine Microscopic    Collection Time: 06/30/18  8:49 PM   Result Value Ref Range    RBC, UA None Seen None Seen, 0-5 /hpf    WBC, UA 10-20 (A) None Seen, 0-5, 5-55, 5-65 /hpf    Epithelial Cells Occasional None Seen, Occasional /hpf    Bacteria, UA Moderate (A) None Seen, Occasional /hpf   Fingerstick Glucose (POCT)    Collection Time: 06/30/18 11:55 PM   Result Value Ref Range    POC Glucose 110 65 - 140 mg/dl   Lipase    Collection Time: 07/01/18  1:31 AM   Result Value Ref Range    Lipase 70 (L) 73 - 393 u/L   Lactic acid -Repeat in 4 hours if first result greater than 4 0    Collection Time: 07/01/18  1:31 AM   Result Value Ref Range    LACTIC ACID 0 7 0 5 - 2 0 mmol/L   TSH, 3rd generation    Collection Time: 07/01/18  1:31 AM   Result Value Ref Range    TSH 3RD GENERATON 0 132 (L) 0 358 - 3 740 uIU/mL   ECG 12 lead    Collection Time: 07/01/18  5:23 AM   Result Value Ref Range    Ventricular Rate 77 BPM    Atrial Rate 77 BPM    OR Interval 170 ms    QRSD Interval 90 ms    QT Interval 370 ms    QTC Interval 418 ms    P Axis 61 degrees    QRS Axis -13 degrees    T Wave Axis 39 degrees   CBC and differential    Collection Time: 07/01/18  5:28 AM   Result Value Ref Range    WBC 15 14 (H) 4 31 - 10 16 Thousand/uL    RBC 4 59 3 81 - 5 12 Million/uL    Hemoglobin 11 8 11 5 - 15 4 g/dL    Hematocrit 36 4 34 8 - 46 1 %    MCV 79 (L) 82 - 98 fL    MCH 25 7 (L) 26 8 - 34 3 pg    MCHC 32 4 31 4 - 37 4 g/dL    RDW 14 5 11 6 - 15 1 %    MPV 8 7 (L) 8 9 - 12 7 fL    Platelets 082 277 - 554 Thousands/uL    nRBC 0 /100 WBCs    Neutrophils Relative 74 43 - 75 %    Immat GRANS % 1 0 - 2 %    Lymphocytes Relative 14 14 - 44 %    Monocytes Relative 10 4 - 12 %    Eosinophils Relative 1 0 - 6 %    Basophils Relative 0 0 - 1 %    Neutrophils Absolute 11 41 (H) 1 85 - 7 62 Thousands/µL    Immature Grans Absolute 0 09 0 00 - 0 20 Thousand/uL    Lymphocytes Absolute 2 07 0 60 - 4 47 Thousands/µL    Monocytes Absolute 1 46 (H) 0 17 - 1 22 Thousand/µL    Eosinophils Absolute 0 08 0 00 - 0 61 Thousand/µL    Basophils Absolute 0 03 0 00 - 0 10 Thousands/µL   Basic metabolic panel    Collection Time: 07/01/18  5:28 AM   Result Value Ref Range    Sodium 142 136 - 145 mmol/L    Potassium 4 0 3 5 - 5 3 mmol/L    Chloride 104 100 - 108 mmol/L    CO2 30 21 - 32 mmol/L    Anion Gap 8 4 - 13 mmol/L    BUN 11 5 - 25 mg/dL    Creatinine 0 73 0 60 - 1 30 mg/dL    Glucose 104 65 - 140 mg/dL    Calcium 8 8 8 3 - 10 1 mg/dL    eGFR 89 ml/min/1 73sq m   Magnesium    Collection Time: 07/01/18  5:28 AM   Result Value Ref Range    Magnesium 1 9 1 6 - 2 6 mg/dL   Phosphorus    Collection Time: 07/01/18  5:28 AM   Result Value Ref Range    Phosphorus 2 7 2 3 - 4 1 mg/dL   Fingerstick Glucose (POCT)    Collection Time: 07/01/18  5:42 AM   Result Value Ref Range    POC Glucose 108 65 - 140 mg/dl   Fingerstick Glucose (POCT)    Collection Time: 07/01/18 12:19 PM   Result Value Ref Range    POC Glucose 104 65 - 140 mg/dl   Basic metabolic panel    Collection Time: 07/02/18  5:06 AM   Result Value Ref Range    Sodium 139 136 - 145 mmol/L    Potassium 3 6 3 5 - 5 3 mmol/L    Chloride 103 100 - 108 mmol/L    CO2 31 21 - 32 mmol/L    Anion Gap 5 4 - 13 mmol/L    BUN 6 5 - 25 mg/dL    Creatinine 0 70 0 60 - 1 30 mg/dL    Glucose 138 65 - 140 mg/dL    Calcium 9 0 8 3 - 10 1 mg/dL    eGFR 93 ml/min/1 73sq m   Calcium, ionized    Collection Time: 07/02/18  5:06 AM   Result Value Ref Range    Calcium, Ionized 1 13 1 12 - 1 32 mmol/L   Magnesium    Collection Time: 07/02/18  5:06 AM   Result Value Ref Range    Magnesium 2 2 1 6 - 2 6 mg/dL   CBC and differential    Collection Time: 07/02/18  5:06 AM   Result Value Ref Range    WBC 14 53 (H) 4 31 - 10 16 Thousand/uL    RBC 4 55 3 81 - 5 12 Million/uL    Hemoglobin 11 7 11 5 - 15 4 g/dL    Hematocrit 35 5 34 8 - 46 1 %    MCV 78 (L) 82 - 98 fL    MCH 25 7 (L) 26 8 - 34 3 pg    MCHC 33 0 31 4 - 37 4 g/dL    RDW 14 5 11 6 - 15 1 %    MPV 8 5 (L) 8 9 - 12 7 fL    Platelets 451 872 - 677 Thousands/uL    nRBC 0 /100 WBCs    Neutrophils Relative 72 43 - 75 %    Immat GRANS % 1 0 - 2 %    Lymphocytes Relative 15 14 - 44 %    Monocytes Relative 11 4 - 12 %    Eosinophils Relative 1 0 - 6 %    Basophils Relative 0 0 - 1 %    Neutrophils Absolute 10 49 (H) 1 85 - 7 62 Thousands/µL    Immature Grans Absolute 0 10 0 00 - 0 20 Thousand/uL    Lymphocytes Absolute 2 15 0 60 - 4 47 Thousands/µL    Monocytes Absolute 1 65 (H) 0 17 - 1 22 Thousand/µL    Eosinophils Absolute 0 10 0 00 - 0 61 Thousand/µL    Basophils Absolute 0 04 0 00 - 0 10 Thousands/µL

## 2018-07-02 NOTE — PROGRESS NOTES
IR consult cancelled at this time, please consult IR again if anything in this patient's plan of care changes  Thank you

## 2018-07-03 LAB
ANION GAP SERPL CALCULATED.3IONS-SCNC: 6 MMOL/L (ref 4–13)
BASOPHILS # BLD AUTO: 0.03 THOUSANDS/ΜL (ref 0–0.1)
BASOPHILS NFR BLD AUTO: 0 % (ref 0–1)
BUN SERPL-MCNC: 6 MG/DL (ref 5–25)
CA-I BLD-SCNC: 1.12 MMOL/L (ref 1.12–1.32)
CALCIUM SERPL-MCNC: 8.8 MG/DL (ref 8.3–10.1)
CHLORIDE SERPL-SCNC: 105 MMOL/L (ref 100–108)
CO2 SERPL-SCNC: 29 MMOL/L (ref 21–32)
CREAT SERPL-MCNC: 0.76 MG/DL (ref 0.6–1.3)
EOSINOPHIL # BLD AUTO: 0.2 THOUSAND/ΜL (ref 0–0.61)
EOSINOPHIL NFR BLD AUTO: 1 % (ref 0–6)
ERYTHROCYTE [DISTWIDTH] IN BLOOD BY AUTOMATED COUNT: 14.1 % (ref 11.6–15.1)
GFR SERPL CREATININE-BSD FRML MDRD: 84 ML/MIN/1.73SQ M
GLUCOSE SERPL-MCNC: 128 MG/DL (ref 65–140)
GLUCOSE SERPL-MCNC: 132 MG/DL (ref 65–140)
GLUCOSE SERPL-MCNC: 143 MG/DL (ref 65–140)
GLUCOSE SERPL-MCNC: 179 MG/DL (ref 65–140)
GLUCOSE SERPL-MCNC: 238 MG/DL (ref 65–140)
HCT VFR BLD AUTO: 34.2 % (ref 34.8–46.1)
HGB BLD-MCNC: 11.3 G/DL (ref 11.5–15.4)
IMM GRANULOCYTES # BLD AUTO: 0.12 THOUSAND/UL (ref 0–0.2)
IMM GRANULOCYTES NFR BLD AUTO: 1 % (ref 0–2)
LYMPHOCYTES # BLD AUTO: 2.03 THOUSANDS/ΜL (ref 0.6–4.47)
LYMPHOCYTES NFR BLD AUTO: 14 % (ref 14–44)
MAGNESIUM SERPL-MCNC: 2.2 MG/DL (ref 1.6–2.6)
MCH RBC QN AUTO: 25.7 PG (ref 26.8–34.3)
MCHC RBC AUTO-ENTMCNC: 33 G/DL (ref 31.4–37.4)
MCV RBC AUTO: 78 FL (ref 82–98)
MONOCYTES # BLD AUTO: 1.5 THOUSAND/ΜL (ref 0.17–1.22)
MONOCYTES NFR BLD AUTO: 11 % (ref 4–12)
NEUTROPHILS # BLD AUTO: 10.39 THOUSANDS/ΜL (ref 1.85–7.62)
NEUTS SEG NFR BLD AUTO: 73 % (ref 43–75)
NRBC BLD AUTO-RTO: 0 /100 WBCS
PLATELET # BLD AUTO: 271 THOUSANDS/UL (ref 149–390)
PMV BLD AUTO: 8.5 FL (ref 8.9–12.7)
POTASSIUM SERPL-SCNC: 3.3 MMOL/L (ref 3.5–5.3)
RBC # BLD AUTO: 4.39 MILLION/UL (ref 3.81–5.12)
SODIUM SERPL-SCNC: 140 MMOL/L (ref 136–145)
WBC # BLD AUTO: 14.27 THOUSAND/UL (ref 4.31–10.16)

## 2018-07-03 PROCEDURE — 82948 REAGENT STRIP/BLOOD GLUCOSE: CPT

## 2018-07-03 PROCEDURE — 83735 ASSAY OF MAGNESIUM: CPT | Performed by: SURGERY

## 2018-07-03 PROCEDURE — 85025 COMPLETE CBC W/AUTO DIFF WBC: CPT | Performed by: SURGERY

## 2018-07-03 PROCEDURE — 99232 SBSQ HOSP IP/OBS MODERATE 35: CPT | Performed by: PHYSICIAN ASSISTANT

## 2018-07-03 PROCEDURE — 82330 ASSAY OF CALCIUM: CPT | Performed by: SURGERY

## 2018-07-03 PROCEDURE — 99231 SBSQ HOSP IP/OBS SF/LOW 25: CPT | Performed by: INTERNAL MEDICINE

## 2018-07-03 PROCEDURE — 80048 BASIC METABOLIC PNL TOTAL CA: CPT | Performed by: SURGERY

## 2018-07-03 RX ORDER — POTASSIUM CHLORIDE 20 MEQ/1
40 TABLET, EXTENDED RELEASE ORAL ONCE
Status: COMPLETED | OUTPATIENT
Start: 2018-07-03 | End: 2018-07-03

## 2018-07-03 RX ORDER — ACETAMINOPHEN 325 MG/1
650 TABLET ORAL EVERY 6 HOURS PRN
Status: DISCONTINUED | OUTPATIENT
Start: 2018-07-03 | End: 2018-07-08 | Stop reason: HOSPADM

## 2018-07-03 RX ADMIN — PIPERACILLIN SODIUM,TAZOBACTAM SODIUM 3.38 G: 3; .375 INJECTION, POWDER, FOR SOLUTION INTRAVENOUS at 22:19

## 2018-07-03 RX ADMIN — LISINOPRIL 40 MG: 20 TABLET ORAL at 08:58

## 2018-07-03 RX ADMIN — METOPROLOL SUCCINATE 100 MG: 100 TABLET, EXTENDED RELEASE ORAL at 08:59

## 2018-07-03 RX ADMIN — AMLODIPINE BESYLATE 5 MG: 5 TABLET ORAL at 08:59

## 2018-07-03 RX ADMIN — INSULIN LISPRO 1 UNITS: 100 INJECTION, SOLUTION INTRAVENOUS; SUBCUTANEOUS at 12:04

## 2018-07-03 RX ADMIN — ACETAMINOPHEN 650 MG: 325 TABLET, FILM COATED ORAL at 15:53

## 2018-07-03 RX ADMIN — ENOXAPARIN SODIUM 40 MG: 40 INJECTION SUBCUTANEOUS at 09:00

## 2018-07-03 RX ADMIN — HYDROCHLOROTHIAZIDE 12.5 MG: 12.5 TABLET ORAL at 08:59

## 2018-07-03 RX ADMIN — PRAVASTATIN SODIUM 20 MG: 20 TABLET ORAL at 20:37

## 2018-07-03 RX ADMIN — DEXTROSE AND SODIUM CHLORIDE 100 ML/HR: 5; .45 INJECTION, SOLUTION INTRAVENOUS at 10:12

## 2018-07-03 RX ADMIN — PIPERACILLIN SODIUM,TAZOBACTAM SODIUM 3.38 G: 3; .375 INJECTION, POWDER, FOR SOLUTION INTRAVENOUS at 05:41

## 2018-07-03 RX ADMIN — PIPERACILLIN SODIUM,TAZOBACTAM SODIUM 3.38 G: 3; .375 INJECTION, POWDER, FOR SOLUTION INTRAVENOUS at 10:12

## 2018-07-03 RX ADMIN — PIPERACILLIN SODIUM,TAZOBACTAM SODIUM 3.38 G: 3; .375 INJECTION, POWDER, FOR SOLUTION INTRAVENOUS at 16:07

## 2018-07-03 RX ADMIN — POTASSIUM CHLORIDE 40 MEQ: 1500 TABLET, EXTENDED RELEASE ORAL at 16:12

## 2018-07-03 NOTE — PROGRESS NOTES
Pt presently in bathroom and refuses to have undersign stay and assist her  Reminded her of her risk of falling  Pt teaching done, Pt verbalized understanding  But still refused for undersign to assist in bathroom

## 2018-07-03 NOTE — PROGRESS NOTES
Approx 1230 Dr Sandra Pineda made aware pt's Ka 3 3 and AC but no HS POC ordered  NNO received as of yet  No acute distress noted

## 2018-07-03 NOTE — PROGRESS NOTES
Progress Note - General Surgery   Shaun Hall 81 y o  female MRN: [id number]  Unit/Bed#: -Antonio Encounter: [id number]    Assessment/Plan  Perforated Appendix with abscess, symptomatic x 5 days prior to ED, unable to access from IR  Pt is eating and does not have abd pain  She is having bowel movements    -cont medical management   -d/c IVF  -cont antibiotics Zosyn  -cont to monitor exam  -consider repeat CT in 3 days  Chief Complaint: I am feeling better, I do not have pain  I had bowel movement, and I am eating ok      Objective Directed Exam:  abd soft, nontender, NBS, WBC cont at 14,     Blood pressure 146/80, pulse 81, temperature 98 6 °F (37 °C), temperature source Oral, resp  rate 18, height 5' 2" (1 575 m), weight 75 5 kg (166 lb 7 2 oz), SpO2 99 %  ,Body mass index is 30 44 kg/m²        Intake/Output Summary (Last 24 hours) at 07/03/18 1234  Last data filed at 07/03/18 1229   Gross per 24 hour   Intake              720 ml   Output              850 ml   Net             -130 ml       Invasive Devices     Peripheral Intravenous Line            Peripheral IV 07/02/18 Right Forearm 1 day                Physical Exam:   General Appearance:    Alert and orientated x 3, cooperative, no distress   Lungs:     Clear to auscultation bilaterally, respirations unlabored    Heart:    Regular rate and rhythm   Abdomen:     As above     Wound/Dressing:  C/d/i,       Extremities:   Extremities normal,  no cyanosis or edema   Pulses:   2+ and symmetric all extremities, no calf tenderness   Skin:   Skin color, texture, turgor normal, no rashes or lesions   Neurologic:   CNII-XII intact, normal strength, sensation and reflexes     Throughout, affect appropriate                           Labs:   CBC with diff: Lab Results   Component Value Date    WBC 14 27 (H) 07/03/2018    HGB 11 3 (L) 07/03/2018    HCT 34 2 (L) 07/03/2018    MCV 78 (L) 07/03/2018     07/03/2018    MCH 25 7 (L) 07/03/2018    MCHC 33 0 07/03/2018    RDW 14 1 07/03/2018    MPV 8 5 (L) 07/03/2018    NRBC 0 07/03/2018   ,   BMP/CMP:  Lab Results   Component Value Date     07/03/2018    K 3 3 (L) 07/03/2018     07/03/2018    CO2 29 07/03/2018    ANIONGAP 6 07/03/2018    BUN 6 07/03/2018    CREATININE 0 76 07/03/2018    GLUCOSE 143 (H) 07/03/2018    CALCIUM 8 8 07/03/2018    AST 31 06/30/2018    ALT 28 06/30/2018    ALKPHOS 69 06/30/2018    PROT 7 9 06/30/2018    BILITOT 0 40 06/30/2018    EGFR 84 07/03/2018   ,   Lipid Panel:   Lab Results   Component Value Date    CHOL 110 06/05/2018   ,   Coags: No results found for: PT, PTT, INR,     Blood Culture:   Lab Results   Component Value Date    BLOODCX No Growth at 24 hrs  07/01/2018   ,   Urinalysis: Lab Results   Component Value Date    COLORU Yellow 06/30/2018    CLARITYU Clear 06/30/2018    SPECGRAV 1 015 06/30/2018    PHUR 8 0 06/30/2018    LEUKOCYTESUR Small (A) 06/30/2018    NITRITE Negative 06/30/2018    PROTEINUA Negative 06/30/2018    GLUCOSEU Negative 06/30/2018    KETONESU Negative 06/30/2018    BILIRUBINUR Negative 06/30/2018    BLOODU Negative 06/30/2018   ,   Urine Culture:   Lab Results   Component Value Date    URINECX <10,000 cfu/ml Gram Negative Jas (A) 06/30/2018   ,   Wound Culure: No results found for: WOUNDCULT      Imaging: Ct Abdomen Pelvis With Contrast    Result Date: 6/30/2018  Impression: 1  Complex 4 8 cm right lower quadrant collection containing gas, fluid as well as peripheral enhancement and a large rounded internal calcification thought to most likely represent an appendicolith in a periappendiceal abscess from a ruptured appendicitis  Less likely differential diagnosis includes perforated malignancy and associated abscess  Surgical evaluation recommended  2   Lobulated spleen with numerous small splenic mass lesions, possibly hemangiomas  Other splenic lesions not excluded    Consider follow-up surveillance ultrasound in 3 months for further characterization and surveillance purposes  Considerations related to the patient's age and/or comorbidities may be used to alter these recommendations  3   Mild right hydroureteronephrosis likely secondary to the distal right ureter being enveloped by portions of the right lower quadrant abscess  4   Diverticulosis 5  Enlarged myomatous uterus with large calcified fibroid    I personally discussed this study with Rafiq Lan on 6/30/2018 at 8:40 PM  Workstation performed: [id number]         Litzy Davey PA-C  7/3/2018

## 2018-07-04 ENCOUNTER — APPOINTMENT (INPATIENT)
Dept: CT IMAGING | Facility: HOSPITAL | Age: 83
DRG: 853 | End: 2018-07-04
Payer: MEDICARE

## 2018-07-04 PROBLEM — E87.6 HYPOKALEMIA: Status: ACTIVE | Noted: 2018-07-04

## 2018-07-04 LAB
ANION GAP SERPL CALCULATED.3IONS-SCNC: 9 MMOL/L (ref 4–13)
BASOPHILS # BLD MANUAL: 0 THOUSAND/UL (ref 0–0.1)
BASOPHILS NFR MAR MANUAL: 0 % (ref 0–1)
BUN SERPL-MCNC: 4 MG/DL (ref 5–25)
CA-I BLD-SCNC: 1.12 MMOL/L (ref 1.12–1.32)
CALCIUM SERPL-MCNC: 8.8 MG/DL (ref 8.3–10.1)
CHLORIDE SERPL-SCNC: 106 MMOL/L (ref 100–108)
CO2 SERPL-SCNC: 28 MMOL/L (ref 21–32)
CREAT SERPL-MCNC: 0.76 MG/DL (ref 0.6–1.3)
EOSINOPHIL # BLD MANUAL: 0.16 THOUSAND/UL (ref 0–0.4)
EOSINOPHIL NFR BLD MANUAL: 1 % (ref 0–6)
ERYTHROCYTE [DISTWIDTH] IN BLOOD BY AUTOMATED COUNT: 14.1 % (ref 11.6–15.1)
GFR SERPL CREATININE-BSD FRML MDRD: 84 ML/MIN/1.73SQ M
GLUCOSE SERPL-MCNC: 102 MG/DL (ref 65–140)
GLUCOSE SERPL-MCNC: 109 MG/DL (ref 65–140)
GLUCOSE SERPL-MCNC: 114 MG/DL (ref 65–140)
GLUCOSE SERPL-MCNC: 167 MG/DL (ref 65–140)
GLUCOSE SERPL-MCNC: 196 MG/DL (ref 65–140)
HCT VFR BLD AUTO: 34 % (ref 34.8–46.1)
HGB BLD-MCNC: 11.2 G/DL (ref 11.5–15.4)
LYMPHOCYTES # BLD AUTO: 19 % (ref 14–44)
LYMPHOCYTES # BLD AUTO: 2.98 THOUSAND/UL (ref 0.6–4.47)
MAGNESIUM SERPL-MCNC: 2 MG/DL (ref 1.6–2.6)
MCH RBC QN AUTO: 25.6 PG (ref 26.8–34.3)
MCHC RBC AUTO-ENTMCNC: 32.9 G/DL (ref 31.4–37.4)
MCV RBC AUTO: 78 FL (ref 82–98)
MONOCYTES # BLD AUTO: 1.41 THOUSAND/UL (ref 0–1.22)
MONOCYTES NFR BLD: 9 % (ref 4–12)
NEUTROPHILS # BLD MANUAL: 11.14 THOUSAND/UL (ref 1.85–7.62)
NEUTS HYPERSEG BLD QL SMEAR: PRESENT
NEUTS SEG NFR BLD AUTO: 71 % (ref 43–75)
NRBC BLD AUTO-RTO: 0 /100 WBCS
PLATELET # BLD AUTO: 294 THOUSANDS/UL (ref 149–390)
PLATELET BLD QL SMEAR: ADEQUATE
PMV BLD AUTO: 8.9 FL (ref 8.9–12.7)
POTASSIUM SERPL-SCNC: 3.6 MMOL/L (ref 3.5–5.3)
RBC # BLD AUTO: 4.38 MILLION/UL (ref 3.81–5.12)
SODIUM SERPL-SCNC: 143 MMOL/L (ref 136–145)
TOTAL CELLS COUNTED SPEC: 100
WBC # BLD AUTO: 15.69 THOUSAND/UL (ref 4.31–10.16)

## 2018-07-04 PROCEDURE — 82330 ASSAY OF CALCIUM: CPT | Performed by: SURGERY

## 2018-07-04 PROCEDURE — 85027 COMPLETE CBC AUTOMATED: CPT | Performed by: SURGERY

## 2018-07-04 PROCEDURE — 82948 REAGENT STRIP/BLOOD GLUCOSE: CPT

## 2018-07-04 PROCEDURE — 80048 BASIC METABOLIC PNL TOTAL CA: CPT | Performed by: SURGERY

## 2018-07-04 PROCEDURE — 99231 SBSQ HOSP IP/OBS SF/LOW 25: CPT | Performed by: INTERNAL MEDICINE

## 2018-07-04 PROCEDURE — 99232 SBSQ HOSP IP/OBS MODERATE 35: CPT | Performed by: SURGERY

## 2018-07-04 PROCEDURE — 85007 BL SMEAR W/DIFF WBC COUNT: CPT | Performed by: SURGERY

## 2018-07-04 PROCEDURE — 83735 ASSAY OF MAGNESIUM: CPT | Performed by: SURGERY

## 2018-07-04 RX ADMIN — AMLODIPINE BESYLATE 5 MG: 5 TABLET ORAL at 08:14

## 2018-07-04 RX ADMIN — METOPROLOL SUCCINATE 100 MG: 100 TABLET, EXTENDED RELEASE ORAL at 08:14

## 2018-07-04 RX ADMIN — PIPERACILLIN SODIUM,TAZOBACTAM SODIUM 3.38 G: 3; .375 INJECTION, POWDER, FOR SOLUTION INTRAVENOUS at 05:01

## 2018-07-04 RX ADMIN — ENOXAPARIN SODIUM 40 MG: 40 INJECTION SUBCUTANEOUS at 08:14

## 2018-07-04 RX ADMIN — PRAVASTATIN SODIUM 20 MG: 20 TABLET ORAL at 21:23

## 2018-07-04 RX ADMIN — PIPERACILLIN SODIUM,TAZOBACTAM SODIUM 3.38 G: 3; .375 INJECTION, POWDER, FOR SOLUTION INTRAVENOUS at 18:02

## 2018-07-04 RX ADMIN — HYDROCHLOROTHIAZIDE 12.5 MG: 12.5 TABLET ORAL at 08:14

## 2018-07-04 RX ADMIN — LISINOPRIL 40 MG: 20 TABLET ORAL at 08:17

## 2018-07-04 RX ADMIN — PIPERACILLIN SODIUM,TAZOBACTAM SODIUM 3.38 G: 3; .375 INJECTION, POWDER, FOR SOLUTION INTRAVENOUS at 11:37

## 2018-07-04 NOTE — PROGRESS NOTES
Progress Note - Cynthia Oliveros 1935, 81 y o  female MRN: [id number]    Unit/Bed#: -01 Encounter: [id number]    Primary Care Provider: Rigo Mark MD   Date and time admitted to hospital: 6/30/2018  6:30 PM        * Perforated appendicitis   Assessment & Plan    Complex 4 8 cm right lower quadrant collection containing gas, fluid as well as peripheral enhancement and a large rounded internal calcification thought to most likely represent an appendicolith in a periappendiceal abscess from a ruptured   appendicitis  [facility] improving  General Surgery Managing  Cont IV antibiotic        Periumbilical abdominal pain   Assessment & Plan    Complex 4 8 cm right lower quadrant collection containing gas, fluid as well as peripheral enhancement and a large rounded internal calcification thought to most likely represent an appendicolith in a periappendiceal abscess from a ruptured   appendicitis   Less likely differential diagnosis includes perforated malignancy and associated abscess  General Surgery Manging        Leukocytosis   Assessment & Plan    In the setting of acute appendicitis with her  No IR Candidate for draining  Patient does not meet SIRS criteria  Manage by General surgery  Monitor WBC and temp  Blood cultures  Continue Zosyn IV  Type 2 diabetes mellitus without complication, without long-term current use of insulin [facility])   Assessment & Plan    Lab Results   Component Value Date    HGBA1C 6 2 06/05/2018       Recent Labs      07/03/18   0611  07/03/18   1133  07/03/18   1614  07/03/18   2107   POCGLU  128  179*  132  238*     Hold Metformin  SSI, monitor BS Q 6 hours  Low Carb Diet  Blood Sugar Average: Last 72 hrs:  (P) 151 [id number]   Cont SSI        Mixed hyperlipidemia   Assessment & Plan    Cont Statin        Essential hypertension   Assessment & Plan    stable  Continue home regimen of metoprolol, lisinopril, Norvasc and hydrochlorothiazide    Will cover with p r n  hydralazine at this time  Continue monitor          Back pain   Assessment & Plan    Right lower back  Secondary to Mild right hydroureteronephrosis likely secondary to the distal right ureter being enveloped by portions of the right lower quadrant abscess  General surgery managing  No UTI        Hypokalemia   Assessment & Plan    Discussed with Suregery Team potassium added orally  Serial labs            VTE Pharmacologic Prophylaxis:   Pharmacologic: Enoxaparin (Lovenox)  Mechanical: Mechanical VTE prophylaxis in place  Patient Centered Rounds: walking rounds with nursing  Discussions with Specialists or Other Care Team Provider: Surgery  Education and Discussions with Family / Patient: none  Time Spent for Care: 20 minutes  If More than 50% of total time spent on counseling and coordination of care as described above indicate yes or no and described the counseling and coordination:no    Current Length of Stay: day(s)  Current Patient Status: Inpatient   Certification Statement: The patient will continue to require additional inpatient hospital stay due to fever post op obs, abx iv    Discharge Plan: tbd  Code Status: Level 1 - Full Code    Subjective:   Patient relates less pain in the abdomen, tolerating some food  Objective:   Vitals:   Temp (24hrs), Av 1 °F (37 8 °C), Min:98 6 °F (37 °C), Max:102 8 °F (39 3 °C)    HR:  [75-95] 88  Resp:  [18-20] 20  BP: (140-178)/(60-81) 162/70  SpO2:  [97 %-99 %] 98 %  Body mass index is 30 44 kg/m²  Input and Output Summary (last 24 hours): Intake/Output Summary (Last 24 hours) at 18 0017  Last data filed at 18 1900   Gross per 24 hour   Intake             1170 ml   Output              800 ml   Net              370 ml       Physical Exam:     Physical Exam   Constitutional: She is oriented to person, place, and time  She appears well-developed and well-nourished  No distress  HENT:   Head: Normocephalic and atraumatic     Right Ear: External ear normal    Left Ear: External ear normal    Nose: Nose normal    Mouth/Throat: Oropharynx is clear and moist  No oropharyngeal exudate  Eyes: Conjunctivae and EOM are normal  Pupils are equal, round, and reactive to light  Right eye exhibits no discharge  Left eye exhibits no discharge  No scleral icterus  Neck: Normal range of motion  Neck supple  No JVD present  No thyromegaly present  Cardiovascular: Normal rate, regular rhythm, normal heart sounds and intact distal pulses  Exam reveals no gallop and no friction rub  No murmur heard  Pulmonary/Chest: Effort normal and breath sounds normal  No respiratory distress  She has no wheezes  She has no rales  Abdominal: Soft  Bowel sounds are normal  She exhibits no distension  There is no tenderness  There is no rebound and no guarding  Musculoskeletal: Normal range of motion  She exhibits no edema or deformity  Lymphadenopathy:     She has no cervical adenopathy  Neurological: She is alert and oriented to person, place, and time  She has normal reflexes  No cranial nerve deficit  She exhibits normal muscle tone  Skin: Skin is warm and dry  No rash noted  She is not diaphoretic  No erythema  Psychiatric: She has a normal mood and affect  Vitals reviewed        Additional Data:   Labs:    Results from last 7 days  Lab Units 07/03/18  0540   WBC Thousand/uL 14 27*   HEMOGLOBIN g/dL 11 3*   HEMATOCRIT % 34 2*   PLATELETS Thousands/uL 271   NEUTROS PCT % 73   LYMPHS PCT % 14   MONOS PCT % 11   EOS PCT % 1       Results from last 7 days  Lab Units 07/03/18  0540  06/30/18  1917   SODIUM mmol/L 140  < > 137   POTASSIUM mmol/L 3 3*  < > 4 4   CHLORIDE mmol/L 105  < > 100   CO2 mmol/L 29  < > 34*   BUN mg/dL 6  < > 18   CREATININE mg/dL 0 76  < > 0 77   CALCIUM mg/dL 8 8  < > 9 4   TOTAL PROTEIN g/dL  --   --  7 9   BILIRUBIN TOTAL mg/dL  --   --  0 40   ALK PHOS U/L  --   --  69   ALT U/L  --   --  28   AST U/L  --   --  31   GLUCOSE RANDOM mg/dL 143*  < > 112   < > = values in this interval not displayed  * I Have Reviewed All Lab Data Listed Above  * Additional Pertinent Lab Tests Reviewed: All Labs Within Last 24 Hours Reviewed    Imaging:    Imaging Reports Reviewed Today Include: none    Cultures:   Blood Culture:   Lab Results   Component Value Date    BLOODCX No Growth at 48 hrs  07/01/2018    BLOODCX No Growth at 48 hrs  07/01/2018     Urine Culture:   Lab Results   Component Value Date    URINECX <10,000 cfu/ml Gram Negative Jas (A) 06/30/2018     Sputum Culture: No components found for: SPUTUMCX  Wound Culture: No results found for: WOUNDCULT    Last 24 Hours Medication List:     Current Facility-Administered Medications:  acetaminophen 650 mg Oral Q6H PRN Bryanna Edmonds PA-C    amLODIPine 5 mg Oral Daily DEYSI Winston    enoxaparin 40 mg Subcutaneous Daily Eddyase Roberta Mensah MD    hydrochlorothiazide 12 5 mg Oral Daily DEYSI Winston    insulin lispro 1-5 Units Subcutaneous TID AC Bryanna Edmonds PA-C    lisinopril 40 mg Oral Daily DEYSI Winston    metoprolol succinate 100 mg Oral Daily DEYSI Winston    ondansetron 4 mg Intravenous Q6H PRN Heber Triplett MD    oxyCODONE-acetaminophen 1 tablet Oral Q4H PRN Laurel Forkgonzalo Barillas PA-C    piperacillin-tazobactam 3 375 g Intravenous Q6H Heber Triplett MD Last Rate: 3 375 g (07/03/18 2219)   pravastatin 20 mg Oral Daily DEYSI Winston    sodium chloride 1,000 mL Intravenous Once Wandy Phillips DO         Today, Patient Was Seen By: Matt Lundborg, MD    ** Please Note: Dragon 360 Dictation voice to text software may have been used in the creation of this document   **

## 2018-07-04 NOTE — PROGRESS NOTES
Progress Note - General Surgery   Cynthia Arms 81 y o  female MRN: [id number]  Unit/Bed#: -01 Encounter: [id number]    Assessment:  Acute appendicitis with perforation and abscess, clinically stable  Hypertension  Diabetes    Plan:  The patient is still having spikes of temperature overnight, white cell count still elevated to 15,000  We will repeat CT scan of the abdomen and pelvis tomorrow, if the symptoms and white cell count do not improved then surgical intervention will be recommended  Subjective/Objective   Chief Complaint:  Denies any abdominal pain, nausea or vomiting    Subjective: The patient is passing flatus but no bowel movement  Denies any chest pain, shortness of breath, chills    Objective:     Blood pressure 152/68, pulse 73, temperature 98 9 °F (37 2 °C), temperature source Oral, resp  rate 18, height 5' 2" (1 575 m), weight 75 5 kg (166 lb 7 2 oz), SpO2 99 %  ,Body mass index is 30 44 kg/m²  Intake/Output Summary (Last 24 hours) at 07/04/18 1009  Last data filed at 07/03/18 1900   Gross per 24 hour   Intake             1170 ml   Output              800 ml   Net              370 ml       Invasive Devices     Peripheral Intravenous Line            Peripheral IV 07/02/18 Right Forearm 1 day                Physical Exam:  Abdomen is soft, nondistended and nontender      Lab, Imaging and other studies:  CBC:   Lab Results   Component Value Date    WBC 15 69 (H) 07/04/2018    HGB 11 2 (L) 07/04/2018    HCT 34 0 (L) 07/04/2018    MCV 78 (L) 07/04/2018     07/04/2018    MCH 25 6 (L) 07/04/2018    MCHC 32 9 07/04/2018    RDW 14 1 07/04/2018    MPV 8 9 07/04/2018    NRBC 0 07/04/2018   , CMP:   Lab Results   Component Value Date     07/04/2018    K 3 6 07/04/2018     07/04/2018    CO2 28 07/04/2018    ANIONGAP 9 07/04/2018    BUN 4 (L) 07/04/2018    CREATININE 0 76 07/04/2018    GLUCOSE 102 07/04/2018    CALCIUM 8 8 07/04/2018    EGFR 84 07/04/2018     VTE Pharmacologic Prophylaxis: Enoxaparin (Lovenox)  VTE Mechanical Prophylaxis: sequential compression device

## 2018-07-04 NOTE — ASSESSMENT & PLAN NOTE
Lab Results   Component Value Date    HGBA1C 6 2 06/05/2018       Recent Labs      07/03/18   0611  07/03/18   1133  07/03/18   1614  07/03/18   2107   POCGLU  128  179*  132  238*     Hold Metformin  SSI, monitor BS Q 6 hours  Low Carb Diet       Blood Sugar Average: Last 72 hrs:  (P) 151 [id number]   Cont SSI

## 2018-07-04 NOTE — ASSESSMENT & PLAN NOTE
Complex 4 8 cm right lower quadrant collection containing gas, fluid as well as peripheral enhancement and a large rounded internal calcification thought to most likely represent an appendicolith in a periappendiceal abscess from a ruptured   appendicitis  [facility] improving  General Surgery Managing     Cont IV antibiotic

## 2018-07-04 NOTE — ASSESSMENT & PLAN NOTE
In the setting of acute appendicitis with her  No IR Candidate for draining  Patient does not meet SIRS criteria  Manage by General surgery  Monitor WBC and temp  Blood cultures  Continue Zosyn IV

## 2018-07-05 ENCOUNTER — APPOINTMENT (INPATIENT)
Dept: CT IMAGING | Facility: HOSPITAL | Age: 83
DRG: 853 | End: 2018-07-05
Payer: MEDICARE

## 2018-07-05 LAB
BASOPHILS # BLD MANUAL: 0 THOUSAND/UL (ref 0–0.1)
BASOPHILS NFR MAR MANUAL: 0 % (ref 0–1)
EOSINOPHIL # BLD MANUAL: 0.11 THOUSAND/UL (ref 0–0.4)
EOSINOPHIL NFR BLD MANUAL: 1 % (ref 0–6)
ERYTHROCYTE [DISTWIDTH] IN BLOOD BY AUTOMATED COUNT: 14.2 % (ref 11.6–15.1)
GLUCOSE SERPL-MCNC: 119 MG/DL (ref 65–140)
GLUCOSE SERPL-MCNC: 142 MG/DL (ref 65–140)
GLUCOSE SERPL-MCNC: 148 MG/DL (ref 65–140)
GLUCOSE SERPL-MCNC: 185 MG/DL (ref 65–140)
HCT VFR BLD AUTO: 37.2 % (ref 34.8–46.1)
HGB BLD-MCNC: 12.2 G/DL (ref 11.5–15.4)
LYMPHOCYTES # BLD AUTO: 19 % (ref 14–44)
LYMPHOCYTES # BLD AUTO: 2.07 THOUSAND/UL (ref 0.6–4.47)
MCH RBC QN AUTO: 25.4 PG (ref 26.8–34.3)
MCHC RBC AUTO-ENTMCNC: 32.8 G/DL (ref 31.4–37.4)
MCV RBC AUTO: 78 FL (ref 82–98)
MONOCYTES # BLD AUTO: 0.65 THOUSAND/UL (ref 0–1.22)
MONOCYTES NFR BLD: 6 % (ref 4–12)
NEUTROPHILS # BLD MANUAL: 7.86 THOUSAND/UL (ref 1.85–7.62)
NEUTS BAND NFR BLD MANUAL: 2 % (ref 0–8)
NEUTS SEG NFR BLD AUTO: 70 % (ref 43–75)
NRBC BLD AUTO-RTO: 0 /100 WBCS
PLATELET # BLD AUTO: 344 THOUSANDS/UL (ref 149–390)
PLATELET BLD QL SMEAR: ADEQUATE
PMV BLD AUTO: 8.1 FL (ref 8.9–12.7)
RBC # BLD AUTO: 4.8 MILLION/UL (ref 3.81–5.12)
TOTAL CELLS COUNTED SPEC: 100
VARIANT LYMPHS # BLD AUTO: 2 %
WBC # BLD AUTO: 10.91 THOUSAND/UL (ref 4.31–10.16)

## 2018-07-05 PROCEDURE — 85027 COMPLETE CBC AUTOMATED: CPT | Performed by: SURGERY

## 2018-07-05 PROCEDURE — 85007 BL SMEAR W/DIFF WBC COUNT: CPT | Performed by: SURGERY

## 2018-07-05 PROCEDURE — 99231 SBSQ HOSP IP/OBS SF/LOW 25: CPT | Performed by: INTERNAL MEDICINE

## 2018-07-05 PROCEDURE — 74177 CT ABD & PELVIS W/CONTRAST: CPT

## 2018-07-05 PROCEDURE — 82948 REAGENT STRIP/BLOOD GLUCOSE: CPT

## 2018-07-05 PROCEDURE — 99232 SBSQ HOSP IP/OBS MODERATE 35: CPT | Performed by: PHYSICIAN ASSISTANT

## 2018-07-05 RX ADMIN — ACETAMINOPHEN 650 MG: 325 TABLET, FILM COATED ORAL at 23:50

## 2018-07-05 RX ADMIN — PIPERACILLIN SODIUM,TAZOBACTAM SODIUM 3.38 G: 3; .375 INJECTION, POWDER, FOR SOLUTION INTRAVENOUS at 05:59

## 2018-07-05 RX ADMIN — PIPERACILLIN SODIUM,TAZOBACTAM SODIUM 3.38 G: 3; .375 INJECTION, POWDER, FOR SOLUTION INTRAVENOUS at 00:00

## 2018-07-05 RX ADMIN — PRAVASTATIN SODIUM 20 MG: 20 TABLET ORAL at 21:12

## 2018-07-05 RX ADMIN — PIPERACILLIN SODIUM,TAZOBACTAM SODIUM 3.38 G: 3; .375 INJECTION, POWDER, FOR SOLUTION INTRAVENOUS at 23:45

## 2018-07-05 RX ADMIN — HYDROCHLOROTHIAZIDE 12.5 MG: 12.5 TABLET ORAL at 09:09

## 2018-07-05 RX ADMIN — ENOXAPARIN SODIUM 40 MG: 40 INJECTION SUBCUTANEOUS at 09:10

## 2018-07-05 RX ADMIN — PIPERACILLIN SODIUM,TAZOBACTAM SODIUM 3.38 G: 3; .375 INJECTION, POWDER, FOR SOLUTION INTRAVENOUS at 16:28

## 2018-07-05 RX ADMIN — LISINOPRIL 40 MG: 20 TABLET ORAL at 09:09

## 2018-07-05 RX ADMIN — METOPROLOL SUCCINATE 100 MG: 100 TABLET, EXTENDED RELEASE ORAL at 09:09

## 2018-07-05 RX ADMIN — AMLODIPINE BESYLATE 5 MG: 5 TABLET ORAL at 09:10

## 2018-07-05 RX ADMIN — IOHEXOL 100 ML: 350 INJECTION, SOLUTION INTRAVENOUS at 13:31

## 2018-07-05 RX ADMIN — PIPERACILLIN SODIUM,TAZOBACTAM SODIUM 3.38 G: 3; .375 INJECTION, POWDER, FOR SOLUTION INTRAVENOUS at 12:01

## 2018-07-05 RX ADMIN — IOHEXOL 50 ML: 240 INJECTION, SOLUTION INTRATHECAL; INTRAVASCULAR; INTRAVENOUS; ORAL at 13:34

## 2018-07-05 NOTE — ASSESSMENT & PLAN NOTE
Right lower back  Secondary to Mild right hydroureteronephrosis likely secondary to the distal right ureter being enveloped by portions of the right lower quadrant abscess  General surgery managing  Pain improved  CT scan to assess intra-abdominal process in a m  reassess hydro ureteral nephrosis at that time      No UTI

## 2018-07-05 NOTE — ASSESSMENT & PLAN NOTE
Complex 4 8 cm right lower quadrant collection containing gas, fluid as well as peripheral enhancement and a large rounded internal calcification thought to most likely represent an appendicolith in a periappendiceal abscess from a ruptured   appendicitis   Less likely differential diagnosis includes perforated malignancy and associated abscess  General Surgery Manging    Pain improved but white count fever persist

## 2018-07-05 NOTE — PROGRESS NOTES
Progress Note - Poonam Putnam 1935, 81 y o  female MRN: [id number]    Unit/Bed#: -Antonio Encounter: [id number]    Primary Care Provider: Keara Lewis MD   Date and time admitted to hospital: 6/30/2018  6:30 PM        * Perforated appendicitis   Assessment & Plan    Complex 4 8 cm right lower quadrant collection containing gas, fluid as well as peripheral enhancement and a large rounded internal calcification thought to most likely represent an appendicolith in a periappendiceal abscess from a ruptured   appendicitis  [facility] improving  General Surgery Managing  Cont IV antibiotic  Patient to obtain CT abdomen in a m  as she has persistent fever        Periumbilical abdominal pain   Assessment & Plan    Complex 4 8 cm right lower quadrant collection containing gas, fluid as well as peripheral enhancement and a large rounded internal calcification thought to most likely represent an appendicolith in a periappendiceal abscess from a ruptured   appendicitis   Less likely differential diagnosis includes perforated malignancy and associated abscess  General Surgery Manging  Pain improved but white count fever persist        Leukocytosis   Assessment & Plan    In the setting of acute appendicitis with her  No IR Candidate for draining  Patient does not meet SIRS criteria  Manage by General surgery  Monitor WBC and temp  Blood cultures  Continue Zosyn IV  Patient scheduled for CT in a m  possible further surgical intervention per surgical team          Type 2 diabetes mellitus without complication, without long-term current use of insulin [facility])   Assessment & Plan    Lab Results   Component Value Date    HGBA1C 6 2 06/05/2018       Recent Labs      07/04/18   0557  07/04/18   1113  07/04/18   1716  07/04/18   2116   POCGLU  109  196*  114  167*     Hold Metformin  SSI, monitor BS Q 6 hours  Low Carb Diet       Blood Sugar Average: Last 72 hrs:  (P) 465 [id number]   Cont SSI        Mixed hyperlipidemia   Assessment & Plan    Cont Statin        Essential hypertension   Assessment & Plan    stable  Continue home regimen of metoprolol, lisinopril, Norvasc and hydrochlorothiazide  Will cover with p r n  hydralazine at this time  Continue monitor          Back pain   Assessment & Plan    Right lower back  Secondary to Mild right hydroureteronephrosis likely secondary to the distal right ureter being enveloped by portions of the right lower quadrant abscess  General surgery managing  Pain improved  CT scan to assess intra-abdominal process in a m  reassess hydro ureteral nephrosis at that time  No UTI        Hypokalemia   Assessment & Plan    [facility]  Serial labs            VTE Pharmacologic Prophylaxis:   Pharmacologic: Enoxaparin (Lovenox)  Mechanical: Mechanical VTE prophylaxis in place  Patient Centered Rounds: Walking rounds with nursing  Discussions with Specialists or Other Care Team Provider:   Reviewed surgery noteOther Care Team Provider:  Education and Discussions with Family / Patient:   Daughters present and updatedwith Family / Patient:  Time Spent for Care: 25 min  If More than 50% of total time spent on counseling and coordination of care as described above indicate yes or no and described the counseling and coordination:  No    Current Length of Stay: 4 day(s)  Current Patient Status: Inpatient   Certification Statement: The patient will continue to require additional inpatient hospital stay due to Persistent white count and fever    Discharge Plan: To be determined  Code Status: Level 1 - Full Code    Subjective:   Patient states her pain is much improved  She is tolerating diet  She realizes that she still having fever and may need to have re-evaluation for surgical intervention      Objective:   Vitals:   Temp (24hrs), Av 9 °F (37 2 °C), Min:98 6 °F (37 °C), Max:99 1 °F (37 3 °C)    HR:  [69-73] 71  Resp:  [18] 18  BP: (152-180)/(67-70) 179/67  SpO2:  [99 %] 99 %  Body mass index is 30 44 kg/m²  Input and Output Summary (last 24 hours): Intake/Output Summary (Last 24 hours) at 07/05/18 0509  Last data filed at 07/04/18 1900   Gross per 24 hour   Intake              300 ml   Output                0 ml   Net              300 ml       Physical Exam:     Physical Exam   Constitutional: She is oriented to person, place, and time  She appears well-developed and well-nourished  No distress  HENT:   Head: Normocephalic and atraumatic  Right Ear: External ear normal    Left Ear: External ear normal    Nose: Nose normal    Mouth/Throat: Oropharynx is clear and moist  No oropharyngeal exudate  Eyes: Conjunctivae and EOM are normal  Pupils are equal, round, and reactive to light  Right eye exhibits no discharge  Left eye exhibits no discharge  No scleral icterus  Neck: Normal range of motion  Neck supple  No JVD present  No thyromegaly present  Cardiovascular: Normal rate, regular rhythm, normal heart sounds and intact distal pulses  Exam reveals no gallop and no friction rub  No murmur heard  Pulmonary/Chest: Effort normal and breath sounds normal  No respiratory distress  She has no wheezes  She has no rales  Abdominal: Soft  Bowel sounds are normal  She exhibits no distension  There is no tenderness  There is no rebound and no guarding  Musculoskeletal: Normal range of motion  She exhibits no edema or deformity  Lymphadenopathy:     She has no cervical adenopathy  Neurological: She is alert and oriented to person, place, and time  She has normal reflexes  No cranial nerve deficit  She exhibits normal muscle tone  Skin: Skin is warm and dry  No rash noted  She is not diaphoretic  No erythema  Psychiatric: She has a normal mood and affect  Vitals reviewed        Additional Data:   Labs:    Results from last 7 days  Lab Units 07/04/18  0439 07/03/18  0540   WBC Thousand/uL 15 69* 14 27*   HEMOGLOBIN g/dL 11 2* 11 3*   HEMATOCRIT % 34 0* 34 2*   PLATELETS Thousands/uL 294 271   NEUTROS PCT %  --  73   LYMPHS PCT %  --  14   LYMPHO PCT % 19  --    MONOS PCT %  --  11   MONO PCT MAN % 9  --    EOS PCT %  --  1   EOSINO PCT MANUAL % 1  --        Results from last 7 days  Lab Units 07/04/18  0439  06/30/18  1917   SODIUM mmol/L 143  < > 137   POTASSIUM mmol/L 3 6  < > 4 4   CHLORIDE mmol/L 106  < > 100   CO2 mmol/L 28  < > 34*   BUN mg/dL 4*  < > 18   CREATININE mg/dL 0 76  < > 0 77   CALCIUM mg/dL 8 8  < > 9 4   TOTAL PROTEIN g/dL  --   --  7 9   BILIRUBIN TOTAL mg/dL  --   --  0 40   ALK PHOS U/L  --   --  69   ALT U/L  --   --  28   AST U/L  --   --  31   GLUCOSE RANDOM mg/dL 102  < > 112   < > = values in this interval not displayed  * I Have Reviewed All Lab Data Listed Above  * Additional Pertinent Lab Tests Reviewed:  Hiram 67 Admission Reviewed    Imaging:    Imaging Reports Reviewed Today Include:  None iCultures:   Blood Culture:   Lab Results   Component Value Date    BLOODCX No Growth at 72 hrs  07/01/2018    BLOODCX No Growth at 72 hrs  07/01/2018     Urine Culture:   Lab Results   Component Value Date    URINECX <10,000 cfu/ml Gram Negative Jas (A) 06/30/2018     Sputum Culture: No components found for: SPUTUMCX  Wound Culture: No results found for: WOUNDCULT    Last 24 Hours Medication List:     Current Facility-Administered Medications:  acetaminophen 650 mg Oral Q6H PRN Bryanna Edmonds PA-C    amLODIPine 5 mg Oral Daily DEYSI Winston    enoxaparin 40 mg Subcutaneous Daily Olegario Menash MD    hydrochlorothiazide 12 5 mg Oral Daily DEYSI Winston    insulin lispro 1-5 Units Subcutaneous TID  Bryanna Edmonds PA-C    lisinopril 40 mg Oral Daily DEYSI Winston    metoprolol succinate 100 mg Oral Daily DEYSI Winston    ondansetron 4 mg Intravenous Q6H PRN Hyacinth Booker MD    oxyCODONE-acetaminophen 1 tablet Oral Q4H PRN Sherry Barillas PA-C piperacillin-tazobactam 3 375 g Intravenous Q6H Sd Woods MD Last Rate: 3 375 g (07/05/18 0000)   pravastatin 20 mg Oral Daily DEYSI Winston    sodium chloride 1,000 mL Intravenous Once Mena Goodpasture, DO         Today, Patient Was Seen By: Yocasta Kennedy MD    ** Please Note: Dragon 360 Dictation voice to text software may have been used in the creation of this document   **

## 2018-07-05 NOTE — ASSESSMENT & PLAN NOTE
In the setting of acute appendicitis with her  No IR Candidate for draining  Patient does not meet SIRS criteria  Manage by General surgery  Monitor WBC and temp  Blood cultures  Continue Zosyn IV    Patient scheduled for CT in a m  possible further surgical intervention per surgical team

## 2018-07-05 NOTE — ASSESSMENT & PLAN NOTE
Complex 4 8 cm right lower quadrant collection containing gas, fluid as well as peripheral enhancement and a large rounded internal calcification thought to most likely represent an appendicolith in a periappendiceal abscess from a ruptured   appendicitis  [facility] improving  General Surgery Managing     Cont IV antibiotic  Patient to obtain CT abdomen in a m  as she has persistent fever

## 2018-07-05 NOTE — ASSESSMENT & PLAN NOTE
Lab Results   Component Value Date    HGBA1C 6 2 06/05/2018       Recent Labs      07/04/18   0557  07/04/18   1113  07/04/18   1716  07/04/18   2116   POCGLU  109  196*  114  167*     Hold Metformin  SSI, monitor BS Q 6 hours  Low Carb Diet       Blood Sugar Average: Last 72 hrs:  (P) 431 [id number]   Cont SSI

## 2018-07-05 NOTE — CASE MANAGEMENT
Continued Stay Review    Date: 7/5/18    Vital Signs: /70 (BP Location: Right arm)   Pulse 77   Temp 98 3 °F (36 8 °C) (Oral)   Resp 18   Ht 5' 2" (1 575 m)   Wt 75 5 kg (166 lb 7 2 oz)   SpO2 99%   BMI 30 44 kg/m²     Medications:   Scheduled Meds:   Current Facility-Administered Medications:  acetaminophen 650 mg Oral Q6H PRN Bryanna Edmonds PA-C    amLODIPine 5 mg Oral Daily DEYSI Winston    enoxaparin 40 mg Subcutaneous Daily Krista Mensah MD    hydrochlorothiazide 12 5 mg Oral Daily DEYSI Winston    insulin lispro 1-5 Units Subcutaneous TID AC Bryanna Edmonds PA-C    iohexol 50 mL Oral 90 min pre-procedure Yumiko Sanchez MD    lisinopril 40 mg Oral Daily DEYSI Winston    metoprolol succinate 100 mg Oral Daily DEYSI Winston    ondansetron 4 mg Intravenous Q6H PRN Silvia Negrete MD    oxyCODONE-acetaminophen 1 tablet Oral Q4H PRN Isatu Barillas PA-C    piperacillin-tazobactam 3 375 g Intravenous Q6H Krista Mensah MD Last Rate: 3 375 g (07/05/18 0559)   pravastatin 20 mg Oral Daily DEYSI Winston    sodium chloride 1,000 mL Intravenous Once Kika Zafar DO      Continuous Infusions:    PRN Meds:   acetaminophen    ondansetron    oxyCODONE-acetaminophen    Abnormal Labs/Diagnostic Results:    WBC 10 91 (H) 07/05/2018     HGB 12 2 07/05/2018     HCT 37 2 07/05/2018     MCV 78 (L) 07/05/2018      07/05/2018     MCH 25 4 (L) 07/05/2018     MCHC 32 8 07/05/2018     RDW 14 2 07/05/2018     MPV 8 1 (L) 07/05/2018     NRBC 0 07/05/2018        BMP/CMP:        Lab Results   Component Value Date      07/04/2018     K 3 6 07/04/2018      07/04/2018     CO2 28 07/04/2018     ANIONGAP 9 07/04/2018     BUN 4 (L) 07/04/2018     CREATININE 0 76 07/04/2018     GLUCOSE 102 07/04/2018     CALCIUM 8 8 07/04/2018     AST 31 06/30/2018     ALT 28 06/30/2018     ALKPHOS 69 06/30/2018     PROT 7 9 06/30/2018     BILITOT 0 40 06/30/2018     EGFR 84 07/04/2018       Ct Abdomen Pelvis With Contrast     Result Date: 6/30/2018  Impression: 1  Complex 4 8 cm right lower quadrant collection containing gas, fluid as well as peripheral enhancement and a large rounded internal calcification thought to most likely represent an appendicolith in a periappendiceal abscess from a ruptured appendicitis  Less likely differential diagnosis includes perforated malignancy and associated abscess  Surgical evaluation recommended  2   Lobulated spleen with numerous small splenic mass lesions, possibly hemangiomas  Other splenic lesions not excluded  Consider follow-up surveillance ultrasound in 3 months for further characterization and surveillance purposes  Considerations related to the patient's age and/or comorbidities may be used to alter these recommendations  3   Mild right hydroureteronephrosis likely secondary to the distal right ureter being enveloped by portions of the right lower quadrant abscess  4   Diverticulosis 5    Enlarged myomatous uterus with large calcified fibroid         Age/Sex: 81 y o  female     Assessment/Plan: Acute appendicitis with perforation and abscess, clinically stable  - Leukocytosis improving: 10 9 (15 69), afebrile over 24h, abdominal pain improved, +BF  - CT scan scheduled for this afternoon  HTN  DM           Plan      Follow CT scan  Continue with diet (NPO for CT, then can have regular diet)  Continue with medical management per farzana DOMINGUEZ input/recommendations       Discharge Plan:  TO BE DETERMINED

## 2018-07-06 ENCOUNTER — APPOINTMENT (INPATIENT)
Dept: CT IMAGING | Facility: HOSPITAL | Age: 83
DRG: 853 | End: 2018-07-06
Payer: MEDICARE

## 2018-07-06 PROBLEM — A41.9 SEPSIS (HCC): Status: ACTIVE | Noted: 2018-07-06

## 2018-07-06 LAB
ANION GAP SERPL CALCULATED.3IONS-SCNC: 8 MMOL/L (ref 4–13)
BACTERIA BLD CULT: NORMAL
BACTERIA BLD CULT: NORMAL
BUN SERPL-MCNC: 19 MG/DL (ref 5–25)
CALCIUM SERPL-MCNC: 9.3 MG/DL (ref 8.3–10.1)
CHLORIDE SERPL-SCNC: 103 MMOL/L (ref 100–108)
CO2 SERPL-SCNC: 28 MMOL/L (ref 21–32)
CREAT SERPL-MCNC: 0.98 MG/DL (ref 0.6–1.3)
ERYTHROCYTE [DISTWIDTH] IN BLOOD BY AUTOMATED COUNT: 14.6 % (ref 11.6–15.1)
GFR SERPL CREATININE-BSD FRML MDRD: 62 ML/MIN/1.73SQ M
GLUCOSE SERPL-MCNC: 107 MG/DL (ref 65–140)
GLUCOSE SERPL-MCNC: 115 MG/DL (ref 65–140)
GLUCOSE SERPL-MCNC: 127 MG/DL (ref 65–140)
GLUCOSE SERPL-MCNC: 133 MG/DL (ref 65–140)
GLUCOSE SERPL-MCNC: 136 MG/DL (ref 65–140)
GLUCOSE SERPL-MCNC: 292 MG/DL (ref 65–140)
HBV SURFACE AG SER QL: NORMAL
HCT VFR BLD AUTO: 37.8 % (ref 34.8–46.1)
HCV AB SER QL: NORMAL
HGB BLD-MCNC: 12.5 G/DL (ref 11.5–15.4)
HIV 1+2 AB+HIV1 P24 AG SERPL QL IA: NORMAL
HIV1 P24 AG SER QL: NORMAL
INR PPP: 0.98 (ref 0.86–1.17)
MCH RBC QN AUTO: 25.8 PG (ref 26.8–34.3)
MCHC RBC AUTO-ENTMCNC: 33.1 G/DL (ref 31.4–37.4)
MCV RBC AUTO: 78 FL (ref 82–98)
PLATELET # BLD AUTO: 354 THOUSANDS/UL (ref 149–390)
PMV BLD AUTO: 8.1 FL (ref 8.9–12.7)
POTASSIUM SERPL-SCNC: 3.8 MMOL/L (ref 3.5–5.3)
PROTHROMBIN TIME: 12.9 SECONDS (ref 11.8–14.2)
RBC # BLD AUTO: 4.84 MILLION/UL (ref 3.81–5.12)
SODIUM SERPL-SCNC: 139 MMOL/L (ref 136–145)
T3 SERPL-MCNC: 1.1 NG/ML (ref 0.6–1.8)
T4 FREE SERPL-MCNC: 1.15 NG/DL (ref 0.76–1.46)
TSH SERPL DL<=0.05 MIU/L-ACNC: 0.15 UIU/ML (ref 0.36–3.74)
WBC # BLD AUTO: 15.43 THOUSAND/UL (ref 4.31–10.16)

## 2018-07-06 PROCEDURE — 84480 ASSAY TRIIODOTHYRONINE (T3): CPT | Performed by: INTERNAL MEDICINE

## 2018-07-06 PROCEDURE — 0W9J3ZX DRAINAGE OF PELVIC CAVITY, PERCUTANEOUS APPROACH, DIAGNOSTIC: ICD-10-PCS | Performed by: RADIOLOGY

## 2018-07-06 PROCEDURE — 49406 IMAGE CATH FLUID PERI/RETRO: CPT

## 2018-07-06 PROCEDURE — 10030 IMG GID FLU COLL DRG SFT TIS: CPT

## 2018-07-06 PROCEDURE — C1729 CATH, DRAINAGE: HCPCS

## 2018-07-06 PROCEDURE — 99231 SBSQ HOSP IP/OBS SF/LOW 25: CPT | Performed by: INTERNAL MEDICINE

## 2018-07-06 PROCEDURE — 84443 ASSAY THYROID STIM HORMONE: CPT | Performed by: INTERNAL MEDICINE

## 2018-07-06 PROCEDURE — 77012 CT SCAN FOR NEEDLE BIOPSY: CPT | Performed by: RADIOLOGY

## 2018-07-06 PROCEDURE — 85027 COMPLETE CBC AUTOMATED: CPT | Performed by: INTERNAL MEDICINE

## 2018-07-06 PROCEDURE — 99152 MOD SED SAME PHYS/QHP 5/>YRS: CPT

## 2018-07-06 PROCEDURE — 10160 PNXR ASPIR ABSC HMTMA BULLA: CPT | Performed by: RADIOLOGY

## 2018-07-06 PROCEDURE — 87205 SMEAR GRAM STAIN: CPT | Performed by: SURGERY

## 2018-07-06 PROCEDURE — 87070 CULTURE OTHR SPECIMN AEROBIC: CPT | Performed by: SURGERY

## 2018-07-06 PROCEDURE — 99153 MOD SED SAME PHYS/QHP EA: CPT

## 2018-07-06 PROCEDURE — 80048 BASIC METABOLIC PNL TOTAL CA: CPT | Performed by: INTERNAL MEDICINE

## 2018-07-06 PROCEDURE — 87077 CULTURE AEROBIC IDENTIFY: CPT | Performed by: SURGERY

## 2018-07-06 PROCEDURE — 82948 REAGENT STRIP/BLOOD GLUCOSE: CPT

## 2018-07-06 PROCEDURE — 87186 SC STD MICRODIL/AGAR DIL: CPT | Performed by: SURGERY

## 2018-07-06 PROCEDURE — 99232 SBSQ HOSP IP/OBS MODERATE 35: CPT | Performed by: PHYSICIAN ASSISTANT

## 2018-07-06 PROCEDURE — 85610 PROTHROMBIN TIME: CPT | Performed by: RADIOLOGY

## 2018-07-06 PROCEDURE — 86803 HEPATITIS C AB TEST: CPT | Performed by: PHYSICIAN ASSISTANT

## 2018-07-06 PROCEDURE — 87340 HEPATITIS B SURFACE AG IA: CPT | Performed by: PHYSICIAN ASSISTANT

## 2018-07-06 PROCEDURE — 99152 MOD SED SAME PHYS/QHP 5/>YRS: CPT | Performed by: RADIOLOGY

## 2018-07-06 PROCEDURE — 87806 HIV AG W/HIV1&2 ANTB W/OPTIC: CPT | Performed by: PHYSICIAN ASSISTANT

## 2018-07-06 PROCEDURE — 84439 ASSAY OF FREE THYROXINE: CPT | Performed by: INTERNAL MEDICINE

## 2018-07-06 RX ORDER — SODIUM CHLORIDE 9 MG/ML
75 INJECTION, SOLUTION INTRAVENOUS CONTINUOUS
Status: DISCONTINUED | OUTPATIENT
Start: 2018-07-06 | End: 2018-07-07

## 2018-07-06 RX ORDER — LIDOCAINE HYDROCHLORIDE 10 MG/ML
INJECTION, SOLUTION INFILTRATION; PERINEURAL CODE/TRAUMA/SEDATION MEDICATION
Status: COMPLETED | OUTPATIENT
Start: 2018-07-06 | End: 2018-07-06

## 2018-07-06 RX ORDER — MIDAZOLAM HYDROCHLORIDE 1 MG/ML
INJECTION INTRAMUSCULAR; INTRAVENOUS CODE/TRAUMA/SEDATION MEDICATION
Status: COMPLETED | OUTPATIENT
Start: 2018-07-06 | End: 2018-07-06

## 2018-07-06 RX ORDER — FENTANYL CITRATE 50 UG/ML
INJECTION, SOLUTION INTRAMUSCULAR; INTRAVENOUS CODE/TRAUMA/SEDATION MEDICATION
Status: COMPLETED | OUTPATIENT
Start: 2018-07-06 | End: 2018-07-06

## 2018-07-06 RX ADMIN — HYDROCHLOROTHIAZIDE 12.5 MG: 12.5 TABLET ORAL at 09:01

## 2018-07-06 RX ADMIN — METOPROLOL SUCCINATE 100 MG: 100 TABLET, EXTENDED RELEASE ORAL at 09:01

## 2018-07-06 RX ADMIN — MIDAZOLAM HYDROCHLORIDE 1 MG: 1 INJECTION, SOLUTION INTRAMUSCULAR; INTRAVENOUS at 14:00

## 2018-07-06 RX ADMIN — PRAVASTATIN SODIUM 20 MG: 20 TABLET ORAL at 20:32

## 2018-07-06 RX ADMIN — LIDOCAINE HYDROCHLORIDE 5 ML: 10 INJECTION, SOLUTION INFILTRATION; PERINEURAL at 14:01

## 2018-07-06 RX ADMIN — FENTANYL CITRATE 50 MCG: 50 INJECTION, SOLUTION INTRAMUSCULAR; INTRAVENOUS at 14:00

## 2018-07-06 RX ADMIN — PIPERACILLIN SODIUM,TAZOBACTAM SODIUM 3.38 G: 3; .375 INJECTION, POWDER, FOR SOLUTION INTRAVENOUS at 05:00

## 2018-07-06 RX ADMIN — PIPERACILLIN SODIUM,TAZOBACTAM SODIUM 3.38 G: 3; .375 INJECTION, POWDER, FOR SOLUTION INTRAVENOUS at 11:03

## 2018-07-06 RX ADMIN — PIPERACILLIN SODIUM,TAZOBACTAM SODIUM 3.38 G: 3; .375 INJECTION, POWDER, FOR SOLUTION INTRAVENOUS at 17:43

## 2018-07-06 RX ADMIN — AMLODIPINE BESYLATE 5 MG: 5 TABLET ORAL at 09:01

## 2018-07-06 RX ADMIN — MIDAZOLAM HYDROCHLORIDE 1 MG: 1 INJECTION, SOLUTION INTRAMUSCULAR; INTRAVENOUS at 13:49

## 2018-07-06 RX ADMIN — PIPERACILLIN SODIUM,TAZOBACTAM SODIUM 3.38 G: 3; .375 INJECTION, POWDER, FOR SOLUTION INTRAVENOUS at 22:57

## 2018-07-06 RX ADMIN — LISINOPRIL 40 MG: 20 TABLET ORAL at 09:01

## 2018-07-06 RX ADMIN — FENTANYL CITRATE 50 MCG: 50 INJECTION, SOLUTION INTRAMUSCULAR; INTRAVENOUS at 13:49

## 2018-07-06 RX ADMIN — SODIUM CHLORIDE 75 ML/HR: 0.9 INJECTION, SOLUTION INTRAVENOUS at 11:04

## 2018-07-06 NOTE — BRIEF OP NOTE (RAD/CATH)
Image guided aspiration right lower quadrant abscess  Procedure Note    PATIENT NAME: Jyoti Lewis  : 1935  MRN: [id number]     Pre-op Diagnosis:   1  Perforated appendicitis    2  Essential hypertension    3  Type 2 diabetes mellitus without complication, without long-term current use of insulin (Formerly [facility])      Post-op Diagnosis:   1  Perforated appendicitis    2  Essential hypertension    3  Type 2 diabetes mellitus without complication, without long-term current use of insulin [facility])        Surgeon:   Monika Busby MD  Assistants:     No qualified resident was available, Resident is only observing    Estimated Blood Loss:  Minimal, 1 mL or less  Findings:   Preprocedure CT scans suggest interval decrease in size of abscess around perforated appendicitis  Successful placement of 5 Australian catheter needle in the right lower quadrant collection with return of small amount purulent green purulent fluid  Additional site was selected for sampling to augment the specimen and the needle was repositioned under CT into the collection, with return of purulent green fluid  The green fluid was sent for Gram stain, culture, dialysis  Completion image demonstrates no acute complication  Surgery and [facility] service will follow-up results of the culture and make any antibiotic changes that are required  Plan for patient to undergo definitive surgical treatment in the coming weeks per surgery's direction      Specimens:  6 mL purulent green fluid    Complications:  Nothing immediately apparent    Anesthesia: Conscious sedation and Local    Monika Busby MD     Date: 2018  Time: 2:29 PM

## 2018-07-06 NOTE — ASSESSMENT & PLAN NOTE
Lab Results   Component Value Date    HGBA1C 6 2 06/05/2018       Recent Labs      07/05/18   0613  07/05/18   1118  07/05/18   1608  07/05/18   2123   POCGLU  119  148*  185*  142*     Hold Metformin  SSI, monitor BS Q 6 hours  Low Carb Diet       Blood Sugar Average: Last 72 hrs:  (P) 154 75   Cont SSI

## 2018-07-06 NOTE — PROGRESS NOTES
Progress Note -Surgery PA  Keyona Zapien 81 y o  female MRN: [id number]  Unit/Bed#: -01 Encounter: [id number]      Assessment   Acute appendicitis with perforation and abscess  - elevated WBC today - 15 43 (10 9), continues to be afebrile, no abdominal pain, +BF, no n/v  - Repeat CT yesterday shows persistent/stable periappendiceal abscess consistent with perforated appendicitis and fecolith  HTN  DM      Plan   IR consulted for possible drainage  Patient NPO for possible IR procedure  May return to diet after IR reviews/procedure  Continue IV Antibiotics  Gentle IVF while NPO  Incentive spirometry  Encourage ambulation  Continue Medical management per SLIM, appreciate input    ______________________________________________________________________    Subjective:   Feeling well  Denies n/v/f/c, abdominal pain, SOB, palpitations, cough  +BF  Objective:       Vitals:  /66 (BP Location: Right arm)   Pulse 64   Temp 98 3 °F (36 8 °C) (Oral)   Resp 18   Ht 5' 2" (1 575 m)   Wt 75 5 kg (166 lb 7 2 oz)   SpO2 100%   BMI 30 44 kg/m²     I/Os:  I/O last 3 completed shifts: In: 1250 [P O :1250]  Out: -     No intake/output data recorded      Invasive Devices     Peripheral Intravenous Line            Peripheral IV 07/02/18 Right Forearm 3 days                Medications:  Current Facility-Administered Medications   Medication Dose Route Frequency    acetaminophen (TYLENOL) tablet 650 mg  650 mg Oral Q6H PRN    amLODIPine (NORVASC) tablet 5 mg  5 mg Oral Daily    enoxaparin (LOVENOX) subcutaneous injection 40 mg  40 mg Subcutaneous Daily    hydrochlorothiazide (HYDRODIURIL) tablet 12 5 mg  12 5 mg Oral Daily    insulin lispro (HumaLOG) 100 units/mL subcutaneous injection 1-5 Units  1-5 Units Subcutaneous TID AC    lisinopril (ZESTRIL) tablet 40 mg  40 mg Oral Daily    metoprolol succinate (TOPROL-XL) 24 hr tablet 100 mg  100 mg Oral Daily    ondansetron (ZOFRAN) injection 4 mg  4 mg Intravenous Q6H PRN    oxyCODONE-acetaminophen (PERCOCET) 5-325 mg per tablet 1 tablet  1 tablet Oral Q4H PRN    piperacillin-tazobactam (ZOSYN) 3 375 g in sodium chloride 0 9 % 50 mL IVPB  3 375 g Intravenous Q6H    pravastatin (PRAVACHOL) tablet 20 mg  20 mg Oral Daily    sodium chloride 0 9 % bolus 1,000 mL  1,000 mL Intravenous Once                 Lab Results and Cultures:   CBC with diff:   Lab Results   Component Value Date    WBC 15 43 (H) 07/06/2018    HGB 12 5 07/06/2018    HCT 37 8 07/06/2018    MCV 78 (L) 07/06/2018     07/06/2018    MCH 25 8 (L) 07/06/2018    MCHC 33 1 07/06/2018    RDW 14 6 07/06/2018    MPV 8 1 (L) 07/06/2018    NRBC 0 07/05/2018       BMP/CMP:  Lab Results   Component Value Date     07/06/2018    K 3 8 07/06/2018     07/06/2018    CO2 28 07/06/2018    ANIONGAP 8 07/06/2018    BUN 19 07/06/2018    CREATININE 0 98 07/06/2018    GLUCOSE 136 07/06/2018    CALCIUM 9 3 07/06/2018    AST 31 06/30/2018    ALT 28 06/30/2018    ALKPHOS 69 06/30/2018    PROT 7 9 06/30/2018    BILITOT 0 40 06/30/2018    EGFR 62 07/06/2018       Lipid Panel:   Lab Results   Component Value Date    CHOL 110 06/05/2018         Physical Exam:  General Appearance:    Alert and orientated x 3, cooperative, no distress, appears stated age   Lungs:     Clear to auscultation bilaterally, respirations unlabored, no wheezes    Heart:    Regular rate and rhythm, S1 and S2 normal, no murmur   Abdomen:    Normoactive BS, soft, non tender, non rigid, no masses, no palpated organomegaly   Extremities:  Extremities normal, no calf tenderness, no cyanosis or edema   Pulses:   2+ and symmetric all extremities   Skin:   Skin color, texture, turgor normal, no rashes   Neurologic:   CNII-XII intact, normal strength, affect appropriate       Imaging:  Ct Abdomen Pelvis W Contrast    Result Date: 7/5/2018  Impression: 1  Findings remain consistent with perforated appendicitis and abscess formation as above    Persistent associated inflammatory change and mild free fluid noted  2   Persistent mild to moderate right hydroureteronephrosis likely secondary to periappendiceal abscess collection causing partial ureteral obstruction at this level  3   Stable innumerable splenic hypodensities  Follow-up ultrasound is again recommended to ensure stability  The study was marked in EPIC for significant notification  Workstation performed: [id number]     Ct Abdomen Pelvis With Contrast    Result Date: 6/30/2018  Impression: 1  Complex 4 8 cm right lower quadrant collection containing gas, fluid as well as peripheral enhancement and a large rounded internal calcification thought to most likely represent an appendicolith in a periappendiceal abscess from a ruptured appendicitis  Less likely differential diagnosis includes perforated malignancy and associated abscess  Surgical evaluation recommended  2   Lobulated spleen with numerous small splenic mass lesions, possibly hemangiomas  Other splenic lesions not excluded  Consider follow-up surveillance ultrasound in 3 months for further characterization and surveillance purposes  Considerations related to the patient's age and/or comorbidities may be used to alter these recommendations  3   Mild right hydroureteronephrosis likely secondary to the distal right ureter being enveloped by portions of the right lower quadrant abscess  4   Diverticulosis 5  Enlarged myomatous uterus with large calcified fibroid    I personally discussed this study with AIMEE WHITE on 6/30/2018 at 8:40 PM  Workstation performed: [id number]       VTE Pharmacologic Prophylaxis: Enoxaparin (Lovenox)  VTE Mechanical Prophylaxis: sequential compression device    Kirit Gonzalez PA-C   7/6/2018

## 2018-07-06 NOTE — ASSESSMENT & PLAN NOTE
Sepsis as evidenced by PTA fever, tachycardia, source for infection and leukocytosis  Has been stable on conservative treatment , now s/p IR drainage   Monitor for regression

## 2018-07-06 NOTE — ASSESSMENT & PLAN NOTE
Complex 4 8 cm right lower quadrant collection containing gas, fluid as well as peripheral enhancement and a large rounded internal calcification thought to most likely represent an appendicolith in a periappendiceal abscess from a ruptured   appendicitis   Less likely differential diagnosis includes perforated malignancy and associated abscess  General Surgery Manging  Pain improved tolerating diet, WBC count improved

## 2018-07-06 NOTE — ASSESSMENT & PLAN NOTE
Complex 4 8 cm right lower quadrant collection containing gas, fluid as well as peripheral enhancement and a large rounded internal calcification thought to most likely represent an appendicolith in a periappendiceal abscess from a ruptured   appendicitis  [facility] improving  General Surgery Managing  Cont IV antibiotic  · CT showing persistent abcess with appendolith and inflammatory changes  Recs per surgery

## 2018-07-06 NOTE — PROGRESS NOTES
Vascular and Interventional Radiology Pre Procedure Note    Diagnosis:  Perforated appendicitis, right lower quadrant abscess    Procedure:  Image guided aspiration of right lower quadrant abscess    HPI:  81-year-old female with known diagnosis of perforated appendicitis with subsequent abscess formation about a large appendicolith  Patient has been treated conservatively with antibiotics but continues to have elevated white blood cell count and temperature spikes  Patient denies abdominal pain, nausea, vomiting, chest pain, shortness of breath, chills  Repeat CT scan performed 07/05/2018 demonstrated persistent abscess about the the free appendicolith  Drainage or sampling has been requested  Exam:  General:  Awake, alert, oriented x3, no acute distress  Neck:  Soft, supple, nontender  Chest:  Nontender, no deformity  Abdomen:  Soft, nontender, nondistended, no guarding, no rebound  Cardiac:  S1-S2, regular rate and rhythm  Lungs:  Bilateral air entry, nonlabored breathing, speaking in full sentences  Labs:  Hematology:  WBC 15 43, hemoglobin 12 5, hematocrit 37 8, platelets 469  Chemistry:  Sodium 139, potassium 3 8, chloride 103, carbon dioxide 28, BUN 19, creatinine 0 98, glucose 136  Coagulation:  INR 0 98, prothrombin time 12 9    Imaging:  CT scan dated 07/05/2018 and 06/30/2018 reviewed  Patient appears to have perforated appendicitis with abscess formation about a large appendicolith  Plan:  Initially consult was for drain placement  However, the collection itself is very small and would likely not tolerated drain  Aspiration can be performed, however definitive management will be surgery as the appendicolith will likely remain a nidus for infection  This was discussed in detail with Dr Fidelia Bhagat who agrees  He has requested aspiration of the fluid for sampling and potential narrowing of antibiotics    The plan is for the patient to undergo appendectomy in the future for definitive treatment  I discussed the procedure, its details, risks, benefits and alternatives with the patient  All questions were answered  Patient elects to proceed with the procedure  H&P was reviewed

## 2018-07-06 NOTE — ASSESSMENT & PLAN NOTE
In the setting of acute appendicitis with her  No IR Candidate for draining  Patient does not meet SIRS criteria  Manage by General surgery  Monitor WBC and temp  Blood cultures  Continue Zosyn IV  Improving but still faced with abcess per CT   Decisions for next steps per surgery

## 2018-07-06 NOTE — PROGRESS NOTES
Progress Note - Shaun Hall 1935, 81 y o  female MRN: [id number]    Unit/Bed#: -Antonio Encounter: [id number]    Primary Care Provider: Suzanne Olivares MD   Date and time admitted to hospital: 6/30/2018  6:30 PM        * Perforated appendicitis   Assessment & Plan    Complex 4 8 cm right lower quadrant collection containing gas, fluid as well as peripheral enhancement and a large rounded internal calcification thought to most likely represent an appendicolith in a periappendiceal abscess from a ruptured   appendicitis  [facility] improving  General Surgery Managing  Cont IV antibiotic  · CT showing persistent abcess with appendolith and inflammatory changes  Recs per surgery  Periumbilical abdominal pain   Assessment & Plan    Complex 4 8 cm right lower quadrant collection containing gas, fluid as well as peripheral enhancement and a large rounded internal calcification thought to most likely represent an appendicolith in a periappendiceal abscess from a ruptured   appendicitis   Less likely differential diagnosis includes perforated malignancy and associated abscess  General Surgery Manging  Pain improved tolerating diet, WBC count improved  Leukocytosis   Assessment & Plan    In the setting of acute appendicitis with her  No IR Candidate for draining  Patient does not meet SIRS criteria  Manage by General surgery  Monitor WBC and temp  Blood cultures  Continue Zosyn IV  Improving but still faced with abcess per CT  Decisions for next steps per surgery          Type 2 diabetes mellitus without complication, without long-term current use of insulin [facility])   Assessment & Plan    Lab Results   Component Value Date    HGBA1C 6 2 06/05/2018       Recent Labs      07/05/18   0613  07/05/18   1118  07/05/18   1608  07/05/18   2123   POCGLU  119  148*  185*  142*     Hold Metformin  SSI, monitor BS Q 6 hours  Low Carb Diet       Blood Sugar Average: Last 72 hrs:  (P) 154 75   Cont SSI Mixed hyperlipidemia   Assessment & Plan    Cont Statin        Essential hypertension   Assessment & Plan    stable  Continue home regimen of metoprolol, lisinopril, Norvasc and hydrochlorothiazide  Will cover with p r n  hydralazine at this time  Continue monitor, intermittent spikes usually  Associated with fever  Have improved  Hypokalemia   Assessment & Plan    Discussed with Suregery Team repleted orally  Serial labs        Abnormal TSH   Assessment & Plan    Recheck with am labs  Back pain   Assessment & Plan    Right lower back  Secondary to Mild right hydroureteronephrosis likely secondary to the distal right ureter being enveloped by portions of the right lower quadrant abscess  General surgery managing  Pain improved  CT scan shows persistent abcess with right hydroureter  Creatinine has been stable   Surgery assess options  VTE Pharmacologic Prophylaxis:   Pharmacologic: Enoxaparin (Lovenox)  Mechanical: Mechanical VTE prophylaxis in place  Patient Centered Rounds: walking rounds with nursing  Discussions with Specialists or Other Care Team Provider: surgery notes reviewed  Education and Discussions with Family / Patient: no family at bedside, patient updated  Time Spent for Care: 20 minutes  If More than 50% of total time spent on counseling and coordination of care as described above indicate yes or no and described the counseling and coordination: no    Current Length of Stay: 6 day(s)  Current Patient Status: Inpatient   Certification Statement: The patient will continue to require additional inpatient hospital stay due to persistent abcess    Discharge Plan: tbd  Code Status: Level 1 - Full Code    Subjective:   Patient without pain, tolerating diet       Objective:   Vitals:   Temp (24hrs), Av 5 °F (36 9 °C), Min:98 3 °F (36 8 °C), Max:98 8 °F (37 1 °C)    HR:  [62-77] 66  Resp:  [18] 18  BP: (140-161)/(60-72) 148/67  SpO2:  [99 %-100 %] 100 %  Body mass index is 30 44 kg/m²  Input and Output Summary (last 24 hours): Intake/Output Summary (Last 24 hours) at 07/06/18 0429  Last data filed at 07/05/18 1300   Gross per 24 hour   Intake             1250 ml   Output                0 ml   Net             1250 ml       Physical Exam:     Physical Exam   Constitutional: She is oriented to person, place, and time  She appears well-developed and well-nourished  No distress  HENT:   Head: Normocephalic and atraumatic  Right Ear: External ear normal    Left Ear: External ear normal    Nose: Nose normal    Mouth/Throat: Oropharynx is clear and moist  No oropharyngeal exudate  Eyes: Conjunctivae and EOM are normal  Pupils are equal, round, and reactive to light  Right eye exhibits no discharge  Left eye exhibits no discharge  No scleral icterus  Neck: Normal range of motion  Neck supple  No JVD present  No thyromegaly present  Cardiovascular: Normal rate, regular rhythm, normal heart sounds and intact distal pulses  Exam reveals no gallop and no friction rub  No murmur heard  Pulmonary/Chest: Effort normal and breath sounds normal  No respiratory distress  She has no wheezes  She has no rales  Abdominal: Soft  Bowel sounds are normal  She exhibits no distension  There is no tenderness  There is no rebound and no guarding  Musculoskeletal: Normal range of motion  She exhibits no edema or deformity  Lymphadenopathy:     She has no cervical adenopathy  Neurological: She is alert and oriented to person, place, and time  She has normal reflexes  No cranial nerve deficit  She exhibits normal muscle tone  Skin: Skin is warm and dry  No rash noted  She is not diaphoretic  No erythema  Psychiatric: She has a normal mood and affect  Vitals reviewed        Additional Data:   Labs:    Results from last 7 days  Lab Units 07/05/18  0803  07/03/18  0540   WBC Thousand/uL 10 91*  < > 14 27*   HEMOGLOBIN g/dL 12 2  < > 11 3*   HEMATOCRIT % 37 2  < > 34 2* PLATELETS Thousands/uL 344  < > 271   NEUTROS PCT %  --   --  73   LYMPHS PCT %  --   --  14   LYMPHO PCT % 19  < >  --    MONOS PCT %  --   --  11   MONO PCT MAN % 6  < >  --    EOS PCT %  --   --  1   EOSINO PCT MANUAL % 1  < >  --    < > = values in this interval not displayed  Results from last 7 days  Lab Units 07/04/18  0439  06/30/18  1917   SODIUM mmol/L 143  < > 137   POTASSIUM mmol/L 3 6  < > 4 4   CHLORIDE mmol/L 106  < > 100   CO2 mmol/L 28  < > 34*   BUN mg/dL 4*  < > 18   CREATININE mg/dL 0 76  < > 0 77   CALCIUM mg/dL 8 8  < > 9 4   TOTAL PROTEIN g/dL  --   --  7 9   BILIRUBIN TOTAL mg/dL  --   --  0 40   ALK PHOS U/L  --   --  69   ALT U/L  --   --  28   AST U/L  --   --  31   GLUCOSE RANDOM mg/dL 102  < > 112   < > = values in this interval not displayed  * I Have Reviewed All Lab Data Listed Above  * Additional Pertinent Lab Tests Reviewed: All Labs Within Last 24 Hours Reviewed    Imaging:    Imaging Reports Reviewed Today Include: none    Cultures:   Blood Culture:   Lab Results   Component Value Date    BLOODCX No Growth After 4 Days  07/01/2018    BLOODCX No Growth After 4 Days   07/01/2018     Urine Culture:   Lab Results   Component Value Date    URINECX <10,000 cfu/ml Gram Negative Jas (A) 06/30/2018     Sputum Culture: No components found for: SPUTUMCX  Wound Culture: No results found for: WOUNDCULT    Last 24 Hours Medication List:     Current Facility-Administered Medications:  acetaminophen 650 mg Oral Q6H PRN Bryanna Edmonds PA-C    amLODIPine 5 mg Oral Daily DEYSI Winston    enoxaparin 40 mg Subcutaneous Daily Pierre Mensah MD    hydrochlorothiazide 12 5 mg Oral Daily DEYSI Winston    insulin lispro 1-5 Units Subcutaneous TID AC Bryanna Edmonds PA-C    lisinopril 40 mg Oral Daily DEYSI Winston    metoprolol succinate 100 mg Oral Daily DEYSI Winston    ondansetron 4 mg Intravenous Q6H PRN Polo Serna MD    oxyCODONE-acetaminophen 1 tablet Oral Q4H PRN Yanet Barillas PA-C    piperacillin-tazobactam 3 375 g Intravenous Q6H Jaden Perea MD Last Rate: 3 375 g (07/05/18 0885)   pravastatin 20 mg Oral Daily DEYSI Winston    sodium chloride 1,000 mL Intravenous Once Bora Gonzales DO         Today, Patient Was Seen By: Mansoor Aragon MD    ** Please Note: Dragon 360 Dictation voice to text software may have been used in the creation of this document   **

## 2018-07-06 NOTE — ASSESSMENT & PLAN NOTE
stable  Continue home regimen of metoprolol, lisinopril, Norvasc and hydrochlorothiazide  Will cover with p r n  hydralazine at this time  Continue monitor, intermittent spikes usually  Associated with fever  Have improved

## 2018-07-06 NOTE — ASSESSMENT & PLAN NOTE
Right lower back  Secondary to Mild right hydroureteronephrosis likely secondary to the distal right ureter being enveloped by portions of the right lower quadrant abscess  General surgery managing  Pain improved  CT scan shows persistent abcess with right hydroureter  Creatinine has been stable   Surgery assess options

## 2018-07-07 PROBLEM — E87.6 HYPOKALEMIA: Status: RESOLVED | Noted: 2018-07-04 | Resolved: 2018-07-07

## 2018-07-07 LAB
ANION GAP SERPL CALCULATED.3IONS-SCNC: 7 MMOL/L (ref 4–13)
BASOPHILS # BLD AUTO: 0.04 THOUSANDS/ΜL (ref 0–0.1)
BASOPHILS NFR BLD AUTO: 0 % (ref 0–1)
BUN SERPL-MCNC: 17 MG/DL (ref 5–25)
CALCIUM SERPL-MCNC: 8.8 MG/DL (ref 8.3–10.1)
CHLORIDE SERPL-SCNC: 106 MMOL/L (ref 100–108)
CO2 SERPL-SCNC: 27 MMOL/L (ref 21–32)
CREAT SERPL-MCNC: 0.83 MG/DL (ref 0.6–1.3)
EOSINOPHIL # BLD AUTO: 0.2 THOUSAND/ΜL (ref 0–0.61)
EOSINOPHIL NFR BLD AUTO: 2 % (ref 0–6)
ERYTHROCYTE [DISTWIDTH] IN BLOOD BY AUTOMATED COUNT: 14.5 % (ref 11.6–15.1)
GFR SERPL CREATININE-BSD FRML MDRD: 76 ML/MIN/1.73SQ M
GLUCOSE SERPL-MCNC: 102 MG/DL (ref 65–140)
GLUCOSE SERPL-MCNC: 112 MG/DL (ref 65–140)
GLUCOSE SERPL-MCNC: 122 MG/DL (ref 65–140)
GLUCOSE SERPL-MCNC: 123 MG/DL (ref 65–140)
GLUCOSE SERPL-MCNC: 178 MG/DL (ref 65–140)
HCT VFR BLD AUTO: 34.7 % (ref 34.8–46.1)
HGB BLD-MCNC: 11.3 G/DL (ref 11.5–15.4)
IMM GRANULOCYTES # BLD AUTO: 0.21 THOUSAND/UL (ref 0–0.2)
IMM GRANULOCYTES NFR BLD AUTO: 2 % (ref 0–2)
LYMPHOCYTES # BLD AUTO: 2.08 THOUSANDS/ΜL (ref 0.6–4.47)
LYMPHOCYTES NFR BLD AUTO: 16 % (ref 14–44)
MCH RBC QN AUTO: 25.5 PG (ref 26.8–34.3)
MCHC RBC AUTO-ENTMCNC: 32.6 G/DL (ref 31.4–37.4)
MCV RBC AUTO: 78 FL (ref 82–98)
MONOCYTES # BLD AUTO: 0.97 THOUSAND/ΜL (ref 0.17–1.22)
MONOCYTES NFR BLD AUTO: 8 % (ref 4–12)
NEUTROPHILS # BLD AUTO: 9.29 THOUSANDS/ΜL (ref 1.85–7.62)
NEUTS SEG NFR BLD AUTO: 72 % (ref 43–75)
NRBC BLD AUTO-RTO: 0 /100 WBCS
PLATELET # BLD AUTO: 363 THOUSANDS/UL (ref 149–390)
PMV BLD AUTO: 8.3 FL (ref 8.9–12.7)
POTASSIUM SERPL-SCNC: 3.5 MMOL/L (ref 3.5–5.3)
RBC # BLD AUTO: 4.43 MILLION/UL (ref 3.81–5.12)
SODIUM SERPL-SCNC: 140 MMOL/L (ref 136–145)
WBC # BLD AUTO: 12.79 THOUSAND/UL (ref 4.31–10.16)

## 2018-07-07 PROCEDURE — 82948 REAGENT STRIP/BLOOD GLUCOSE: CPT

## 2018-07-07 PROCEDURE — 99232 SBSQ HOSP IP/OBS MODERATE 35: CPT | Performed by: INTERNAL MEDICINE

## 2018-07-07 PROCEDURE — 99232 SBSQ HOSP IP/OBS MODERATE 35: CPT | Performed by: SURGERY

## 2018-07-07 PROCEDURE — 85025 COMPLETE CBC W/AUTO DIFF WBC: CPT | Performed by: SURGERY

## 2018-07-07 PROCEDURE — 80048 BASIC METABOLIC PNL TOTAL CA: CPT | Performed by: SURGERY

## 2018-07-07 RX ORDER — AMOXICILLIN AND CLAVULANATE POTASSIUM 875; 125 MG/1; MG/1
1 TABLET, FILM COATED ORAL EVERY 12 HOURS SCHEDULED
Status: DISCONTINUED | OUTPATIENT
Start: 2018-07-07 | End: 2018-07-08 | Stop reason: HOSPADM

## 2018-07-07 RX ADMIN — PIPERACILLIN SODIUM,TAZOBACTAM SODIUM 3.38 G: 3; .375 INJECTION, POWDER, FOR SOLUTION INTRAVENOUS at 06:00

## 2018-07-07 RX ADMIN — ACETAMINOPHEN 650 MG: 325 TABLET, FILM COATED ORAL at 15:49

## 2018-07-07 RX ADMIN — AMOXICILLIN AND CLAVULANATE POTASSIUM 1 TABLET: 875; 125 TABLET, FILM COATED ORAL at 12:42

## 2018-07-07 RX ADMIN — AMLODIPINE BESYLATE 5 MG: 5 TABLET ORAL at 10:19

## 2018-07-07 RX ADMIN — METOPROLOL SUCCINATE 100 MG: 100 TABLET, EXTENDED RELEASE ORAL at 10:19

## 2018-07-07 RX ADMIN — HYDROCHLOROTHIAZIDE 12.5 MG: 12.5 TABLET ORAL at 10:19

## 2018-07-07 RX ADMIN — AMOXICILLIN AND CLAVULANATE POTASSIUM 1 TABLET: 875; 125 TABLET, FILM COATED ORAL at 20:38

## 2018-07-07 RX ADMIN — LISINOPRIL 40 MG: 20 TABLET ORAL at 10:19

## 2018-07-07 RX ADMIN — ENOXAPARIN SODIUM 40 MG: 40 INJECTION SUBCUTANEOUS at 10:19

## 2018-07-07 RX ADMIN — PRAVASTATIN SODIUM 20 MG: 20 TABLET ORAL at 20:37

## 2018-07-07 NOTE — ASSESSMENT & PLAN NOTE
In the setting of acute appendicitis with her  No IR Candidate for draining  Patient does not meet SIRS criteria  Manage by General surgery  Monitor WBC and temp  Blood cultures  Continue Zosyn IV  WBC started to increase   IR drainage this am  Patient without complaint currently stable

## 2018-07-07 NOTE — ASSESSMENT & PLAN NOTE
Right lower back  Secondary to Mild right hydroureteronephrosis likely secondary to the distal right ureter being enveloped by portions of the right lower quadrant abscess  General surgery managing  Pain improved  CT scan shows persistent abcess with right hydroureter  Creatinine has been stable   IR drained this am   May need surveillance imaging  For now monitor CRT  Low threshold for urology input  Hopefully, will decompress now that adjacent abscess drained

## 2018-07-07 NOTE — PLAN OF CARE

## 2018-07-07 NOTE — ASSESSMENT & PLAN NOTE
Lab Results   Component Value Date    HGBA1C 6 2 06/05/2018       Recent Labs      07/06/18   1029  07/06/18   1425  07/06/18   1622  07/06/18   2123   POCGLU  127  115  107  292*     Hold Metformin  SSI, monitor BS Q 6 hours  Low Carb Diet       Blood Sugar Average: Last 72 hrs:  (P) 079 [id number]   Cont SSI

## 2018-07-07 NOTE — PROGRESS NOTES
Progress Note - Shaun Hall 1935, 81 y o  female MRN: [id number]    Unit/Bed#: -[facility]io Encounter: [id number]    Primary Care Provider: Suzanne Olivares MD   Date and time admitted to hospital: 6/30/2018  6:30 PM        * Perforated appendicitis   Assessment & Plan    Complex 4 8 cm right lower quadrant collection containing gas, fluid as well as peripheral enhancement and a large rounded internal calcification thought to most likely represent an appendicolith in a periappendiceal abscess from a ruptured   appendicitis  [facility] improving  General Surgery Managing  Cont IV antibiotic  · CT showing persistent abcess with appendolith and inflammatory changes  Pt S/p drainage by IR  Comfortable no complaints        Periumbilical abdominal pain   Assessment & Plan    Complex 4 8 cm right lower quadrant collection containing gas, fluid as well as peripheral enhancement and a large rounded internal calcification thought to most likely represent an appendicolith in a periappendiceal abscess from a ruptured   appendicitis   Less likely differential diagnosis includes perforated malignancy and associated abscess  General Surgery Manging  Pain improved tolerating diet, WBC went back up so IR drained today  Diet per surgery  Leukocytosis   Assessment & Plan    In the setting of acute appendicitis with her  No IR Candidate for draining  Patient does not meet SIRS criteria  Manage by General surgery  Monitor WBC and temp  Blood cultures  Continue Zosyn IV  WBC started to increase  IR drainage this am  Patient without complaint currently stable          Type 2 diabetes mellitus without complication, without long-term current use of insulin [facility])   Assessment & Plan    Lab Results   Component Value Date    HGBA1C 6 2 06/05/2018       Recent Labs      07/06/18   1029  07/06/18   1425  07/06/18   1622  07/06/18   2123   POCGLU  127  115  107  292*     Hold Metformin  SSI, monitor BS Q 6 hours  Low Carb Diet  Blood Sugar Average: Last 72 hrs:  (P) 283 [id number]   Cont SSI        Mixed hyperlipidemia   Assessment & Plan    Cont Statin        Essential hypertension   Assessment & Plan    stable  Continue home regimen of metoprolol, lisinopril, Norvasc and hydrochlorothiazide  Will cover with p r n  hydralazine at this time  Continue monitor, intermittent spikes usually  Associated with fever  Have improved  Hypokalemiaresolved as of 7/7/2018   Assessment & Plan    Discussed with Suregery Team repleted orally  Serial labs        Abnormal TSH   Assessment & Plan    Recheck with am labs  Back pain   Assessment & Plan    Right lower back  Secondary to Mild right hydroureteronephrosis likely secondary to the distal right ureter being enveloped by portions of the right lower quadrant abscess  General surgery managing  Pain improved  CT scan shows persistent abcess with right hydroureter  Creatinine has been stable   IR drained this am   May need surveillance imaging  For now monitor CRT  Low threshold for urology input  Hopefully, will decompress now that adjacent abscess drained  Sepsis ([address] )   Assessment & Plan    Sepsis as evidenced by PTA fever, tachycardia, source for infection and leukocytosis  Has been stable on conservative treatment , now s/p IR drainage   Monitor for regression  VTE Pharmacologic Prophylaxis:   Pharmacologic: Enoxaparin (Lovenox)  Mechanical: Mechanical VTE prophylaxis in place  Patient Centered Rounds: discussed with nursing staff outside the room  Discussions with Specialists or Other Care Team Provider: reviewed notes  Education and Discussions with Family / Patient: updated patient  Time Spent for Care: 25 min    If More than 50% of total time spent on counseling and coordination of care as described above indicate yes or no and described the counseling and coordination:no    Current Length of Stay: 6 day(s)  Current Patient Status: Inpatient Certification Statement: The patient will continue to require additional inpatient hospital stay due to monitor post ir drainage of appendiceal abcess    Discharge Plan: tbd by surgery  Code Status: Level 1 - Full Code    Subjective:   Patient felling ok denies pain   Appetite still ok  Objective:   Vitals:   Temp (24hrs), Av 2 °F (36 8 °C), Min:98 1 °F (36 7 °C), Max:98 3 °F (36 8 °C)    HR:  [64-85] 77  Resp:  [18-20] 18  BP: (134-191)/(66-93) 134/73  SpO2:  [97 %-100 %] 97 %  Body mass index is 30 44 kg/m²  Input and Output Summary (last 24 hours):     No intake or output data in the 24 hours ending 18 0229    Physical Exam:     Physical Exam   Constitutional: She is oriented to person, place, and time  She appears well-developed and well-nourished  No distress  HENT:   Head: Normocephalic and atraumatic  Right Ear: External ear normal    Left Ear: External ear normal    Nose: Nose normal    Mouth/Throat: Oropharynx is clear and moist  No oropharyngeal exudate  Eyes: Conjunctivae and EOM are normal  Pupils are equal, round, and reactive to light  Right eye exhibits no discharge  Left eye exhibits no discharge  No scleral icterus  Neck: Normal range of motion  Neck supple  No JVD present  No thyromegaly present  Cardiovascular: Normal rate, regular rhythm, normal heart sounds and intact distal pulses  Exam reveals no gallop and no friction rub  No murmur heard  Pulmonary/Chest: Effort normal and breath sounds normal  No respiratory distress  She has no wheezes  She has no rales  Abdominal: Soft  Bowel sounds are normal  She exhibits no distension  There is no tenderness  There is no rebound and no guarding  Musculoskeletal: Normal range of motion  She exhibits no edema or deformity  Lymphadenopathy:     She has no cervical adenopathy  Neurological: She is alert and oriented to person, place, and time  She has normal reflexes  No cranial nerve deficit   She exhibits normal muscle tone  Skin: Skin is warm and dry  No rash noted  She is not diaphoretic  No erythema  Psychiatric: She has a normal mood and affect  Vitals reviewed  Additional Data:   Labs:    Results from last 7 days  Lab Units 07/06/18  0636 07/05/18  0803  07/03/18  0540   WBC Thousand/uL 15 43* 10 91*  < > 14 27*   HEMOGLOBIN g/dL 12 5 12 2  < > 11 3*   HEMATOCRIT % 37 8 37 2  < > 34 2*   PLATELETS Thousands/uL 354 344  < > 271   NEUTROS PCT %  --   --   --  73   LYMPHS PCT %  --   --   --  14   LYMPHO PCT %  --  19  < >  --    MONOS PCT %  --   --   --  11   MONO PCT MAN %  --  6  < >  --    EOS PCT %  --   --   --  1   EOSINO PCT MANUAL %  --  1  < >  --    < > = values in this interval not displayed  Results from last 7 days  Lab Units 07/06/18  0640  06/30/18  1917   SODIUM mmol/L 139  < > 137   POTASSIUM mmol/L 3 8  < > 4 4   CHLORIDE mmol/L 103  < > 100   CO2 mmol/L 28  < > 34*   BUN mg/dL 19  < > 18   CREATININE mg/dL 0 98  < > 0 77   CALCIUM mg/dL 9 3  < > 9 4   TOTAL PROTEIN g/dL  --   --  7 9   BILIRUBIN TOTAL mg/dL  --   --  0 40   ALK PHOS U/L  --   --  69   ALT U/L  --   --  28   AST U/L  --   --  31   GLUCOSE RANDOM mg/dL 136  < > 112   < > = values in this interval not displayed  Results from last 7 days  Lab Units 07/06/18  1219   INR  0 98       * I Have Reviewed All Lab Data Listed Above  * Additional Pertinent Lab Tests Reviewed: All Labs Within Last 24 Hours Reviewed    Imaging:    Imaging Reports Reviewed Today Include: none    Cultures:   Blood Culture:   Lab Results   Component Value Date    BLOODCX No Growth After 5 Days  07/01/2018    BLOODCX No Growth After 5 Days   07/01/2018     Urine Culture:   Lab Results   Component Value Date    URINECX <10,000 cfu/ml Gram Negative Jas (A) 06/30/2018     Sputum Culture: No components found for: SPUTUMCX  Wound Culture: No results found for: WOUNDCULT    Last 24 Hours Medication List:     Current Facility-Administered Medications:  acetaminophen 650 mg Oral Q6H PRN Bryanna Edmonds PA-C    amLODIPine 5 mg Oral Daily DEYSI Winston    enoxaparin 40 mg Subcutaneous Daily Sophy Mensah MD    hydrochlorothiazide 12 5 mg Oral Daily DEYSI Winston    insulin lispro 1-5 Units Subcutaneous TID AC Bryanna Edmonds PA-C    lisinopril 40 mg Oral Daily DEYSI Winston    metoprolol succinate 100 mg Oral Daily DEYSI Winston    ondansetron 4 mg Intravenous Q6H PRN Fabricio Monterroso MD    oxyCODONE-acetaminophen 1 tablet Oral Q4H PRN Agatha Barillas PA-C    piperacillin-tazobactam 3 375 g Intravenous Q6H Fabricio Monterroso MD Last Rate: 3 375 g (07/06/18 7727)   pravastatin 20 mg Oral Daily DEYSI Winston    sodium chloride 1,000 mL Intravenous Once Ubaldo Bui DO    sodium chloride 75 mL/hr Intravenous Continuous Bryanna Edmonds PA-C Last Rate: 75 mL/hr (07/06/18 1104)        Today, Patient Was Seen By: Brennan Rubinstein, MD    ** Please Note: Dragon 360 Dictation voice to text software may have been used in the creation of this document   **

## 2018-07-07 NOTE — PROGRESS NOTES
Progress Note - Angie Johnson 81 y o  female MRN: [id number]    Unit/Bed#: -Antonio Encounter: [id number]      Assessment:  Perforated appendicitis with abscess  Status post IR aspiration    Plan:  Transition to p o  antibiotics  Monitor patient for tolerance  Recheck CBC in a m  Patient tolerates oral antibiotics and no evidence of uncontrolled infection plan to discharge tomorrow  Subjective:   Patient tolerated IR procedure well  Tolerating diet  No change, no abdominal pain  Objective:     Vitals: Blood pressure 160/70, pulse 70, temperature 98 °F (36 7 °C), temperature source Oral, resp  rate 18, height 5' 2" (1 575 m), weight 75 5 kg (166 lb 7 2 oz), SpO2 99 %  ,Body mass index is 30 44 kg/m²        Intake/Output Summary (Last 24 hours) at 07/07/18 1016  Last data filed at 07/07/18 0908   Gross per 24 hour   Intake              360 ml   Output              200 ml   Net              160 ml       Physical Exam:  Awake alert oriented no acute distress  Abdomen is soft obese nontender     Invasive Devices     Peripheral Intravenous Line            Peripheral IV 07/06/18 Right Forearm less than 1 day                Lab, Imaging and other studies: Leukocytosis 12 from 14

## 2018-07-07 NOTE — ASSESSMENT & PLAN NOTE
Complex 4 8 cm right lower quadrant collection containing gas, fluid as well as peripheral enhancement and a large rounded internal calcification thought to most likely represent an appendicolith in a periappendiceal abscess from a ruptured   appendicitis   Less likely differential diagnosis includes perforated malignancy and associated abscess  General Surgery Manging  Pain improved tolerating diet, WBC went back up so IR drained today  Diet per surgery

## 2018-07-07 NOTE — ASSESSMENT & PLAN NOTE
Complex 4 8 cm right lower quadrant collection containing gas, fluid as well as peripheral enhancement and a large rounded internal calcification thought to most likely represent an appendicolith in a periappendiceal abscess from a ruptured   appendicitis  [facility] improving  General Surgery Managing  Cont IV antibiotic  · CT showing persistent abcess with appendolith and inflammatory changes  Pt S/p drainage by IR   Comfortable no complaints

## 2018-07-08 VITALS
WEIGHT: 166.45 LBS | DIASTOLIC BLOOD PRESSURE: 72 MMHG | SYSTOLIC BLOOD PRESSURE: 132 MMHG | OXYGEN SATURATION: 96 % | TEMPERATURE: 98.4 F | HEART RATE: 85 BPM | BODY MASS INDEX: 30.63 KG/M2 | RESPIRATION RATE: 18 BRPM | HEIGHT: 62 IN

## 2018-07-08 LAB
BASOPHILS # BLD AUTO: 0.04 THOUSANDS/ΜL (ref 0–0.1)
BASOPHILS NFR BLD AUTO: 0 % (ref 0–1)
EOSINOPHIL # BLD AUTO: 0.2 THOUSAND/ΜL (ref 0–0.61)
EOSINOPHIL NFR BLD AUTO: 2 % (ref 0–6)
ERYTHROCYTE [DISTWIDTH] IN BLOOD BY AUTOMATED COUNT: 14.5 % (ref 11.6–15.1)
GLUCOSE SERPL-MCNC: 121 MG/DL (ref 65–140)
GLUCOSE SERPL-MCNC: 121 MG/DL (ref 65–140)
GLUCOSE SERPL-MCNC: 127 MG/DL (ref 65–140)
HCT VFR BLD AUTO: 37.8 % (ref 34.8–46.1)
HGB BLD-MCNC: 12.4 G/DL (ref 11.5–15.4)
IMM GRANULOCYTES # BLD AUTO: 0.17 THOUSAND/UL (ref 0–0.2)
IMM GRANULOCYTES NFR BLD AUTO: 1 % (ref 0–2)
LYMPHOCYTES # BLD AUTO: 1.93 THOUSANDS/ΜL (ref 0.6–4.47)
LYMPHOCYTES NFR BLD AUTO: 14 % (ref 14–44)
MCH RBC QN AUTO: 25.5 PG (ref 26.8–34.3)
MCHC RBC AUTO-ENTMCNC: 32.8 G/DL (ref 31.4–37.4)
MCV RBC AUTO: 78 FL (ref 82–98)
MONOCYTES # BLD AUTO: 0.84 THOUSAND/ΜL (ref 0.17–1.22)
MONOCYTES NFR BLD AUTO: 6 % (ref 4–12)
NEUTROPHILS # BLD AUTO: 10.52 THOUSANDS/ΜL (ref 1.85–7.62)
NEUTS SEG NFR BLD AUTO: 77 % (ref 43–75)
NRBC BLD AUTO-RTO: 0 /100 WBCS
PLATELET # BLD AUTO: 392 THOUSANDS/UL (ref 149–390)
PMV BLD AUTO: 8.3 FL (ref 8.9–12.7)
RBC # BLD AUTO: 4.86 MILLION/UL (ref 3.81–5.12)
WBC # BLD AUTO: 13.7 THOUSAND/UL (ref 4.31–10.16)

## 2018-07-08 PROCEDURE — 99232 SBSQ HOSP IP/OBS MODERATE 35: CPT | Performed by: GENERAL PRACTICE

## 2018-07-08 PROCEDURE — 82948 REAGENT STRIP/BLOOD GLUCOSE: CPT

## 2018-07-08 PROCEDURE — 85025 COMPLETE CBC W/AUTO DIFF WBC: CPT | Performed by: SURGERY

## 2018-07-08 RX ORDER — AMOXICILLIN AND CLAVULANATE POTASSIUM 875; 125 MG/1; MG/1
1 TABLET, FILM COATED ORAL EVERY 12 HOURS SCHEDULED
Qty: 28 TABLET | Refills: 0 | Status: SHIPPED | OUTPATIENT
Start: 2018-07-08 | End: 2018-07-08

## 2018-07-08 RX ORDER — AMOXICILLIN AND CLAVULANATE POTASSIUM 875; 125 MG/1; MG/1
1 TABLET, FILM COATED ORAL EVERY 12 HOURS SCHEDULED
Qty: 28 TABLET | Refills: 0 | Status: SHIPPED | OUTPATIENT
Start: 2018-07-08 | End: 2018-07-22

## 2018-07-08 RX ORDER — PANTOPRAZOLE SODIUM 40 MG/1
40 TABLET, DELAYED RELEASE ORAL
Status: DISCONTINUED | OUTPATIENT
Start: 2018-07-08 | End: 2018-07-08 | Stop reason: HOSPADM

## 2018-07-08 RX ORDER — PANTOPRAZOLE SODIUM 40 MG/1
40 TABLET, DELAYED RELEASE ORAL
Qty: 30 TABLET | Refills: 0 | Status: SHIPPED | OUTPATIENT
Start: 2018-07-08 | End: 2019-04-15

## 2018-07-08 RX ADMIN — LISINOPRIL 40 MG: 20 TABLET ORAL at 09:00

## 2018-07-08 RX ADMIN — HYDROCHLOROTHIAZIDE 12.5 MG: 12.5 TABLET ORAL at 09:01

## 2018-07-08 RX ADMIN — PANTOPRAZOLE SODIUM 40 MG: 40 TABLET, DELAYED RELEASE ORAL at 14:10

## 2018-07-08 RX ADMIN — AMLODIPINE BESYLATE 5 MG: 5 TABLET ORAL at 09:01

## 2018-07-08 RX ADMIN — METOPROLOL SUCCINATE 100 MG: 100 TABLET, EXTENDED RELEASE ORAL at 09:01

## 2018-07-08 RX ADMIN — AMOXICILLIN AND CLAVULANATE POTASSIUM 1 TABLET: 875; 125 TABLET, FILM COATED ORAL at 09:00

## 2018-07-08 RX ADMIN — ACETAMINOPHEN 650 MG: 325 TABLET, FILM COATED ORAL at 03:21

## 2018-07-08 RX ADMIN — ENOXAPARIN SODIUM 40 MG: 40 INJECTION SUBCUTANEOUS at 09:00

## 2018-07-08 NOTE — PLAN OF CARE
Problem: Potential for Falls  Goal: Patient will remain free of falls  INTERVENTIONS:  - Assess patient frequently for physical needs  -  Identify cognitive and physical deficits and behaviors that affect risk of falls  -  [place] fall precautions as indicated by assessment   - Educate patient/family on patient safety including physical limitations  - Instruct patient to call for assistance with activity based on assessment  - Modify environment to reduce risk of injury  - Consider OT/PT consult to assist with strengthening/mobility    Outcome: Progressing      Problem: SAFETY ADULT  Goal: Patient will remain free of falls  INTERVENTIONS:  - Assess patient frequently for physical needs  -  Identify cognitive and physical deficits and behaviors that affect risk of falls    -  [place] fall precautions as indicated by assessment   - Educate patient/family on patient safety including physical limitations  - Instruct patient to call for assistance with activity based on assessment  - Modify environment to reduce risk of injury  - Consider OT/PT consult to assist with strengthening/mobility    Outcome: Progressing

## 2018-07-08 NOTE — DISCHARGE SUMMARY
Discharge Summary - Dejon Singer 81 y o  female MRN: [id number]    Unit/Bed#: -01 Encounter: [id number]    Admission Date:   Admission Orders     Ordered        06/30/18 2058  Inpatient Admission (expected length of stay for this patient is greater than two midnights)  Once               Admitting Diagnosis: Back pain [M54 9]  Perforated appendicitis [K35 2]    HPI:  Patient presenting with pain lower abdomen of 5 days duration  Some associated nausea vomiting  Initially evaluated [facility]  CT scan at that time showed perforated appendicitis with abscess formation  Given the duration symptoms as well as CT findings plan was for non operative conservative management  Procedures Performed: No orders of the defined types were placed in this encounter  Summary of Hospital Course:  Patient was admitted initially maintained on NPO IV fluids and IV antibiotics  Abdominal pain resolved  Diet was advanced with no change in abdominal symptoms  Positive bowel function  Patient's white count slowly decreased from admission  Initially had intermittent fevers on date of discharge no febrile episodes for several days  Initial IR evaluation showed fluid collection not amenable to drainage  Repeat CT showed persistent fluid collection and patient underwent IR aspiration unamenable to drain placement  Patient was transitioned to oral antibiotics no significant change in symptoms  Patient discharged home to continue course of oral antibiotics  Follow up in the office in the next 1-2 weeks with discussion for likely interval appendectomy      Significant Findings, Care, Treatment and Services Provided:  Perforated appendicitis with abscess    Complications: None    Discharge Diagnosis:  Perforated appendicitis with abscess    Resolved Problems  Date Reviewed: 7/2/2018          Resolved    Hypokalemia 7/7/2018     Resolved by  Amandeep Barba MD          Condition at Discharge: good Discharge instructions/Information to patient and family:   See after visit summary for information provided to patient and family  Provisions for Follow-Up Care:  See after visit summary for information related to follow-up care and any pertinent home health orders  PCP: Vijaya Ballesteros MD    Disposition: Home    Planned Readmission: No    Discharge Statement   I spent 30 minutes discharging the patient  This time was spent on the day of discharge  I had direct contact with the patient on the day of discharge  Additional documentation is required if more than 30 minutes were spent on discharge  Discharge Medications:  See after visit summary for reconciled discharge medications provided to patient and family

## 2018-07-08 NOTE — ASSESSMENT & PLAN NOTE
Complex 4 8 cm right lower quadrant collection containing gas, fluid as well as peripheral enhancement and a large rounded internal calcification thought to most likely represent an appendicolith in a periappendiceal abscess from a ruptured   appendicitis   Less likely differential diagnosis includes perforated malignancy and associated abscess  General Surgery Manging  Pain improved tolerating diet, WBC went back up so IR drained 7/6  Diet per surgery

## 2018-07-08 NOTE — PROGRESS NOTES
[facility] Internal Medicine Progress Note  Patient: Gabby Huerta 81 y o  female   MRN: [id number]  PCP: Duglas Quintana MD  Unit/Bed#: [facility] Encounter: [id number]  Date Of Visit: 07/08/18    Assessment:    Principal Problem:    Perforated appendicitis  Active Problems:    Type 2 diabetes mellitus without complication, without long-term current use of insulin (HCC)    Mixed hyperlipidemia    Abnormal TSH    Leukocytosis    Essential hypertension    Back pain    Periumbilical abdominal pain    Sepsis ([address] )      Plan:  Perforated appendicitis   Assessment & Plan     Complex 4 8 cm right lower quadrant collection containing gas, fluid as well as peripheral enhancement and a large rounded internal calcification thought to most likely represent an appendicolith in a periappendiceal abscess from a ruptured appendicitis  Persistent leucocytosis  General Surgery Managing  Cont IV antibiotic-zosyn d/c'd on augmentin  · CT showing persistent abcess with appendolith and inflammatory changes  S/p drainage by IR  Mild crampy pain relieved with tylenol this am   Plan per surgery  · Body fluid culture pending from abscess          Periumbilical abdominal pain   Assessment & Plan     Complex 4 8 cm right lower quadrant collection containing gas, fluid as well as peripheral enhancement and a large rounded internal calcification thought to most likely represent an appendicolith in a periappendiceal abscess from a ruptured   appendicitis   Less likely differential diagnosis includes perforated malignancy and associated abscess  General Surgery Manging  Pain improved tolerating diet, WBC went back up so IR drained 7/6  Diet per surgery           Leukocytosis   Assessment & Plan     In the setting of acute appendicitis with her  Manage by General surgery    Monitor WBC and temp -wbc 13; presently on Augmentin; zosyn discontinued  Blood cultures no growth      D/c planning per general surgery           Type 2 diabetes mellitus without complication, without long-term current use of insulin [facility])   Assessment & Plan             Lab Results   Component Value Date     HGBA1C 6 2 06/05/2018                Recent Labs      07/07/18   0535  07/07/18   1113  07/07/18   1606  07/07/18   2111   POCGLU  112  178*  102  122      Hold Metformin  SSI, monitor BS Q 6 hours  Low Carb Diet       Blood Sugar Average: Last 72 hrs:  (P) 144 [id number]   Cont SSI  Resume metformin on discharge          Mixed hyperlipidemia   Assessment & Plan     Cont Statin          Essential hypertension   Assessment & Plan     stable  Continue home regimen of metoprolol, lisinopril, Norvasc and hydrochlorothiazide  Will cover with p r n  hydralazine at this time  Continue monitor, intermittent spikes usually  Associated with fever  Have improved              Abnormal TSH   Assessment & Plan     7/6 tsh low and has been low with normal free t4 and normal T3          Back pain   Assessment & Plan     Right lower back  Secondary to Mild right hydroureteronephrosis likely secondary to the distal right ureter being enveloped by portions of the right lower quadrant abscess  General surgery managing  Pain improved   Cr normal on 7/7/18           Sepsis ([address] )   Assessment & Plan     Sepsis present on admission as evidenced by PTA fever, tachycardia, source for infection and leukocytosis  Has been stable on conservative treatment , now s/p IR drainage   Monitor for regression  BC negative 7//18  Body fluid culture from abscess pending                VTE Pharmacologic Prophylaxis:   Pharmacologic: Enoxaparin (Lovenox)  Mechanical VTE Prophylaxis in Place: Yes    Patient Centered Rounds: I have performed bedside rounds with nursing staff today  Discussions with Specialists or Other Care Team Provider:     Education and Discussions with Family / Patient:     Time Spent for Care: 30 minutes    More than 50% of total time spent on counseling and coordination of care as described above  Current Length of Stay: 8 day(s)    Current Patient Status: Inpatient   Certification Statement: The patient will continue to require additional inpatient hospital stay due to appendicial abscess    Discharge Plan: home    Code Status: Level 1 - Full Code      Subjective:   Denies abdominal pain    Objective:     Vitals:   Temp (24hrs), Av 2 °F (36 8 °C), Min:98 1 °F (36 7 °C), Max:98 4 °F (36 9 °C)    HR:  [66-85] 85  Resp:  [18] 18  BP: (132-192)/(72-84) 132/72  SpO2:  [96 %-99 %] 96 %  Body mass index is 30 44 kg/m²  Input and Output Summary (last 24 hours): Intake/Output Summary (Last 24 hours) at 18 0858  Last data filed at 18 1700   Gross per 24 hour   Intake              600 ml   Output              200 ml   Net              400 ml       Physical Exam:     Physical Exam   Constitutional: She is oriented to person, place, and time  She appears well-developed and well-nourished  HENT:   Head: Normocephalic and atraumatic  Eyes: Pupils are equal, round, and reactive to light  Cardiovascular: Normal rate and regular rhythm  Pulmonary/Chest: Effort normal and breath sounds normal    Abdominal: Soft  She exhibits no distension  There is no tenderness  There is no guarding  Musculoskeletal: She exhibits no edema  Neurological: She is alert and oriented to person, place, and time         Additional Data:     Labs:      Results from last 7 days  Lab Units 18  0451   WBC Thousand/uL 13 70*   HEMOGLOBIN g/dL 12 4   HEMATOCRIT % 37 8   PLATELETS Thousands/uL 392*   NEUTROS PCT % 77*   LYMPHS PCT % 14   MONOS PCT % 6   EOS PCT % 2       Results from last 7 days  Lab Units 18  0453   SODIUM mmol/L 140   POTASSIUM mmol/L 3 5   CHLORIDE mmol/L 106   CO2 mmol/L 27   BUN mg/dL 17   CREATININE mg/dL 0 83   CALCIUM mg/dL 8 8   GLUCOSE RANDOM mg/dL 123       Results from last 7 days  Lab Units 18  1219   INR  0 98       * I Have Reviewed All Lab Data Listed Above  * Additional Pertinent Lab Tests Reviewed: All Labs Within Last 24 Hours Reviewed    Imaging:    Imaging Reports Reviewed Today Include:   Imaging Personally Reviewed by Myself Includes:      Recent Cultures (last 7 days):       Results from last 7 days  Lab Units 07/06/18  1406   GRAM STAIN RESULT  2+ Polys  Rare Gram positive cocci in pairs   BODY FLUID CULTURE, STERILE  No growth       Last 24 Hours Medication List:     Current Facility-Administered Medications:  acetaminophen 650 mg Oral Q6H PRN Bryanna Edmonds PA-C   amLODIPine 5 mg Oral Daily DEYSI Winston   amoxicillin-clavulanate 1 tablet Oral Q12H [address] Marli Pepper MD   enoxaparin 40 mg Subcutaneous Daily Marli Pepper MD   hydrochlorothiazide 12 5 mg Oral Daily DEYSI Winston   insulin lispro 1-5 Units Subcutaneous TID AC Bryanna Edmonds PA-C   lisinopril 40 mg Oral Daily DEYSI Winston   metoprolol succinate 100 mg Oral Daily DEYSI Winston   ondansetron 4 mg Intravenous Q6H PRN Marli Pepper MD   oxyCODONE-acetaminophen 1 tablet Oral Q4H PRN Lovely Barillas PA-C   pravastatin 20 mg Oral Daily DEYSI Winston   sodium chloride 1,000 mL Intravenous Once Allegra Brannon DO        Today, Patient Was Seen By: Ramón Cannon MD    ** Please Note: Dragon 360 Dictation voice to text software may have been used in the creation of this document   **

## 2018-07-08 NOTE — ASSESSMENT & PLAN NOTE
Complex 4 8 cm right lower quadrant collection containing gas, fluid as well as peripheral enhancement and a large rounded internal calcification thought to most likely represent an appendicolith in a periappendiceal abscess from a ruptured   appendicitis  [facility] improving  General Surgery Managing  Cont IV antibiotic  · CT showing persistent abcess with appendolith and inflammatory changes  Pt S/p drainage by IR   Mild crampy pain relieved with tylenol this am   Plan per surgery

## 2018-07-08 NOTE — ASSESSMENT & PLAN NOTE
In the setting of acute appendicitis with her  No IR Candidate for draining  Patient does not meet SIRS criteria  Manage by General surgery  Monitor WBC and temp  Blood cultures  Continue Zosyn IV  Improved WBC count after drainage of appendix   Possible Dc in am per surgery on oral abx

## 2018-07-08 NOTE — SOCIAL WORK
CM met with pt at bedside  Pt to be discharged home with daughter Naila Cardenas transporting  Pt refusing [facility] services  IMM reviewed and signed  Copy to pt and copy to MR for scanning

## 2018-07-08 NOTE — ASSESSMENT & PLAN NOTE
Sepsis present on admission as evidenced by PTA fever, tachycardia, source for infection and leukocytosis  Has been stable on conservative treatment , now s/p IR drainage   Monitor for regression

## 2018-07-08 NOTE — PROGRESS NOTES
Progress Note - Sarah Wilhelm 1935, 81 y o  female MRN: [id number]    Unit/Bed#: -Antonio Encounter: [id number]    Primary Care Provider: Lenora Butler MD   Date and time admitted to hospital: 6/30/2018  6:30 PM        * Perforated appendicitis   Assessment & Plan    Complex 4 8 cm right lower quadrant collection containing gas, fluid as well as peripheral enhancement and a large rounded internal calcification thought to most likely represent an appendicolith in a periappendiceal abscess from a ruptured   appendicitis  [facility] improving  General Surgery Managing  Cont IV antibiotic  · CT showing persistent abcess with appendolith and inflammatory changes  Pt S/p drainage by IR  Mild crampy pain relieved with tylenol this am   Plan per surgery        Periumbilical abdominal pain   Assessment & Plan    Complex 4 8 cm right lower quadrant collection containing gas, fluid as well as peripheral enhancement and a large rounded internal calcification thought to most likely represent an appendicolith in a periappendiceal abscess from a ruptured   appendicitis   Less likely differential diagnosis includes perforated malignancy and associated abscess  General Surgery Manging  Pain improved tolerating diet, WBC went back up so IR drained 7/6  Diet per surgery  Leukocytosis   Assessment & Plan    In the setting of acute appendicitis with her  No IR Candidate for draining  Patient does not meet SIRS criteria  Manage by General surgery  Monitor WBC and temp  Blood cultures  Continue Zosyn IV  Improved WBC count after drainage of appendix  Possible Dc in am per surgery on oral abx          Type 2 diabetes mellitus without complication, without long-term current use of insulin [facility])   Assessment & Plan    Lab Results   Component Value Date    HGBA1C 6 2 06/05/2018       Recent Labs      07/07/18   0535  07/07/18   1113  07/07/18   1606  07/07/18   2111   POCGLU  112  178*  102  122     Hold Metformin    SSI, monitor BS Q 6 hours  Low Carb Diet  Blood Sugar Average: Last 72 hrs:  (P) 144 [id number]   Cont SSI  Resume metformin on discharge        Mixed hyperlipidemia   Assessment & Plan    Cont Statin        Essential hypertension   Assessment & Plan    stable  Continue home regimen of metoprolol, lisinopril, Norvasc and hydrochlorothiazide  Will cover with p r n  hydralazine at this time  Continue monitor, intermittent spikes usually  Associated with fever  Have improved  Abnormal TSH   Assessment & Plan    Recheck with am labs as outpatient possible euthyroid sick syndrome        Back pain   Assessment & Plan    Right lower back  Secondary to Mild right hydroureteronephrosis likely secondary to the distal right ureter being enveloped by portions of the right lower quadrant abscess  General surgery managing  Pain improved  CT scan shows persistent abcess with right hydroureter  Creatinine has been stable   IR drained this am   May need surveillance imaging  For now monitor CRT  Low threshold for urology input  Hopefully, will decompress now that adjacent abscess drained  Sepsis [facility])   Assessment & Plan    Sepsis present on admission as evidenced by PTA fever, tachycardia, source for infection and leukocytosis  Has been stable on conservative treatment , now s/p IR drainage   Monitor for regression  VTE Pharmacologic Prophylaxis:   Pharmacologic: Enoxaparin (Lovenox)  Mechanical: Mechanical VTE prophylaxis in place  Patient Centered Rounds: walking rounds  Discussions with Specialists or Other Care Team Provider: none  Education and Discussions with Family / Patient: none  Time Spent for Care: 25 min    If More than 50% of total time spent on counseling and coordination of care as described above indicate yes or no and described the counseling and coordination:no    Current Length of Stay: 7 day(s)  Current Patient Status: Inpatient   Certification Statement: The patient will continue to require additional inpatient hospital stay due to monitoring post ir drainage of appendix abcess    Discharge Plan: tbd  Code Status: Level 1 - Full Code    Subjective:   Patient doing ok but had some crampy abdomen pain, possible anticipation of bm  Resolved with tylenol    Objective:   Vitals:   Temp (24hrs), Av 1 °F (36 7 °C), Min:98 °F (36 7 °C), Max:98 1 °F (36 7 °C)    HR:  [66-70] 66  Resp:  [18] 18  BP: (142-192)/(70-84) 165/82  SpO2:  [98 %-99 %] 98 %  Body mass index is 30 44 kg/m²  Input and Output Summary (last 24 hours): Intake/Output Summary (Last 24 hours) at 18 0537  Last data filed at 18 1700   Gross per 24 hour   Intake              600 ml   Output              200 ml   Net              400 ml       Physical Exam:     Physical Exam   Constitutional: She is oriented to person, place, and time  She appears well-developed and well-nourished  No distress  HENT:   Head: Normocephalic and atraumatic  Right Ear: External ear normal    Left Ear: External ear normal    Nose: Nose normal    Mouth/Throat: Oropharynx is clear and moist  No oropharyngeal exudate  Eyes: Conjunctivae and EOM are normal  Pupils are equal, round, and reactive to light  Right eye exhibits no discharge  Left eye exhibits no discharge  No scleral icterus  Neck: Normal range of motion  Neck supple  No JVD present  No thyromegaly present  Cardiovascular: Normal rate, regular rhythm, normal heart sounds and intact distal pulses  Exam reveals no gallop and no friction rub  No murmur heard  Pulmonary/Chest: Effort normal and breath sounds normal  No respiratory distress  She has no wheezes  She has no rales  Abdominal: Soft  Bowel sounds are normal  She exhibits no distension  There is no tenderness  There is no rebound and no guarding  Musculoskeletal: Normal range of motion  She exhibits no edema or deformity  Lymphadenopathy:     She has no cervical adenopathy     Neurological: She is alert and oriented to person, place, and time  She has normal reflexes  No cranial nerve deficit  She exhibits normal muscle tone  Skin: Skin is warm and dry  No rash noted  She is not diaphoretic  No erythema  Psychiatric: She has a normal mood and affect  Vitals reviewed  Additional Data:   Labs:    Results from last 7 days  Lab Units 07/07/18  0453   WBC Thousand/uL 12 79*   HEMOGLOBIN g/dL 11 3*   HEMATOCRIT % 34 7*   PLATELETS Thousands/uL 363   NEUTROS PCT % 72   LYMPHS PCT % 16   MONOS PCT % 8   EOS PCT % 2       Results from last 7 days  Lab Units 07/07/18  0453   SODIUM mmol/L 140   POTASSIUM mmol/L 3 5   CHLORIDE mmol/L 106   CO2 mmol/L 27   BUN mg/dL 17   CREATININE mg/dL 0 83   CALCIUM mg/dL 8 8   GLUCOSE RANDOM mg/dL 123       Results from last 7 days  Lab Units 07/06/18  1219   INR  0 98       * I Have Reviewed All Lab Data Listed Above  * Additional Pertinent Lab Tests Reviewed: All Labs Within Last 24 Hours Reviewed    Imaging:    Imaging Reports Reviewed Today Include: none    Cultures:   Blood Culture:   Lab Results   Component Value Date    BLOODCX No Growth After 5 Days  07/01/2018    BLOODCX No Growth After 5 Days   07/01/2018     Urine Culture:   Lab Results   Component Value Date    URINECX <10,000 cfu/ml Gram Negative Jas (A) 06/30/2018     Sputum Culture: No components found for: SPUTUMCX  Wound Culture: No results found for: WOUNDCULT    Last 24 Hours Medication List:     Current Facility-Administered Medications:  acetaminophen 650 mg Oral Q6H PRN Bryanna Edmonds PA-C   amLODIPine 5 mg Oral Daily DEYSI Winston   amoxicillin-clavulanate 1 tablet Oral Q12H [address] Vaca MD   enoxaparin 40 mg Subcutaneous Daily Mel Lane MD   hydrochlorothiazide 12 5 mg Oral Daily DEYSI Winston   insulin lispro 1-5 Units Subcutaneous TID AC Bryanna Edmonds PA-C   lisinopril 40 mg Oral Daily DEYSI Winston   metoprolol succinate 100 mg Oral Daily Liana DEYSI Fitzpatrick   ondansetron 4 mg Intravenous Q6H PRN Sd Woods MD   oxyCODONE-acetaminophen 1 tablet Oral Q4H PRN Chino Barillas PA-C   pravastatin 20 mg Oral Daily Herline DEYSI Fitzpatrick   sodium chloride 1,000 mL Intravenous Once Hayes Navjotstulises, DO        Today, Patient Was Seen By: Yocasta Kennedy MD    ** Please Note: Dragon 360 Dictation voice to text software may have been used in the creation of this document   **

## 2018-07-08 NOTE — ASSESSMENT & PLAN NOTE
Lab Results   Component Value Date    HGBA1C 6 2 06/05/2018       Recent Labs      07/07/18   0535  07/07/18   1113  07/07/18   1606  07/07/18   2111   POCGLU  112  178*  102  122     Hold Metformin  SSI, monitor BS Q 6 hours  Low Carb Diet  Blood Sugar Average: Last 72 hrs:  (P) 144 [id number]   Cont SSI   Resume metformin on discharge

## 2018-07-08 NOTE — PROGRESS NOTES
Progress Note - Dejon Singer 81 y o  female MRN: [id number]    Unit/Bed#: -Antonio Encounter: [id number]      Assessment:  Perforated appendicitis with abscess  Status post IR aspiration    Plan:  Patient tolerating oral antibiotics no recurrence of fevers  Will add Protonix discharge patient on  Stable white count  Discharge home follow up in the office next 1-2 weeks  Caution patient any recurrence of symptoms such as focal right lower quadrant abdominal pain, nausea, vomiting, distension, fever or chills patient should seek immediate re-evaluation  May require emergent intervention if infection unable to be controlled with oral antibiotics  Subjective:   Patient feels well denies lower abdominal pain  Has some upper abdominal pain and reflux  Objective:     Vitals: Blood pressure 132/72, pulse 85, temperature 98 4 °F (36 9 °C), temperature source Oral, resp  rate 18, height 5' 2" (1 575 m), weight 75 5 kg (166 lb 7 2 oz), SpO2 96 %  ,Body mass index is 30 44 kg/m²        Intake/Output Summary (Last 24 hours) at 07/08/18 1158  Last data filed at 07/07/18 1700   Gross per 24 hour   Intake              240 ml   Output                0 ml   Net              240 ml       Physical Exam:  Awake alert oriented no acute distress  Abdomen soft obese nontender

## 2018-07-08 NOTE — PLAN OF CARE
Problem: DISCHARGE PLANNING - CARE MANAGEMENT  Goal: Discharge to post-acute care or home with appropriate resources  INTERVENTIONS:  - Conduct assessment to determine patient/family and health care team treatment goals, and need for post-acute services based on payer coverage, community resources, and patient preferences, and barriers to discharge  - Address psychosocial, clinical, and financial barriers to discharge as identified in assessment in conjunction with the patient/family and health care team  - Arrange appropriate level of post-acute services according to patient's   needs and preference and payer coverage in collaboration with the physician and health care team  - Communicate with and update the patient/family, physician, and health care team regarding progress on the discharge plan  - Arrange appropriate transportation to post-acute venues   Outcome: Completed Date Met: 07/08/18  CM met with pt at bedside  Pt to be discharged home with daughter [place] transporting  Pt refusing [facility] services  IMM reviewed and signed  Copy to pt and copy to MR for scanning

## 2018-07-09 ENCOUNTER — TRANSITIONAL CARE MANAGEMENT (OUTPATIENT)
Dept: INTERNAL MEDICINE CLINIC | Facility: CLINIC | Age: 83
End: 2018-07-09

## 2018-07-11 LAB
BACTERIA SPEC BFLD CULT: ABNORMAL
GRAM STN SPEC: ABNORMAL
GRAM STN SPEC: ABNORMAL

## 2018-07-18 ENCOUNTER — OFFICE VISIT (OUTPATIENT)
Dept: SURGERY | Facility: CLINIC | Age: 83
End: 2018-07-18
Payer: MEDICARE

## 2018-07-18 VITALS
HEART RATE: 72 BPM | SYSTOLIC BLOOD PRESSURE: 132 MMHG | TEMPERATURE: 99 F | HEIGHT: 62 IN | RESPIRATION RATE: 16 BRPM | DIASTOLIC BLOOD PRESSURE: 80 MMHG | WEIGHT: 166 LBS | BODY MASS INDEX: 30.55 KG/M2

## 2018-07-18 DIAGNOSIS — K35.32 PERFORATED APPENDICITIS: Primary | ICD-10-CM

## 2018-07-18 PROCEDURE — 99214 OFFICE O/P EST MOD 30 MIN: CPT | Performed by: SURGERY

## 2018-07-18 NOTE — PROGRESS NOTES
GENERAL SURGERY HISTORY AND PHYSICAL     Nadia Cheung 81 y o  female MRN: [id number]  Unit/Bed#:  Encounter: [id number]      Assessment/Plan   1  Perforated appendicitis       Patient with perforated appendicitis with abscess formation  Patient clinically has resolved with only loose bowel movements only symptom remaining  I discussed with the patient with family member present options at this time  First option is to proceed with interval appendectomy  I discussed with him that the patient would be at risk for operative complication including injury to surrounding structures of the appendix  Patient's age also puts her at risk for perioperative complications  The alternative option in this case would be watchful waiting with plan for surgical intervention with any evidence of recurrent appendicitis  I discussed with them that risk of recurrent appendicitis is low, but the other consideration to keep in mind is that in appendiceal malignancy could have precipitated the appendicitis, and would be more common in an elderly patient such as her  Also discussed that her diabetes could possibly mask symptoms of an involving intra-abdominal infectious process delaying any intervention  I made them aware there is a little significant literature on the subject, my recent review showed predominantly small case studies in retrospective nature  Majority of the studies involved adult patient's of all ages, also limiting its epic ability to an 81-year-old  At this time patient is reluctant to proceed with any surgical intervention  She is concerned about perioperative complications expression L&I regarding her age  Will have the patient complete antibiotics and re-presented in 2 weeks  If she is having persistent symptoms of any kind I would recommend at a minimum repeat imaging with likely surgical intervention if there is a persistent infectious process    If she is still symptom free and her bowel movements return to normal and she wishes to avoid any further surgical intervention would proceed with close observation  Discussed with her if there is any acute change to present for sooner evaluation  Chief Complaint perforated appendicitis    HPI: Ruddy Nicholson is a 81y o  year old female who presents after inpatient admission for perforated appendicitis with abscess  Patient had 5 days of symptoms prior to presentation  Initial CT scan showed perforated appendicitis  with fecalith and abscess formation  Patient was initially started with IV antibiotics maintained NP O  IR was consulted, did not feel fluid collection was amenable to drainage at that time  Patient's leukocytosis improved, abdominal pain resolved  Repeat imaging showed slight decrease in fluid collection, patient underwent IR aspiration  At the date of discharge patient's leukocytosis at decreased was afebrile had no abdominal pain  Patient was discharged home on a course of oral antibiotics which she is still taking  Patient states she has had no abdominal pain since discharge denies fevers or chills  Has a slight decrease in her appetite but denies nausea or vomiting  Patient states bowel movements are loose but denies scar frequent diarrhea  Patient denies any other acute changes  ROS:  12 Point ROS reviewed and negative except for:  Loose bowel movements    Historical Information   Past Medical History:   Diagnosis Date    Diabetes mellitus ([address] )     GERD (gastroesophageal reflux disease)     Hyperlipidemia     Hypertension      Past Surgical History:   Procedure Laterality Date     SECTION      CHOLECYSTECTOMY      NECK SURGERY      [place] COLONOSCOPY FLX DX W/COLLJ SPEC WHEN PFRMD N/A 3/1/2018    Procedure: COLONOSCOPY;  Surgeon: Kati Gill MD;  Location: [facility];   Service: Gastroenterology     Social History   History   Alcohol Use No     History   Drug Use No     History   Smoking Status    Never Smoker   Smokeless Tobacco    Never Used     Family History: no pertinent family history  No Known Allergies  Meds/Allergies   all current active meds have been reviewed      Objective   Vitals: Blood pressure 132/80, pulse 72, temperature 99 °F (37 2 °C), temperature source Oral, resp  rate 16, height 5' 2" (1 575 m), weight 75 3 kg (166 lb), not currently breastfeeding  ,Body mass index is 30 36 kg/m²    Physical Exam:    General appearance: Awake alert oriented no acute distress  HEENT: Sclera clear, anicterus, no discharge  Neck: Trachea is midline  Lungs: No respiratory distress, clear to auscultation bilaterally  Heart[de-identified] RRR  Abdomen: soft, nondistended, nontender  Psych: Affect appropriate    Polo Serna MD  7/18/2018

## 2018-07-19 ENCOUNTER — OFFICE VISIT (OUTPATIENT)
Dept: INTERNAL MEDICINE CLINIC | Facility: CLINIC | Age: 83
End: 2018-07-19
Payer: MEDICARE

## 2018-07-19 VITALS
BODY MASS INDEX: 29.38 KG/M2 | HEART RATE: 77 BPM | DIASTOLIC BLOOD PRESSURE: 74 MMHG | HEIGHT: 61 IN | WEIGHT: 155.6 LBS | SYSTOLIC BLOOD PRESSURE: 132 MMHG | OXYGEN SATURATION: 98 % | TEMPERATURE: 99 F

## 2018-07-19 DIAGNOSIS — K35.32 PERFORATED APPENDICITIS: Primary | ICD-10-CM

## 2018-07-19 PROCEDURE — 99495 TRANSJ CARE MGMT MOD F2F 14D: CPT | Performed by: PHYSICIAN ASSISTANT

## 2018-07-19 NOTE — PROGRESS NOTES
Assessment/Plan:       Hospital follow-up status post treatment for perforated appendix  Surgery was not performed at the time in the hospital   The patient will follow up again with her surgeon  She saw him yesterday  She will decide about a future appendectomy  She is instructed that if abdominal pain or fevers return she needs to notify the surgeon immediately    No problem-specific Assessment & Plan notes found for this encounter  There are no diagnoses linked to this encounter  Subjective:     Patient ID: Nadia Cheung is a 81 y o  female  Patient comes in for hospital follow-up  She was admitted to [facility] [place] on June 30th and discharged on July 8  Patient was followed and treated for a perforated appendix  She did not have an  Appendectomy at the time  They were treating the infection and going to follow up as an outpatient for a possible future appendectomy  She saw her surgeon yesterday  They want her to finish the antibiotics and follow up with them again to re-evaluate  The patient is hesitant to have elective surgery due to her advanced age  She is otherwise doing well and has no further abdominal pain or fevers  Review of Systems   Constitutional: Negative for appetite change, chills and fever  Respiratory: Negative for cough, chest tightness and shortness of breath  Cardiovascular: Negative for chest pain and palpitations  Gastrointestinal: Negative for abdominal pain, diarrhea and nausea  Objective:     Physical Exam   Constitutional: She appears well-developed and well-nourished  HENT:   Head: Normocephalic and atraumatic  Right Ear: External ear normal    Left Ear: External ear normal    Mouth/Throat: Oropharynx is clear and moist  No oropharyngeal exudate  Neck: Normal range of motion  Cardiovascular: Normal rate, regular rhythm and normal heart sounds      Pulmonary/Chest: Effort normal and breath sounds normal    Abdominal: Soft  Bowel sounds are normal  There is no tenderness  Vitals:    07/19/18 1134   BP: 132/74   Pulse: 77   Temp: 99 °F (37 2 °C)   SpO2: 98%   Weight: 70 6 kg (155 lb 9 6 oz)   Height: 5' 0 75" (1 543 m)       Transitional Care Management Review:  Russ Monahan is a 81 y o  female here for TCM follow up       During the TCM phone call patient stated:    Date and time hospital follow up call was made:  7/9/2018  4:17 PM  Hospital care reviewed:  Records reviewed  Patient was hopsitalized at:  [facility]  Date of admission:  6/30/18  Date of discharge:  7/8/18  Diagnosis:  lower abd pain=Perforated appendicitis  Disposition:  Home  Were the patients medicaitons reviewed and updated:  Yes (Comment: went over dc meds )  Current symptoms:  Lower abdominal pain  Lower abdominal pain severity:  Mild  Lower abdominal pain onset:  Gradual, Other (comment) (Comment: patient states that pain comes and goes )  Post hospital issues:  None  Scheduled for follow up?:  Yes  Patients specialists:  Other (comment)  Other specialists Name:  General Surgery- Dr Mensah  Did you obtain your prescribed medications:  Yes  Do you need help managing your perscriptions or medications:  No  Is transportation to your appointments needed:  No  I have advised the patient to call PCP with any new or worsening symptoms (please type in name along with any credentials):  Gerhardt Blower MA   Living Arrangements:  Children  Support System:  Family  Are you recieving outpatient services:  No  Are you recieving home care services:  No  Interperter language line required?:  No  Counseling:  Patient  Counseling topics:  Importance of RX compliance             Marie Dominguez PA-C

## 2018-08-01 ENCOUNTER — OFFICE VISIT (OUTPATIENT)
Dept: SURGERY | Facility: CLINIC | Age: 83
End: 2018-08-01
Payer: MEDICARE

## 2018-08-01 VITALS
HEART RATE: 70 BPM | HEIGHT: 62 IN | DIASTOLIC BLOOD PRESSURE: 68 MMHG | SYSTOLIC BLOOD PRESSURE: 140 MMHG | RESPIRATION RATE: 15 BRPM | WEIGHT: 159.8 LBS | BODY MASS INDEX: 29.4 KG/M2 | TEMPERATURE: 98.5 F

## 2018-08-01 DIAGNOSIS — K35.32 PERFORATED APPENDICITIS: Primary | ICD-10-CM

## 2018-08-01 PROCEDURE — 99214 OFFICE O/P EST MOD 30 MIN: CPT | Performed by: SURGERY

## 2018-08-01 NOTE — PROGRESS NOTES
GENERAL SURGERY HISTORY AND PHYSICAL     Ruddy Nicholson 81 y o  female MRN: [id number]  Unit/Bed#:  Encounter: [id number]      Assessment/Plan   1  Perforated appendicitis       Patient continues to feel well, has completed her antibiotic course  No evidence of recurrent infection  Patient does not wish to proceed with interval appendectomy at this time  I informed her that I can make no guarantees or prediction  about her long-term course  There is a distinct possibility she may developed either scar recurrent appendicitis or further infectious complications from the abscess  She does not show any evidence of infection clinically and I would not advocate for further workup with labs or imaging given her stable clinical presentation  Similarly I also discussed with her that given her advanced age the chance of appendiceal malignancy precipitating the appendicitis as a definitive possibility as well  Unable to evaluate for this based on any imaging, only definitive diagnosis would be based on pathology after surgery  Patient is aware of the the possibility of appendiceal malignancy as well as possible recurrence appendicitis or complication from appendiceal abscess  She wishes to proceed with watchful waiting, and will present for further evaluation should she have any recurrence of similar symptoms  If patient does have recurrent issues from the perforation would recommend she undergo appendectomy for definitive resolution, would not consider repeat course of antibiotics unless patient was unstable for surgery  Chief Complaint perforated appendicitis with abscess    HPI: Ruddy Nicholson is a 81y o  year old female originally admitted with perforated appendicitis  Patient's clinical symptoms resolved with course of IV antibiotics as well as aspiration of abscess by IR  Patient continues to be asymptomatic, she has completed her post hospital course of oral antibiotics    Patient denies any recurrence of abdominal pain she had had admission  Tolerating diet no change in bowel function  Patient denies fevers or chills  ROS:  12 Point ROS reviewed and negative except for:  Abdominal pain, resolved    Historical Information   Past Medical History:   Diagnosis Date    Diabetes mellitus ([address] )     GERD (gastroesophageal reflux disease)     Hyperlipidemia     Hypertension      Past Surgical History:   Procedure Laterality Date     SECTION      CHOLECYSTECTOMY      NECK SURGERY      [place] COLONOSCOPY FLX DX W/COLLJ SPEC WHEN PFRMD N/A 3/1/2018    Procedure: COLONOSCOPY;  Surgeon: Farhan Dewitt MD;  Location: [facility]; Service: Gastroenterology     Social History   History   Alcohol Use No     History   Drug Use No     History   Smoking Status    Never Smoker   Smokeless Tobacco    Never Used     Family History: no pertinent family history  No Known Allergies  Meds/Allergies   all current active meds have been reviewed      Objective   Vitals: Blood pressure 140/68, pulse 70, temperature 98 5 °F (36 9 °C), temperature source Oral, resp  rate 15, height 5' 2" (1 575 m), weight 72 5 kg (159 lb 12 8 oz), not currently breastfeeding  ,Body mass index is 29 23 kg/m²    Physical Exam:    General appearance: Awake alert oriented no acute distress  HEENT: Sclera clear, anicterus, no discharge  Neck: Trachea is midline  Lungs: No respiratory distress  Psych: Affect appropriate    Laura Vanegas MD  2018

## 2018-09-12 DIAGNOSIS — E78.2 MIXED HYPERLIPIDEMIA: ICD-10-CM

## 2018-09-12 DIAGNOSIS — E11.9 TYPE 2 DIABETES MELLITUS WITHOUT COMPLICATION, WITHOUT LONG-TERM CURRENT USE OF INSULIN (HCC): ICD-10-CM

## 2018-09-12 DIAGNOSIS — I10 ESSENTIAL HYPERTENSION: ICD-10-CM

## 2018-09-12 RX ORDER — METFORMIN HYDROCHLORIDE 500 MG/1
500 TABLET, EXTENDED RELEASE ORAL 2 TIMES DAILY
Qty: 180 TABLET | Refills: 0 | Status: SHIPPED | OUTPATIENT
Start: 2018-09-12 | End: 2018-12-03 | Stop reason: SDUPTHER

## 2018-09-12 RX ORDER — PRAVASTATIN SODIUM 20 MG
20 TABLET ORAL DAILY
Qty: 90 TABLET | Refills: 0 | Status: SHIPPED | OUTPATIENT
Start: 2018-09-12 | End: 2019-08-30 | Stop reason: SDUPTHER

## 2018-09-12 RX ORDER — METOPROLOL SUCCINATE 100 MG/1
100 TABLET, EXTENDED RELEASE ORAL DAILY
Qty: 90 TABLET | Refills: 0 | Status: SHIPPED | OUTPATIENT
Start: 2018-09-12 | End: 2018-12-03 | Stop reason: SDUPTHER

## 2018-09-12 RX ORDER — HYDROCHLOROTHIAZIDE 12.5 MG/1
12.5 TABLET ORAL DAILY
Qty: 90 TABLET | Refills: 0 | Status: SHIPPED | OUTPATIENT
Start: 2018-09-12 | End: 2018-12-03 | Stop reason: SDUPTHER

## 2018-09-12 RX ORDER — LISINOPRIL 40 MG/1
40 TABLET ORAL DAILY
Qty: 90 TABLET | Refills: 0 | Status: SHIPPED | OUTPATIENT
Start: 2018-09-12 | End: 2018-12-03 | Stop reason: SDUPTHER

## 2018-09-12 RX ORDER — AMLODIPINE BESYLATE 5 MG/1
5 TABLET ORAL DAILY
Qty: 90 TABLET | Refills: 0 | Status: SHIPPED | OUTPATIENT
Start: 2018-09-12 | End: 2018-12-03 | Stop reason: SDUPTHER

## 2018-09-12 NOTE — TELEPHONE ENCOUNTER
Pt called she needs a refill on all her medication and also she got a letter about the recall for hydrochlorothiazide wants to know what can she take in place of it   Please call pt about this

## 2018-09-12 NOTE — TELEPHONE ENCOUNTER
Patient called, she spoke with the pharmacy again, she was wrong  Her hydrochlorothazide-Does not to be changed-not recalled    Also stated she needs refills for all her meds     She just needs a refill now on it  [facility] pharmacy [phone]    Patient # [phone]

## 2018-10-05 ENCOUNTER — APPOINTMENT (OUTPATIENT)
Dept: LAB | Facility: CLINIC | Age: 83
End: 2018-10-05
Payer: MEDICARE

## 2018-10-05 DIAGNOSIS — E78.2 MIXED HYPERLIPIDEMIA: ICD-10-CM

## 2018-10-05 DIAGNOSIS — E11.9 TYPE 2 DIABETES MELLITUS WITHOUT COMPLICATION, WITHOUT LONG-TERM CURRENT USE OF INSULIN (HCC): ICD-10-CM

## 2018-10-05 LAB
ALBUMIN SERPL BCP-MCNC: 3.6 G/DL (ref 3.5–5)
ALP SERPL-CCNC: 60 U/L (ref 46–116)
ALT SERPL W P-5'-P-CCNC: 16 U/L (ref 12–78)
ANION GAP SERPL CALCULATED.3IONS-SCNC: 8 MMOL/L (ref 4–13)
AST SERPL W P-5'-P-CCNC: 10 U/L (ref 5–45)
BASOPHILS # BLD AUTO: 0.03 THOUSANDS/ΜL (ref 0–0.1)
BASOPHILS NFR BLD AUTO: 0 % (ref 0–1)
BILIRUB SERPL-MCNC: 0.6 MG/DL (ref 0.2–1)
BUN SERPL-MCNC: 12 MG/DL (ref 5–25)
CALCIUM SERPL-MCNC: 9.6 MG/DL (ref 8.3–10.1)
CHLORIDE SERPL-SCNC: 98 MMOL/L (ref 100–108)
CHOLEST SERPL-MCNC: 147 MG/DL (ref 50–200)
CO2 SERPL-SCNC: 28 MMOL/L (ref 21–32)
CREAT SERPL-MCNC: 0.77 MG/DL (ref 0.6–1.3)
CREAT UR-MCNC: 36.3 MG/DL
EOSINOPHIL # BLD AUTO: 0.17 THOUSAND/ΜL (ref 0–0.61)
EOSINOPHIL NFR BLD AUTO: 2 % (ref 0–6)
ERYTHROCYTE [DISTWIDTH] IN BLOOD BY AUTOMATED COUNT: 15.8 % (ref 11.6–15.1)
EST. AVERAGE GLUCOSE BLD GHB EST-MCNC: 131 MG/DL
GFR SERPL CREATININE-BSD FRML MDRD: 83 ML/MIN/1.73SQ M
GLUCOSE P FAST SERPL-MCNC: 104 MG/DL (ref 65–99)
HBA1C MFR BLD: 6.2 % (ref 4.2–6.3)
HCT VFR BLD AUTO: 41.9 % (ref 34.8–46.1)
HDLC SERPL-MCNC: 55 MG/DL (ref 40–60)
HGB BLD-MCNC: 13.3 G/DL (ref 11.5–15.4)
IMM GRANULOCYTES # BLD AUTO: 0.03 THOUSAND/UL (ref 0–0.2)
IMM GRANULOCYTES NFR BLD AUTO: 0 % (ref 0–2)
LDLC SERPL CALC-MCNC: 64 MG/DL (ref 0–100)
LYMPHOCYTES # BLD AUTO: 3.47 THOUSANDS/ΜL (ref 0.6–4.47)
LYMPHOCYTES NFR BLD AUTO: 34 % (ref 14–44)
MCH RBC QN AUTO: 26 PG (ref 26.8–34.3)
MCHC RBC AUTO-ENTMCNC: 31.7 G/DL (ref 31.4–37.4)
MCV RBC AUTO: 82 FL (ref 82–98)
MICROALBUMIN UR-MCNC: 311 MG/L (ref 0–20)
MICROALBUMIN/CREAT 24H UR: 857 MG/G CREATININE (ref 0–30)
MONOCYTES # BLD AUTO: 0.84 THOUSAND/ΜL (ref 0.17–1.22)
MONOCYTES NFR BLD AUTO: 8 % (ref 4–12)
NEUTROPHILS # BLD AUTO: 5.65 THOUSANDS/ΜL (ref 1.85–7.62)
NEUTS SEG NFR BLD AUTO: 56 % (ref 43–75)
NONHDLC SERPL-MCNC: 92 MG/DL
NRBC BLD AUTO-RTO: 0 /100 WBCS
PLATELET # BLD AUTO: 309 THOUSANDS/UL (ref 149–390)
PMV BLD AUTO: 9.5 FL (ref 8.9–12.7)
POTASSIUM SERPL-SCNC: 3.8 MMOL/L (ref 3.5–5.3)
PROT SERPL-MCNC: 8.3 G/DL (ref 6.4–8.2)
RBC # BLD AUTO: 5.12 MILLION/UL (ref 3.81–5.12)
SODIUM SERPL-SCNC: 134 MMOL/L (ref 136–145)
TRIGL SERPL-MCNC: 142 MG/DL
TSH SERPL DL<=0.05 MIU/L-ACNC: 0.13 UIU/ML (ref 0.36–3.74)
WBC # BLD AUTO: 10.19 THOUSAND/UL (ref 4.31–10.16)

## 2018-10-05 PROCEDURE — 36415 COLL VENOUS BLD VENIPUNCTURE: CPT

## 2018-10-05 PROCEDURE — 85025 COMPLETE CBC W/AUTO DIFF WBC: CPT

## 2018-10-05 PROCEDURE — 80053 COMPREHEN METABOLIC PANEL: CPT

## 2018-10-05 PROCEDURE — 80061 LIPID PANEL: CPT

## 2018-10-05 PROCEDURE — 84443 ASSAY THYROID STIM HORMONE: CPT

## 2018-10-05 PROCEDURE — 82570 ASSAY OF URINE CREATININE: CPT

## 2018-10-05 PROCEDURE — 82043 UR ALBUMIN QUANTITATIVE: CPT

## 2018-10-05 PROCEDURE — 83036 HEMOGLOBIN GLYCOSYLATED A1C: CPT

## 2018-10-08 ENCOUNTER — OFFICE VISIT (OUTPATIENT)
Dept: INTERNAL MEDICINE CLINIC | Facility: CLINIC | Age: 83
End: 2018-10-08
Payer: MEDICARE

## 2018-10-08 VITALS
HEIGHT: 62 IN | TEMPERATURE: 98.5 F | HEART RATE: 76 BPM | BODY MASS INDEX: 30.18 KG/M2 | DIASTOLIC BLOOD PRESSURE: 86 MMHG | WEIGHT: 164 LBS | SYSTOLIC BLOOD PRESSURE: 138 MMHG | OXYGEN SATURATION: 97 %

## 2018-10-08 DIAGNOSIS — D72.829 LEUKOCYTOSIS, UNSPECIFIED TYPE: ICD-10-CM

## 2018-10-08 DIAGNOSIS — E78.2 MIXED HYPERLIPIDEMIA: Chronic | ICD-10-CM

## 2018-10-08 DIAGNOSIS — R79.89 ABNORMAL TSH: ICD-10-CM

## 2018-10-08 DIAGNOSIS — E11.9 TYPE 2 DIABETES MELLITUS WITHOUT COMPLICATION, WITHOUT LONG-TERM CURRENT USE OF INSULIN (HCC): Primary | Chronic | ICD-10-CM

## 2018-10-08 DIAGNOSIS — I10 ESSENTIAL HYPERTENSION: Chronic | ICD-10-CM

## 2018-10-08 DIAGNOSIS — Z23 NEED FOR INFLUENZA VACCINATION: ICD-10-CM

## 2018-10-08 PROBLEM — R10.33 PERIUMBILICAL ABDOMINAL PAIN: Status: RESOLVED | Noted: 2018-07-01 | Resolved: 2018-10-08

## 2018-10-08 PROBLEM — K35.32 PERFORATED APPENDICITIS: Status: RESOLVED | Noted: 2018-07-01 | Resolved: 2018-10-08

## 2018-10-08 PROBLEM — R93.5 ABNORMAL CT OF THE ABDOMEN: Status: RESOLVED | Noted: 2018-02-23 | Resolved: 2018-10-08

## 2018-10-08 PROBLEM — A41.9 SEPSIS (HCC): Status: RESOLVED | Noted: 2018-07-06 | Resolved: 2018-10-08

## 2018-10-08 PROCEDURE — 99214 OFFICE O/P EST MOD 30 MIN: CPT | Performed by: INTERNAL MEDICINE

## 2018-10-08 PROCEDURE — G0008 ADMIN INFLUENZA VIRUS VAC: HCPCS | Performed by: INTERNAL MEDICINE

## 2018-10-08 PROCEDURE — 90662 IIV NO PRSV INCREASED AG IM: CPT | Performed by: INTERNAL MEDICINE

## 2018-10-08 NOTE — PROGRESS NOTES
INTERNAL MEDICINE OFFICE VISIT  [facility] AT [facility], [address]  Tel: [phone]      NAME: Gurdeep Bill  AGE: 81 y o  SEX: female  : 1935   MRN: [id number]    DATE: 10/8/2018  TIME: 11:44 AM      Assessment and Plan:  1  Type 2 diabetes mellitus without complication, without long-term current use of insulin (HCC)  Very well controlled, continue same medication  - CBC and differential; Future  - Comprehensive metabolic panel; Future  - Hemoglobin A1C; Future    2  Essential hypertension  Continue medications    3  Mixed hyperlipidemia  Continue pravastatin  - Lipid panel; Future    4  Abnormal TSH  Will follow-up TSH, T3 and T4 in 2 months   - TSH, 3rd generation; Future  - TSH, 3rd generation; Future  - T4, free; Future  - T3; Future    5  Leukocytosis, unspecified type  White count is slightly elevated, follow-up CBC    6  Need for influenza vaccination    - influenza vaccine, [id number], high-dose, PF 0 5 mL, for patients 65 yr+ (FLUZONE HIGH-DOSE)      - Counseling Documentation: patient was counseled regarding: diagnostic results, instructions for management, risk factor reductions, prognosis, patient and family education, impressions, risks and benefits of treatment options and importance of compliance with treatment  - Medication Side Effects: Adverse side effects of medications were reviewed with the patient/guardian today  Return for follow up visit in 6 months or earlier, if needed  Chief Complaint:  Chief Complaint   Patient presents with    Well Check     4 month         History of Present Illness:   Type 2 diabetes-very well controlled with a hemoglobin A1c of 6 2  Hypertension-blood pressure stable on present medication  Hyperlipidemia-she takes pravastatin regularly  Abnormal TSH-her TSH has been on the lower side but she is totally asymptomatic    When she was in the hospital about 2 months ago, a T3 and T4 were checked which were within normal limits  Leukocytosis-her white count was very high when she was sick with the appendicular abscess  Now the white count is 10 19 which is marginally elevated  She is otherwise asymptomatic and does not have any abdominal symptoms or fever  Active Problem List:  Patient Active Problem List   Diagnosis    Type 2 diabetes mellitus without complication, without long-term current use of insulin (HCC)    Mixed hyperlipidemia    Abnormal TSH    Leukocytosis    Colitis    Essential hypertension    Back pain         Past Medical History:  Past Medical History:   Diagnosis Date    [facility] mellitus ([facility] )     GERD (gastroesophageal reflux disease)     Hyperlipidemia     Hypertension     Perforated appendicitis 2018    [facility] 2018         Past Surgical History:  Past Surgical History:   Procedure Laterality Date     SECTION      CHOLECYSTECTOMY      NECK SURGERY      ND COLONOSCOPY FLX DX W/COLLJ SPEC WHEN PFRMD N/A 3/1/2018    Procedure: COLONOSCOPY;  Surgeon: Rainer Harvey MD;  Location: [facility]; Service: Gastroenterology         Family History:  Family History   Problem Relation Age of Onset    No Known Problems Mother     No Known Problems Father          Social History:  Social History     Social History    Marital status:       Spouse name: N/A    Number of children: N/A    Years of education: N/A     Social History Main Topics    Smoking status: Never Smoker    Smokeless tobacco: Never Used    Alcohol use No    Drug use: No    Sexual activity: Not Currently     Other Topics Concern    None     Social History Narrative    No advance directive on file         Allergies:  No Known Allergies      Medications:    Current Outpatient Prescriptions:     amLODIPine (NORVASC) 5 mg tablet, Take 1 tablet (5 mg total) by mouth daily, Disp: 90 tablet, Rfl: 0    Calcium Carbonate-Vitamin D3 (CALCIUM 600/VITAMIN D) 600-400 MG-UNIT TABS, Take by mouth, Disp: , Rfl:     hydrochlorothiazide (HYDRODIURIL) 12 5 mg tablet, Take 1 tablet (12 5 mg total) by mouth daily, Disp: 90 tablet, Rfl: 0    lisinopril (ZESTRIL) 40 mg tablet, Take 1 tablet (40 mg total) by mouth daily, Disp: 90 tablet, Rfl: 0    metFORMIN (GLUCOPHAGE-XR) 500 mg 24 hr tablet, Take 1 tablet (500 mg total) by mouth 2 (two) times a day, Disp: 180 tablet, Rfl: 0    metoprolol succinate (TOPROL-XL) 100 mg 24 hr tablet, Take 1 tablet (100 mg total) by mouth daily for 90 days, Disp: 90 tablet, Rfl: 0    pravastatin (PRAVACHOL) 20 mg tablet, Take 1 tablet (20 mg total) by mouth daily for 90 days, Disp: 90 tablet, Rfl: 0    pantoprazole (PROTONIX) 40 mg tablet, Take 1 tablet (40 mg total) by mouth daily in the early morning (Patient not taking: Reported on 10/8/2018 ), Disp: 30 tablet, Rfl: 0      The following portions of the patient's history were reviewed and updated as appropriate: past medical history, past surgical history, family history, social history, allergies, current medications and active problem list       Review of Systems:  Constitutional: Denies fever, chills, weight gain, weight loss, fatigue  Eyes: Denies eye redness, eye discharge, double vision, change in visual acuity  ENT: Denies hearing loss, tinnitus, sneezing, nasal congestion, nasal discharge, sore throat   Respiratory: Denies cough, expectoration, hemoptysis, shortness of breath, wheezing  Cardiovascular: Denies chest pain, palpitations, lower extremity swelling, orthopnea, PND  Gastrointestinal: Denies abdominal pain, heartburn, nausea, vomiting, hematemesis, diarrhea, bloody stools  Genito-Urinary: Denies dysuria, frequency, difficulty in micturition, nocturia, incontinence  Musculoskeletal: Denies back pain, joint pain, muscle pain  Neurologic: Denies confusion, lightheadedness, syncope, headache, focal weakness, sensory changes, seizures  Endocrine: Denies polyuria, polydipsia, temperature intolerance  Allergy and Immunology: Denies hives, insect bite sensitivity  Hematological and Lymphatic: Denies bleeding problems, swollen glands   Psychological: Denies depression, suicidal ideation, anxiety, panic, mood swings  Dermatological: Denies pruritus, rash, skin lesion changes      Vitals:  Vitals:    10/08/18 1112   BP: 138/86   Pulse: 76   Temp: 98 5 °F (36 9 °C)   SpO2: 97%       Body mass index is 30 kg/m²  Weight (last 2 days)     Date/Time   Weight    10/08/18 1112  74 4 (164)                Physical Examination:  General: Patient is not in acute distress  Awake, alert, responding to commands  No weight gain or loss  Head: Normocephalic  Atraumatic  Eyes: Conjunctiva and lids with no swelling, erythema or discharge  Both pupils normal sized, round and reactive to light  Sclera nonicteric  ENT: External examination of nose and ear normal  Otoscopic examination shows translucent tympanic membranes with patent canals without erythema  Oropharynx moist with no erythema, edema, exudate or lesions  Neck: Supple  JVP not raised  Trachea midline  No masses  No thyromegaly  Lungs: No signs of increased work of breathing or respiratory distress  Bilateral bronchovascular breath sounds with no crackles or rhonchi  Chest wall: No tenderness  Cardiovascular: Normal PMI  No thrills  Regular rate and rhythm  S1 and S2 normal  No murmur, rub or gallop  Gastrointestinal: Abdomen soft, nontender  No guarding or rigidity  Liver and spleen not palpable  Bowel sounds present  Neurologic: Cranial nerves II-XII intact   Cortical functions normal  Motor system - Reflexes 2+ and symmetrical  Sensations normal  Musculoskeletal: Gait normal  No joint tenderness  Integumentary: Skin normal with no rash or lesions  Lymphatic: No palpable lymph nodes in neck, axilla or groin  Extremities: No clubbing, cyanosis, edema or varicosities  Psychological: Judgement and insight normal  Mood and affect normal      Laboratory Results:  CBC with diff:   Lab Results   Component Value Date    WBC 10 19 (H) 10/05/2018    RBC 5 12 10/05/2018    HGB 13 3 10/05/2018    HCT 41 9 10/05/2018    MCV 82 10/05/2018    MCH 26 0 (L) 10/05/2018    RDW 15 8 (H) 10/05/2018     10/05/2018       CMP:  Lab Results   Component Value Date    CREATININE 0 77 10/05/2018    BUN 12 10/05/2018     (L) 10/05/2018    K 3 8 10/05/2018    CL 98 (L) 10/05/2018    CO2 28 10/05/2018    ALKPHOS 60 10/05/2018    ALT 16 10/05/2018    AST 10 10/05/2018       Lab Results   Component Value Date    HGBA1C 6 2 10/05/2018    MG 2 0 07/04/2018    PHOS 2 7 07/01/2018       No results found for: TROPONINI, CKMB, CKTOTAL    Lipid Profile:   No results found for: CHOL  Lab Results   Component Value Date    HDL 55 10/05/2018    HDL 45 06/05/2018     Lab Results   Component Value Date    LDLCALC 64 10/05/2018    LDLCALC 38 06/05/2018     Lab Results   Component Value Date    TRIG 142 10/05/2018    TRIG 137 06/05/2018         Health Maintenance:  Health Maintenance   Topic Date Due    DTaP,Tdap,and Td Vaccines (1 - Tdap) 05[birth year]07/1957    Pneumococcal PPSV23/PCV13 65+ Years / Low and Medium Risk (1 of 2 - PCV13) 05/07/2001    INFLUENZA VACCINE  09/01/2018    DM Eye Exam  10/30/2018    Fall Risk  01/30/2019    Depression Screening PHQ  01/30/2019    Urinary Incontinence Screening  01/30/2019    Medicare Annual Wellness Visit (AWV)  01/30/2019    Diabetic Foot Exam  01/30/2019    HEMOGLOBIN A1C  04/05/2019    URINE MICROALBUMIN  10/05/2019     Immunization History   Administered Date(s) Administered    Influenza Split High Dose Preservative Free IM 10/02/2017         Micki Shrestha MD  10/8/2018,11:44 AM

## 2018-11-29 ENCOUNTER — TELEPHONE (OUTPATIENT)
Dept: INTERNAL MEDICINE CLINIC | Facility: CLINIC | Age: 83
End: 2018-11-29

## 2018-11-29 NOTE — TELEPHONE ENCOUNTER
Pt cld asking to have her following meds refilled and sent to [facility] on [address]      LISINOPRIL 40MG  [id number] 5mg  METOPROLOL 100mg  METFORMIN 500mg  AMLODIPINE 5mg

## 2018-12-03 DIAGNOSIS — E11.9 TYPE 2 DIABETES MELLITUS WITHOUT COMPLICATION, WITHOUT LONG-TERM CURRENT USE OF INSULIN (HCC): ICD-10-CM

## 2018-12-03 DIAGNOSIS — I10 ESSENTIAL HYPERTENSION: ICD-10-CM

## 2018-12-03 RX ORDER — AMLODIPINE BESYLATE 5 MG/1
5 TABLET ORAL DAILY
Qty: 90 TABLET | Refills: 0 | Status: SHIPPED | OUTPATIENT
Start: 2018-12-03 | End: 2019-12-14 | Stop reason: SDUPTHER

## 2018-12-03 RX ORDER — LISINOPRIL 40 MG/1
40 TABLET ORAL DAILY
Qty: 90 TABLET | Refills: 0 | Status: SHIPPED | OUTPATIENT
Start: 2018-12-03 | End: 2019-12-15 | Stop reason: SDUPTHER

## 2018-12-03 RX ORDER — METFORMIN HYDROCHLORIDE 500 MG/1
500 TABLET, EXTENDED RELEASE ORAL 2 TIMES DAILY
Qty: 180 TABLET | Refills: 0 | Status: SHIPPED | OUTPATIENT
Start: 2018-12-03 | End: 2019-12-16 | Stop reason: SDUPTHER

## 2018-12-03 RX ORDER — HYDROCHLOROTHIAZIDE 12.5 MG/1
12.5 TABLET ORAL DAILY
Qty: 90 TABLET | Refills: 0 | Status: SHIPPED | OUTPATIENT
Start: 2018-12-03 | End: 2019-12-14 | Stop reason: SDUPTHER

## 2018-12-03 RX ORDER — METOPROLOL SUCCINATE 100 MG/1
100 TABLET, EXTENDED RELEASE ORAL DAILY
Qty: 90 TABLET | Refills: 0 | Status: SHIPPED | OUTPATIENT
Start: 2018-12-03 | End: 2019-12-07 | Stop reason: SDUPTHER

## 2019-01-14 ENCOUNTER — TELEPHONE (OUTPATIENT)
Dept: INTERNAL MEDICINE CLINIC | Facility: CLINIC | Age: 84
End: 2019-01-14

## 2019-01-16 ENCOUNTER — OFFICE VISIT (OUTPATIENT)
Dept: INTERNAL MEDICINE CLINIC | Facility: CLINIC | Age: 84
End: 2019-01-16
Payer: MEDICARE

## 2019-01-16 VITALS
HEART RATE: 81 BPM | DIASTOLIC BLOOD PRESSURE: 82 MMHG | WEIGHT: 173 LBS | TEMPERATURE: 98.5 F | BODY MASS INDEX: 31.83 KG/M2 | SYSTOLIC BLOOD PRESSURE: 120 MMHG | OXYGEN SATURATION: 98 % | HEIGHT: 62 IN

## 2019-01-16 DIAGNOSIS — R10.33 PERIUMBILICAL ABDOMINAL PAIN: Primary | ICD-10-CM

## 2019-01-16 PROCEDURE — 99214 OFFICE O/P EST MOD 30 MIN: CPT | Performed by: PHYSICIAN ASSISTANT

## 2019-01-16 NOTE — PROGRESS NOTES
Assessment/Plan:    abd pain-pt advised to contact Dr Birdie Jessica office ASAP so he can proceed with further tests or see her in the office to evaluate her for the need for surgery  No problem-specific Assessment & Plan notes found for this encounter  There are no diagnoses linked to this encounter  Subjective:      Patient ID: Rosie Muro is a 81 y o  female  Pt comes with c/o abd pain that occurred 2 days ago  She also had nausea and vomiting x 1  Today the pain is gone but over the summer she was hospitalized for a perforated appendix with abscess  At the time they treated the infection, and told the pt if the pain returns to contact the surgeon  The last visit with Dr Durga Hein the pt had in Aug stated to contact him if pain recurs  At that time they'd consider surgery and not just a course of ABX  No f/c/s  No diarrhea  The pt states the pain on Monday was the same as when she was in the hospital for the perforated appendix  The following portions of the patient's history were reviewed and updated as appropriate: allergies, current medications, past medical history, past social history, past surgical history and problem list     Review of Systems   Constitutional: Negative for chills, fatigue and fever  Respiratory: Negative for cough and shortness of breath  Cardiovascular: Negative for chest pain and palpitations  Gastrointestinal: Positive for abdominal pain, nausea and vomiting  Negative for blood in stool and diarrhea  Objective:      /82   Pulse 81   Temp 98 5 °F (36 9 °C)   Ht 5' 2" (1 575 m)   Wt 78 5 kg (173 lb)   SpO2 98%   BMI 31 64 kg/m²          Physical Exam   Constitutional: She appears well-developed and well-nourished  HENT:   Head: Normocephalic and atraumatic     Right Ear: External ear normal    Left Ear: External ear normal    Mouth/Throat: Oropharynx is clear and moist    Cardiovascular: Normal rate, regular rhythm and normal heart sounds  Pulmonary/Chest: Effort normal and breath sounds normal  No respiratory distress  Abdominal: Soft  Bowel sounds are normal  There is no tenderness  There is no rebound and no guarding

## 2019-02-04 ENCOUNTER — OFFICE VISIT (OUTPATIENT)
Dept: SURGERY | Facility: CLINIC | Age: 84
End: 2019-02-04
Payer: MEDICARE

## 2019-02-04 VITALS
HEART RATE: 81 BPM | WEIGHT: 170 LBS | DIASTOLIC BLOOD PRESSURE: 78 MMHG | TEMPERATURE: 98.4 F | RESPIRATION RATE: 14 BRPM | SYSTOLIC BLOOD PRESSURE: 130 MMHG | BODY MASS INDEX: 31.28 KG/M2 | HEIGHT: 62 IN

## 2019-02-04 DIAGNOSIS — E11.9 TYPE 2 DIABETES MELLITUS WITHOUT COMPLICATION, WITHOUT LONG-TERM CURRENT USE OF INSULIN (HCC): Chronic | ICD-10-CM

## 2019-02-04 DIAGNOSIS — R11.10 ABDOMINAL PAIN WITH VOMITING: Primary | ICD-10-CM

## 2019-02-04 DIAGNOSIS — Z87.19 HISTORY OF APPENDICITIS: ICD-10-CM

## 2019-02-04 DIAGNOSIS — R10.9 ABDOMINAL PAIN WITH VOMITING: Primary | ICD-10-CM

## 2019-02-04 PROCEDURE — 99213 OFFICE O/P EST LOW 20 MIN: CPT | Performed by: SURGERY

## 2019-02-04 NOTE — PROGRESS NOTES
GENERAL SURGERY HISTORY AND PHYSICAL     Alla Garcia 81 y o  female MRN: [id number]  Unit/Bed#:  Encounter: [id number]      Assessment/Plan   1  Abdominal pain with vomiting     2  History of appendicitis     3  Type 2 diabetes mellitus without complication, without long-term current use of insulin ([facility] )       Patient with previous episode of appendicitis with abscess treated successfully with conservative measures  Patient had 1 brief episode of abdominal pain that has since resolved  No other alarming symptoms at this time  Low suspicion for any relationship to the previous appendicitis, I suspect most likely gastroenteritis  Discussed with patient continue monitoring closely to seek evaluation if she does have any concerning symptoms  Patient follow-up as needed for any further issues  Chief Complaint abdominal pain resolved    HPI: Alla Garcia is a 81y o  year old female well known to the service  Patient episode of acute appendicitis with abscess that was managed conservatively with IV antibiotics  I have had long discussions with the patient's of the possibility of recurrent appendicitis  Patient had an episode of abdominal pain several weeks ago predominantly in the upper abdomen  She has associated episode of nausea with this abdominal pain after which point the abdominal pain resolved  Patient denies any episodes of any abdominal discomfort since then  Is having normal bowel function  Denies fevers or chills  No other acute changes        ROS:  12 Point ROS reviewed and negative except for:  Upper abdominal pain with associated nausea vomiting all resolved    Historical Information   Past Medical History:   Diagnosis Date    [facility] )     GERD (gastroesophageal reflux disease)     Hyperlipidemia     Hypertension     Perforated appendicitis 2018    Sepsis ([facility] ) 2018     Past Surgical History:   Procedure Laterality Date     SECTION      CHOLECYSTECTOMY      NECK SURGERY      MT COLONOSCOPY FLX DX W/COLLJ SPEC WHEN PFRMD N/A 3/1/2018    Procedure: COLONOSCOPY;  Surgeon: Patti Murphy MD;  Location: [facility]; Service: Gastroenterology     Social History   History   Alcohol Use No     History   Drug Use No     History   Smoking Status    Never Smoker   Smokeless Tobacco    Never Used     Family History: no pertinent family history  No Known Allergies  Meds/Allergies   all current active meds have been reviewed      Objective   Vitals: Blood pressure 130/78, pulse 81, temperature 98 4 °F (36 9 °C), temperature source Tympanic, resp  rate 14, height 5' 2" (1 575 m), weight 77 1 kg (170 lb), not currently breastfeeding  ,Body mass index is 31 09 kg/m²    Physical Exam:    General appearance: Awake alert oriented no acute distress  HEENT: Sclera clear, anicterus, no discharge  Neck: Trachea is midline  Lungs: No respiratory distress  Abdomen: soft, nondistended, nontender  Neurologic:  Ambulating with no issue  Psych: Affect appropriate    Ann Mahoney MD  2/4/2019

## 2019-03-20 ENCOUNTER — APPOINTMENT (OUTPATIENT)
Dept: LAB | Facility: CLINIC | Age: 84
End: 2019-03-20
Payer: MEDICARE

## 2019-03-20 DIAGNOSIS — R79.89 ABNORMAL TSH: ICD-10-CM

## 2019-03-20 LAB
T3 SERPL-MCNC: 1 NG/ML (ref 0.6–1.8)
T4 FREE SERPL-MCNC: 1 NG/DL (ref 0.76–1.46)
TSH SERPL DL<=0.05 MIU/L-ACNC: 0.26 UIU/ML (ref 0.36–3.74)

## 2019-03-20 PROCEDURE — 84443 ASSAY THYROID STIM HORMONE: CPT

## 2019-03-20 PROCEDURE — 84480 ASSAY TRIIODOTHYRONINE (T3): CPT

## 2019-03-20 PROCEDURE — 36415 COLL VENOUS BLD VENIPUNCTURE: CPT

## 2019-03-20 PROCEDURE — 84439 ASSAY OF FREE THYROXINE: CPT

## 2019-04-08 ENCOUNTER — APPOINTMENT (OUTPATIENT)
Dept: LAB | Facility: CLINIC | Age: 84
End: 2019-04-08
Payer: MEDICARE

## 2019-04-08 DIAGNOSIS — E11.9 TYPE 2 DIABETES MELLITUS WITHOUT COMPLICATION, WITHOUT LONG-TERM CURRENT USE OF INSULIN (HCC): Chronic | ICD-10-CM

## 2019-04-08 DIAGNOSIS — E78.2 MIXED HYPERLIPIDEMIA: Chronic | ICD-10-CM

## 2019-04-08 DIAGNOSIS — R79.89 ABNORMAL TSH: ICD-10-CM

## 2019-04-08 LAB
ALBUMIN SERPL BCP-MCNC: 3.7 G/DL (ref 3.5–5)
ALP SERPL-CCNC: 67 U/L (ref 46–116)
ALT SERPL W P-5'-P-CCNC: 16 U/L (ref 12–78)
ANION GAP SERPL CALCULATED.3IONS-SCNC: 7 MMOL/L (ref 4–13)
AST SERPL W P-5'-P-CCNC: 12 U/L (ref 5–45)
BASOPHILS # BLD AUTO: 0.04 THOUSANDS/ΜL (ref 0–0.1)
BASOPHILS NFR BLD AUTO: 0 % (ref 0–1)
BILIRUB SERPL-MCNC: 0.49 MG/DL (ref 0.2–1)
BUN SERPL-MCNC: 14 MG/DL (ref 5–25)
CALCIUM SERPL-MCNC: 9.3 MG/DL (ref 8.3–10.1)
CHLORIDE SERPL-SCNC: 106 MMOL/L (ref 100–108)
CHOLEST SERPL-MCNC: 143 MG/DL (ref 50–200)
CO2 SERPL-SCNC: 28 MMOL/L (ref 21–32)
CREAT SERPL-MCNC: 0.78 MG/DL (ref 0.6–1.3)
EOSINOPHIL # BLD AUTO: 0.1 THOUSAND/ΜL (ref 0–0.61)
EOSINOPHIL NFR BLD AUTO: 1 % (ref 0–6)
ERYTHROCYTE [DISTWIDTH] IN BLOOD BY AUTOMATED COUNT: 15.8 % (ref 11.6–15.1)
EST. AVERAGE GLUCOSE BLD GHB EST-MCNC: 131 MG/DL
GFR SERPL CREATININE-BSD FRML MDRD: 82 ML/MIN/1.73SQ M
GLUCOSE P FAST SERPL-MCNC: 115 MG/DL (ref 65–99)
HBA1C MFR BLD: 6.2 % (ref 4.2–6.3)
HCT VFR BLD AUTO: 40.9 % (ref 34.8–46.1)
HDLC SERPL-MCNC: 49 MG/DL (ref 40–60)
HGB BLD-MCNC: 13.3 G/DL (ref 11.5–15.4)
IMM GRANULOCYTES # BLD AUTO: 0.03 THOUSAND/UL (ref 0–0.2)
IMM GRANULOCYTES NFR BLD AUTO: 0 % (ref 0–2)
LDLC SERPL CALC-MCNC: 60 MG/DL (ref 0–100)
LYMPHOCYTES # BLD AUTO: 2.63 THOUSANDS/ΜL (ref 0.6–4.47)
LYMPHOCYTES NFR BLD AUTO: 26 % (ref 14–44)
MCH RBC QN AUTO: 25.9 PG (ref 26.8–34.3)
MCHC RBC AUTO-ENTMCNC: 32.5 G/DL (ref 31.4–37.4)
MCV RBC AUTO: 80 FL (ref 82–98)
MONOCYTES # BLD AUTO: 0.67 THOUSAND/ΜL (ref 0.17–1.22)
MONOCYTES NFR BLD AUTO: 7 % (ref 4–12)
NEUTROPHILS # BLD AUTO: 6.81 THOUSANDS/ΜL (ref 1.85–7.62)
NEUTS SEG NFR BLD AUTO: 66 % (ref 43–75)
NONHDLC SERPL-MCNC: 94 MG/DL
NRBC BLD AUTO-RTO: 0 /100 WBCS
PLATELET # BLD AUTO: 313 THOUSANDS/UL (ref 149–390)
PMV BLD AUTO: 9.5 FL (ref 8.9–12.7)
POTASSIUM SERPL-SCNC: 3.7 MMOL/L (ref 3.5–5.3)
PROT SERPL-MCNC: 8.5 G/DL (ref 6.4–8.2)
RBC # BLD AUTO: 5.13 MILLION/UL (ref 3.81–5.12)
SODIUM SERPL-SCNC: 141 MMOL/L (ref 136–145)
TRIGL SERPL-MCNC: 172 MG/DL
TSH SERPL DL<=0.05 MIU/L-ACNC: 0.14 UIU/ML (ref 0.36–3.74)
WBC # BLD AUTO: 10.28 THOUSAND/UL (ref 4.31–10.16)

## 2019-04-08 PROCEDURE — 84443 ASSAY THYROID STIM HORMONE: CPT

## 2019-04-08 PROCEDURE — 80061 LIPID PANEL: CPT

## 2019-04-08 PROCEDURE — 36415 COLL VENOUS BLD VENIPUNCTURE: CPT

## 2019-04-08 PROCEDURE — 83036 HEMOGLOBIN GLYCOSYLATED A1C: CPT

## 2019-04-08 PROCEDURE — 85025 COMPLETE CBC W/AUTO DIFF WBC: CPT

## 2019-04-08 PROCEDURE — 80053 COMPREHEN METABOLIC PANEL: CPT

## 2019-04-15 ENCOUNTER — OFFICE VISIT (OUTPATIENT)
Dept: INTERNAL MEDICINE CLINIC | Facility: CLINIC | Age: 84
End: 2019-04-15
Payer: MEDICARE

## 2019-04-15 VITALS
DIASTOLIC BLOOD PRESSURE: 86 MMHG | HEART RATE: 56 BPM | WEIGHT: 172 LBS | BODY MASS INDEX: 31.65 KG/M2 | OXYGEN SATURATION: 97 % | HEIGHT: 62 IN | SYSTOLIC BLOOD PRESSURE: 132 MMHG

## 2019-04-15 DIAGNOSIS — R79.89 ABNORMAL TSH: ICD-10-CM

## 2019-04-15 DIAGNOSIS — I10 ESSENTIAL HYPERTENSION: Chronic | ICD-10-CM

## 2019-04-15 DIAGNOSIS — E78.2 MIXED HYPERLIPIDEMIA: Chronic | ICD-10-CM

## 2019-04-15 DIAGNOSIS — E11.9 TYPE 2 DIABETES MELLITUS WITHOUT COMPLICATION, WITHOUT LONG-TERM CURRENT USE OF INSULIN (HCC): Primary | Chronic | ICD-10-CM

## 2019-04-15 DIAGNOSIS — E66.9 OBESITY (BMI 30-39.9): ICD-10-CM

## 2019-04-15 PROBLEM — Z87.19 HISTORY OF APPENDICITIS: Status: RESOLVED | Noted: 2019-02-04 | Resolved: 2019-04-15

## 2019-04-15 PROCEDURE — 99214 OFFICE O/P EST MOD 30 MIN: CPT | Performed by: INTERNAL MEDICINE

## 2019-06-25 LAB
LEFT EYE DIABETIC RETINOPATHY: NORMAL
RIGHT EYE DIABETIC RETINOPATHY: NORMAL

## 2019-08-30 DIAGNOSIS — E78.2 MIXED HYPERLIPIDEMIA: ICD-10-CM

## 2019-08-30 RX ORDER — PRAVASTATIN SODIUM 20 MG
20 TABLET ORAL DAILY
Qty: 90 TABLET | Refills: 0 | Status: SHIPPED | OUTPATIENT
Start: 2019-08-30 | End: 2019-11-24 | Stop reason: SDUPTHER

## 2019-11-24 DIAGNOSIS — E78.2 MIXED HYPERLIPIDEMIA: ICD-10-CM

## 2019-11-25 RX ORDER — PRAVASTATIN SODIUM 20 MG
TABLET ORAL
Qty: 90 TABLET | Refills: 0 | Status: SHIPPED | OUTPATIENT
Start: 2019-11-25 | End: 2019-12-26 | Stop reason: SDUPTHER

## 2019-12-07 DIAGNOSIS — I10 ESSENTIAL HYPERTENSION: ICD-10-CM

## 2019-12-07 RX ORDER — METOPROLOL SUCCINATE 100 MG/1
TABLET, EXTENDED RELEASE ORAL
Qty: 90 TABLET | Refills: 0 | Status: SHIPPED | OUTPATIENT
Start: 2019-12-07 | End: 2019-12-26 | Stop reason: SDUPTHER

## 2019-12-14 DIAGNOSIS — I10 ESSENTIAL HYPERTENSION: ICD-10-CM

## 2019-12-15 DIAGNOSIS — I10 ESSENTIAL HYPERTENSION: ICD-10-CM

## 2019-12-16 DIAGNOSIS — E11.9 TYPE 2 DIABETES MELLITUS WITHOUT COMPLICATION, WITHOUT LONG-TERM CURRENT USE OF INSULIN (HCC): ICD-10-CM

## 2019-12-16 RX ORDER — HYDROCHLOROTHIAZIDE 12.5 MG/1
TABLET ORAL
Qty: 90 TABLET | Refills: 0 | Status: SHIPPED | OUTPATIENT
Start: 2019-12-16 | End: 2019-12-26 | Stop reason: SDUPTHER

## 2019-12-16 RX ORDER — AMLODIPINE BESYLATE 5 MG/1
TABLET ORAL
Qty: 90 TABLET | Refills: 0 | Status: SHIPPED | OUTPATIENT
Start: 2019-12-16 | End: 2019-12-26 | Stop reason: SDUPTHER

## 2019-12-16 RX ORDER — METFORMIN HYDROCHLORIDE 500 MG/1
TABLET, EXTENDED RELEASE ORAL
Qty: 180 TABLET | Refills: 0 | Status: SHIPPED | OUTPATIENT
Start: 2019-12-16 | End: 2019-12-26 | Stop reason: SDUPTHER

## 2019-12-16 RX ORDER — LISINOPRIL 40 MG/1
TABLET ORAL
Qty: 90 TABLET | Refills: 0 | Status: SHIPPED | OUTPATIENT
Start: 2019-12-16 | End: 2019-12-26 | Stop reason: SDUPTHER

## 2019-12-21 ENCOUNTER — TELEPHONE (OUTPATIENT)
Dept: OTHER | Facility: OTHER | Age: 84
End: 2019-12-21

## 2019-12-23 ENCOUNTER — APPOINTMENT (OUTPATIENT)
Dept: LAB | Facility: CLINIC | Age: 84
End: 2019-12-23
Payer: MEDICARE

## 2019-12-23 DIAGNOSIS — E11.9 TYPE 2 DIABETES MELLITUS WITHOUT COMPLICATION, WITHOUT LONG-TERM CURRENT USE OF INSULIN (HCC): Chronic | ICD-10-CM

## 2019-12-23 DIAGNOSIS — E78.2 MIXED HYPERLIPIDEMIA: Chronic | ICD-10-CM

## 2019-12-23 LAB
ALBUMIN SERPL BCP-MCNC: 3.6 G/DL (ref 3.5–5)
ALP SERPL-CCNC: 63 U/L (ref 46–116)
ALT SERPL W P-5'-P-CCNC: 18 U/L (ref 12–78)
ANION GAP SERPL CALCULATED.3IONS-SCNC: 3 MMOL/L (ref 4–13)
AST SERPL W P-5'-P-CCNC: 11 U/L (ref 5–45)
BASOPHILS # BLD AUTO: 0.02 THOUSANDS/ΜL (ref 0–0.1)
BASOPHILS NFR BLD AUTO: 0 % (ref 0–1)
BILIRUB SERPL-MCNC: 0.47 MG/DL (ref 0.2–1)
BUN SERPL-MCNC: 12 MG/DL (ref 5–25)
CALCIUM SERPL-MCNC: 9.8 MG/DL (ref 8.3–10.1)
CHLORIDE SERPL-SCNC: 104 MMOL/L (ref 100–108)
CHOLEST SERPL-MCNC: 139 MG/DL (ref 50–200)
CO2 SERPL-SCNC: 29 MMOL/L (ref 21–32)
CREAT SERPL-MCNC: 0.82 MG/DL (ref 0.6–1.3)
CREAT UR-MCNC: 52.1 MG/DL
EOSINOPHIL # BLD AUTO: 0.16 THOUSAND/ΜL (ref 0–0.61)
EOSINOPHIL NFR BLD AUTO: 2 % (ref 0–6)
ERYTHROCYTE [DISTWIDTH] IN BLOOD BY AUTOMATED COUNT: 15.8 % (ref 11.6–15.1)
EST. AVERAGE GLUCOSE BLD GHB EST-MCNC: 140 MG/DL
GFR SERPL CREATININE-BSD FRML MDRD: 77 ML/MIN/1.73SQ M
GLUCOSE P FAST SERPL-MCNC: 117 MG/DL (ref 65–99)
HBA1C MFR BLD: 6.5 % (ref 4.2–6.3)
HCT VFR BLD AUTO: 41.8 % (ref 34.8–46.1)
HDLC SERPL-MCNC: 45 MG/DL
HGB BLD-MCNC: 13 G/DL (ref 11.5–15.4)
IMM GRANULOCYTES # BLD AUTO: 0.07 THOUSAND/UL (ref 0–0.2)
IMM GRANULOCYTES NFR BLD AUTO: 1 % (ref 0–2)
LDLC SERPL CALC-MCNC: 59 MG/DL (ref 0–100)
LYMPHOCYTES # BLD AUTO: 2.86 THOUSANDS/ΜL (ref 0.6–4.47)
LYMPHOCYTES NFR BLD AUTO: 27 % (ref 14–44)
MCH RBC QN AUTO: 25.4 PG (ref 26.8–34.3)
MCHC RBC AUTO-ENTMCNC: 31.1 G/DL (ref 31.4–37.4)
MCV RBC AUTO: 82 FL (ref 82–98)
MICROALBUMIN UR-MCNC: 251 MG/L (ref 0–20)
MICROALBUMIN/CREAT 24H UR: 482 MG/G CREATININE (ref 0–30)
MONOCYTES # BLD AUTO: 0.79 THOUSAND/ΜL (ref 0.17–1.22)
MONOCYTES NFR BLD AUTO: 7 % (ref 4–12)
NEUTROPHILS # BLD AUTO: 6.8 THOUSANDS/ΜL (ref 1.85–7.62)
NEUTS SEG NFR BLD AUTO: 63 % (ref 43–75)
NONHDLC SERPL-MCNC: 94 MG/DL
NRBC BLD AUTO-RTO: 0 /100 WBCS
PLATELET # BLD AUTO: 334 THOUSANDS/UL (ref 149–390)
PMV BLD AUTO: 9.2 FL (ref 8.9–12.7)
POTASSIUM SERPL-SCNC: 4 MMOL/L (ref 3.5–5.3)
PROT SERPL-MCNC: 8.4 G/DL (ref 6.4–8.2)
RBC # BLD AUTO: 5.12 MILLION/UL (ref 3.81–5.12)
SODIUM SERPL-SCNC: 136 MMOL/L (ref 136–145)
TRIGL SERPL-MCNC: 174 MG/DL
TSH SERPL DL<=0.05 MIU/L-ACNC: 1.01 UIU/ML (ref 0.36–3.74)
WBC # BLD AUTO: 10.7 THOUSAND/UL (ref 4.31–10.16)

## 2019-12-23 PROCEDURE — 83036 HEMOGLOBIN GLYCOSYLATED A1C: CPT

## 2019-12-23 PROCEDURE — 80053 COMPREHEN METABOLIC PANEL: CPT

## 2019-12-23 PROCEDURE — 84443 ASSAY THYROID STIM HORMONE: CPT

## 2019-12-23 PROCEDURE — 36415 COLL VENOUS BLD VENIPUNCTURE: CPT

## 2019-12-23 PROCEDURE — 85025 COMPLETE CBC W/AUTO DIFF WBC: CPT

## 2019-12-23 PROCEDURE — 82043 UR ALBUMIN QUANTITATIVE: CPT

## 2019-12-23 PROCEDURE — 80061 LIPID PANEL: CPT

## 2019-12-23 PROCEDURE — 82570 ASSAY OF URINE CREATININE: CPT

## 2019-12-26 ENCOUNTER — OFFICE VISIT (OUTPATIENT)
Dept: INTERNAL MEDICINE CLINIC | Facility: CLINIC | Age: 84
End: 2019-12-26
Payer: MEDICARE

## 2019-12-26 VITALS
OXYGEN SATURATION: 98 % | DIASTOLIC BLOOD PRESSURE: 82 MMHG | SYSTOLIC BLOOD PRESSURE: 138 MMHG | HEIGHT: 62 IN | WEIGHT: 179.8 LBS | HEART RATE: 82 BPM | BODY MASS INDEX: 33.09 KG/M2

## 2019-12-26 DIAGNOSIS — E11.9 TYPE 2 DIABETES MELLITUS WITHOUT COMPLICATION, WITHOUT LONG-TERM CURRENT USE OF INSULIN (HCC): Primary | ICD-10-CM

## 2019-12-26 DIAGNOSIS — E78.2 MIXED HYPERLIPIDEMIA: ICD-10-CM

## 2019-12-26 DIAGNOSIS — I10 ESSENTIAL HYPERTENSION: ICD-10-CM

## 2019-12-26 DIAGNOSIS — Z23 NEED FOR INFLUENZA VACCINATION: ICD-10-CM

## 2019-12-26 DIAGNOSIS — Z00.00 MEDICARE ANNUAL WELLNESS VISIT, SUBSEQUENT: ICD-10-CM

## 2019-12-26 DIAGNOSIS — Z23 NEED FOR VACCINATION: ICD-10-CM

## 2019-12-26 PROBLEM — R79.89 ABNORMAL TSH: Status: RESOLVED | Noted: 2017-10-16 | Resolved: 2019-12-26

## 2019-12-26 PROCEDURE — G0009 ADMIN PNEUMOCOCCAL VACCINE: HCPCS | Performed by: INTERNAL MEDICINE

## 2019-12-26 PROCEDURE — G0439 PPPS, SUBSEQ VISIT: HCPCS | Performed by: INTERNAL MEDICINE

## 2019-12-26 PROCEDURE — 99214 OFFICE O/P EST MOD 30 MIN: CPT | Performed by: INTERNAL MEDICINE

## 2019-12-26 PROCEDURE — 90662 IIV NO PRSV INCREASED AG IM: CPT | Performed by: INTERNAL MEDICINE

## 2019-12-26 PROCEDURE — 90670 PCV13 VACCINE IM: CPT | Performed by: INTERNAL MEDICINE

## 2019-12-26 PROCEDURE — G0008 ADMIN INFLUENZA VIRUS VAC: HCPCS | Performed by: INTERNAL MEDICINE

## 2019-12-26 RX ORDER — LISINOPRIL 40 MG/1
40 TABLET ORAL DAILY
Qty: 90 TABLET | Refills: 2 | Status: SHIPPED | OUTPATIENT
Start: 2019-12-26 | End: 2020-01-31

## 2019-12-26 RX ORDER — METFORMIN HYDROCHLORIDE 500 MG/1
500 TABLET, EXTENDED RELEASE ORAL 2 TIMES DAILY
Qty: 180 TABLET | Refills: 2 | Status: SHIPPED | OUTPATIENT
Start: 2019-12-26 | End: 2020-06-20 | Stop reason: SDUPTHER

## 2019-12-26 RX ORDER — PRAVASTATIN SODIUM 20 MG
20 TABLET ORAL DAILY
Qty: 90 TABLET | Refills: 2 | Status: SHIPPED | OUTPATIENT
Start: 2019-12-26 | End: 2020-06-20 | Stop reason: SDUPTHER

## 2019-12-26 RX ORDER — HYDROCHLOROTHIAZIDE 12.5 MG/1
12.5 TABLET ORAL DAILY
Qty: 90 TABLET | Refills: 2 | Status: SHIPPED | OUTPATIENT
Start: 2019-12-26 | End: 2020-06-20 | Stop reason: SDUPTHER

## 2019-12-26 RX ORDER — AMLODIPINE BESYLATE 5 MG/1
5 TABLET ORAL DAILY
Qty: 90 TABLET | Refills: 2 | Status: SHIPPED | OUTPATIENT
Start: 2019-12-26 | End: 2020-01-31 | Stop reason: SDUPTHER

## 2019-12-26 RX ORDER — METOPROLOL SUCCINATE 100 MG/1
100 TABLET, EXTENDED RELEASE ORAL DAILY
Qty: 90 TABLET | Refills: 2 | Status: SHIPPED | OUTPATIENT
Start: 2019-12-26 | End: 2020-06-20 | Stop reason: SDUPTHER

## 2019-12-26 NOTE — PROGRESS NOTES
Assessment and Plan:     Problem List Items Addressed This Visit        Endocrine    Type 2 diabetes mellitus without complication, without long-term current use of insulin ([facility]) - Primary (Chronic)    Relevant Medications    metFORMIN (GLUCOPHAGE-XR) 500 mg 24 hr tablet    Other Relevant Orders    CBC and differential    Comprehensive metabolic panel    TSH, 3rd generation    Hemoglobin A1C    Microalbumin / creatinine urine ratio       Cardiovascular and Mediastinum    Essential hypertension (Chronic)    Relevant Medications    amLODIPine (NORVASC) 5 mg tablet    hydrochlorothiazide (HYDRODIURIL) 12 5 mg tablet    lisinopril (ZESTRIL) 40 mg tablet    metoprolol succinate (TOPROL-XL) 100 mg 24 hr tablet       Other    Mixed hyperlipidemia (Chronic)    Relevant Medications    pravastatin (PRAVACHOL) 20 mg tablet    Other Relevant Orders    Lipid panel      Other Visit Diagnoses     Medicare annual wellness visit, subsequent        Need for influenza vaccination        Relevant Orders    influenza vaccine, [id number], high-dose, PF 0 5 mL (FLUZONE HIGH-DOSE)    Need for vaccination        Relevant Orders    PNEUMOCOCCAL CONJUGATE VACCINE 13-VALENT GREATER THAN 6 MONTHS           Preventive health issues were discussed with patient, and age appropriate screening tests were ordered as noted in patient's After Visit Summary  Personalized health advice and appropriate referrals for health education or preventive services given if needed, as noted in patient's After Visit Summary       History of Present Illness:     Patient presents for Medicare Annual Wellness visit    Patient Care Team:  Dolores Martins MD as PCP - Joleen Mosquera MD (General Surgery)  Aure Keyes MD as Endoscopist     Problem List:     Patient Active Problem List   Diagnosis    Type 2 diabetes mellitus without complication, without long-term current use of insulin ([facility] )    Mixed hyperlipidemia    Leukocytosis    Colitis    Essential hypertension    Back pain    Obesity (BMI 30-39  9)      Past Medical and Surgical History:     Past Medical History:   Diagnosis Date    Abnormal TSH 10/16/2017    [facility]     GERD (gastroesophageal reflux disease)     Hyperlipidemia     Hypertension     Perforated appendicitis 2018    Sepsis [facility] ) 2018     Past Surgical History:   Procedure Laterality Date     SECTION      CHOLECYSTECTOMY      NECK SURGERY      DC COLONOSCOPY FLX DX W/COLLJ SPEC WHEN PFRMD N/A 3/1/2018    Procedure: COLONOSCOPY;  Surgeon: Victoriano Peck MD;  Location: [facility]; Service: Gastroenterology      Family History:     Family History   Problem Relation Age of Onset    No Known Problems Mother     No Known Problems Father       Social History:     Social History     Socioeconomic History    Marital status:       Spouse name: None    Number of children: None    Years of education: None    Highest education level: None   Occupational History    None   Social Needs    Financial resource strain: None    Food insecurity:     Worry: None     Inability: None    Transportation needs:     Medical: None     Non-medical: None   Tobacco Use    Smoking status: Never Smoker    Smokeless tobacco: Never Used   Substance and Sexual Activity    Alcohol use: No    Drug use: No    Sexual activity: Not Currently   Lifestyle    Physical activity:     Days per week: 0 days     Minutes per session: 0 min    Stress: None   Relationships    Social connections:     Talks on phone: None     Gets together: None     Attends Taoist service: None     Active member of club or organization: None     Attends meetings of clubs or organizations: None     Relationship status: None    Intimate partner violence:     Fear of current or ex partner: None     Emotionally abused: None     Physically abused: None     Forced sexual activity: None   Other Topics Concern    None   Social History Narrative    No advance directive on file       Medications and Allergies:     Current Outpatient Medications   Medication Sig Dispense Refill    amLODIPine (NORVASC) 5 mg tablet Take 1 tablet (5 mg total) by mouth daily 90 tablet 2    Calcium Carbonate-Vitamin D3 (CALCIUM 600/VITAMIN D) 600-400 MG-UNIT TABS Take by mouth      hydrochlorothiazide (HYDRODIURIL) 12 5 mg tablet Take 1 tablet (12 5 mg total) by mouth daily 90 tablet 2    lisinopril (ZESTRIL) 40 mg tablet Take 1 tablet (40 mg total) by mouth daily 90 tablet 2    metFORMIN (GLUCOPHAGE-XR) 500 mg 24 hr tablet Take 1 tablet (500 mg total) by mouth 2 (two) times a day 180 tablet 2    metoprolol succinate (TOPROL-XL) 100 mg 24 hr tablet Take 1 tablet (100 mg total) by mouth daily 90 tablet 2    pravastatin (PRAVACHOL) 20 mg tablet Take 1 tablet (20 mg total) by mouth daily 90 tablet 2     No current facility-administered medications for this visit  No Known Allergies   Immunizations:     Immunization History   Administered Date(s) Administered    Influenza Split High Dose Preservative Free IM 10/02/2017    Influenza, high dose seasonal 0 5 mL 10/08/2018      Health Maintenance:         Topic Date Due    DXA SCAN  11/13/2019    Hepatitis C Screening  Completed         Topic Date Due    DTaP,Tdap,and Td Vaccines (1 - Tdap) 05/07/1947    Hepatitis B Vaccine (1 of 3 - Risk 3-dose series) 05/07/1955    Pneumococcal Vaccine: 65+ Years (1 of 2 - PCV13) 05/07/2001    Influenza Vaccine  07/01/2019      Medicare Health Risk Assessment:     /82   Pulse 82   Ht 5' 2" (1 575 m)   Wt 81 6 kg (179 lb 12 8 oz)   SpO2 98%   BMI 32 89 kg/m²      Kristin Encarnacion is here for her Subsequent Wellness visit  Health Risk Assessment:   Patient rates overall health as fair  Patient feels that their physical health rating is same  Eyesight was rated as same  Hearing was rated as same  Patient feels that their emotional and mental health rating is much better  Pain experienced in the last 7 days has been none  Depression Screening:   PHQ-2 Score: 1      Fall Risk Screening: In the past year, patient has experienced: no history of falling in past year      Urinary Incontinence Screening:   Patient has not leaked urine accidently in the last six months  Home Safety:  Patient does not have trouble with stairs inside or outside of their home  Patient has working smoke alarms and has working carbon monoxide detector  Home safety hazards include: none  Nutrition:   Current diet is Regular  Medications:   Patient is currently taking over-the-counter supplements  OTC medications include: see medication list  Patient is able to manage medications  Activities of Daily Living (ADLs)/Instrumental Activities of Daily Living (IADLs):   Walk and transfer into and out of bed and chair?: Yes  Dress and groom yourself?: Yes    Bathe or shower yourself?: Yes    Feed yourself?  Yes  Do your laundry/housekeeping?: Yes  Manage your money, pay your bills and track your expenses?: Yes  Make your own meals?: Yes    Do your own shopping?: Yes    Previous Hospitalizations:   Any hospitalizations or ED visits within the last 12 months?: No      Advance Care Planning:   Living will: No    End of Life Decisions reviewed with patient: Yes      Cognitive Screening:   Provider or family/friend/caregiver concerned regarding cognition?: No    PREVENTIVE SCREENINGS      Cardiovascular Screening:    General: Screening Not Indicated and History Lipid Disorder      Diabetes Screening:     General: Screening Not Indicated and History Diabetes      Colorectal Cancer Screening:     General: Screening Not Indicated      Breast Cancer Screening:     General: Screening Not Indicated      Cervical Cancer Screening:    General: Screening Not Indicated      Osteoporosis Screening:    General: Screening Not Indicated      Abdominal Aortic Aneurysm (AAA) Screening:        General: Screening Not Indicated      Lung Cancer Screening:     General: Screening Not Indicated      Hepatitis C Screening:    General: Screening Current    Other Counseling Topics:   Alcohol use counseling, car/seat belt/driving safety, skin self-exam, sunscreen and calcium and vitamin D intake and regular weightbearing exercise         Won Colbert MD

## 2019-12-26 NOTE — PROGRESS NOTES
INTERNAL MEDICINE OFFICE VISIT  [facility]  [facility], [address]  Tel: [phone]      NAME: Vilma Sol  AGE: 81 y o  SEX: female  : 1935   MRN: [id number]    DATE: 2019  TIME: 9:56 AM      Assessment and Plan:  1  Type 2 diabetes mellitus without complication, without long-term current use of insulin (HCC)    Continue present medication  - metFORMIN (GLUCOPHAGE-XR) 500 mg 24 hr tablet; Take 1 tablet (500 mg total) by mouth 2 (two) times a day  Dispense: 180 tablet; Refill: 2  - CBC and differential; Future  - Comprehensive metabolic panel; Future  - TSH, 3rd generation; Future  - Hemoglobin A1C; Future  - Microalbumin / creatinine urine ratio; Future    2  Essential hypertension   continue medication at the same dose for now  She was told to cut down on the salt intake in her diet  She will continue to monitor blood pressure at home and call me in a month  - amLODIPine (NORVASC) 5 mg tablet; Take 1 tablet (5 mg total) by mouth daily  Dispense: 90 tablet; Refill: 2  - hydrochlorothiazide (HYDRODIURIL) 12 5 mg tablet; Take 1 tablet (12 5 mg total) by mouth daily  Dispense: 90 tablet; Refill: 2  - lisinopril (ZESTRIL) 40 mg tablet; Take 1 tablet (40 mg total) by mouth daily  Dispense: 90 tablet; Refill: 2  - metoprolol succinate (TOPROL-XL) 100 mg 24 hr tablet; Take 1 tablet (100 mg total) by mouth daily  Dispense: 90 tablet; Refill: 2    3  Mixed hyperlipidemia   continue pravastatin  - pravastatin (PRAVACHOL) 20 mg tablet; Take 1 tablet (20 mg total) by mouth daily  Dispense: 90 tablet; Refill: 2  - Lipid panel; Future    4  Medicare annual wellness visit, subsequent      5  Need for influenza vaccination    - influenza vaccine, [id number], high-dose, PF 0 5 mL (FLUZONE HIGH-DOSE)    6   Need for vaccination    - PNEUMOCOCCAL CONJUGATE VACCINE 13-VALENT GREATER THAN 6 MONTHS      - Counseling Documentation: patient was counseled regarding: diagnostic results, instructions for management, risk factor reductions, prognosis, patient and family education, impressions, risks and benefits of treatment options and importance of compliance with treatment  - Medication Side Effects: Adverse side effects of medications were reviewed with the patient/guardian today  Return for follow up visit in  6 months or earlier, if needed  Chief Complaint:  Chief Complaint   Patient presents with    Medicare Wellness Visit         History of Present Illness:    type 2 diabetes - very well controlled with a hemoglobin A1c of 6 5   Hypertension-today her blood pressure is on the higher side as she has been eating a lot of salty food  Hyperlipidemia - lipid profile is stable on pravastatin  She gained about 7 lb in the last 6 months because she has not been eating well      Active Problem List:  Patient Active Problem List   Diagnosis    Type 2 diabetes mellitus without complication, without long-term current use of insulin ([facility] [address] )    Mixed hyperlipidemia    Leukocytosis    Colitis    Essential hypertension    Back pain    Obesity (BMI 30-39  9)         Past Medical History:  Past Medical History:   Diagnosis Date    Abnormal TSH 10/16/2017    Diabetes mellitus ([facility] [address] )     GERD (gastroesophageal reflux disease)     Hyperlipidemia     Hypertension     Perforated appendicitis 2018    Sepsis ([address] ) 2018         Past Surgical History:  Past Surgical History:   Procedure Laterality Date     SECTION      CHOLECYSTECTOMY      NECK SURGERY      NE COLONOSCOPY FLX DX W/COLLJ SPEC WHEN PFRMD N/A 3/1/2018    Procedure: COLONOSCOPY;  Surgeon: Aure Keyes MD;  Location: [facility]; Service: Gastroenterology         Family History:  Family History   Problem Relation Age of Onset    No Known Problems Mother     No Known Problems Father          Social History:  Social History     Socioeconomic History    Marital status:   Spouse name: None    Number of children: None    Years of education: None    Highest education level: None   Occupational History    None   Social Needs    Financial resource strain: None    Food insecurity:     Worry: None     Inability: None    Transportation needs:     Medical: None     Non-medical: None   Tobacco Use    Smoking status: Never Smoker    Smokeless tobacco: Never Used   Substance and Sexual Activity    Alcohol use: No    Drug use: No    Sexual activity: Not Currently   Lifestyle    Physical activity:     Days per week: 0 days     Minutes per session: 0 min    Stress: None   Relationships    Social connections:     Talks on phone: None     Gets together: None     Attends Religion service: None     Active member of club or organization: None     Attends meetings of clubs or organizations: None     Relationship status: None    Intimate partner violence:     Fear of current or ex partner: None     Emotionally abused: None     Physically abused: None     Forced sexual activity: None   Other Topics Concern    None   Social History Narrative    No advance directive on file         Allergies:  No Known Allergies      Medications:    Current Outpatient Medications:     amLODIPine (NORVASC) 5 mg tablet, Take 1 tablet (5 mg total) by mouth daily, Disp: 90 tablet, Rfl: 2    Calcium Carbonate-Vitamin D3 (CALCIUM 600/VITAMIN D) 600-400 MG-UNIT TABS, Take by mouth, Disp: , Rfl:     hydrochlorothiazide (HYDRODIURIL) 12 5 mg tablet, Take 1 tablet (12 5 mg total) by mouth daily, Disp: 90 tablet, Rfl: 2    lisinopril (ZESTRIL) 40 mg tablet, Take 1 tablet (40 mg total) by mouth daily, Disp: 90 tablet, Rfl: 2    metFORMIN (GLUCOPHAGE-XR) 500 mg 24 hr tablet, Take 1 tablet (500 mg total) by mouth 2 (two) times a day, Disp: 180 tablet, Rfl: 2    metoprolol succinate (TOPROL-XL) 100 mg 24 hr tablet, Take 1 tablet (100 mg total) by mouth daily, Disp: 90 tablet, Rfl: 2    pravastatin (PRAVACHOL) 20 mg tablet, Take 1 tablet (20 mg total) by mouth daily, Disp: 90 tablet, Rfl: 2      The following portions of the patient's history were reviewed and updated as appropriate: past medical history, past surgical history, family history, social history, allergies, current medications and active problem list       Review of Systems:  Constitutional: Denies fever, chills, weight gain, weight loss, fatigue  Eyes: Denies eye redness, eye discharge, double vision, change in visual acuity  ENT: Denies hearing loss, tinnitus, sneezing, nasal congestion, nasal discharge, sore throat   Respiratory: Denies cough, expectoration, hemoptysis, shortness of breath, wheezing  Cardiovascular: Denies chest pain, palpitations, lower extremity swelling, orthopnea, PND  Gastrointestinal: Denies abdominal pain, heartburn, nausea, vomiting, hematemesis, diarrhea, bloody stools  Genito-Urinary: Denies dysuria, frequency, difficulty in micturition, nocturia, incontinence  Musculoskeletal: Denies back pain, joint pain, muscle pain  Neurologic: Denies confusion, lightheadedness, syncope, headache, focal weakness, sensory changes, seizures  Endocrine: Denies polyuria, polydipsia, temperature intolerance  Allergy and Immunology: Denies hives, insect bite sensitivity  Hematological and Lymphatic: Denies bleeding problems, swollen glands   Psychological: Denies depression, suicidal ideation, anxiety, panic, mood swings  Dermatological: Denies pruritus, rash, skin lesion changes      Vitals:  Vitals:    12/26/19 0934   BP: 138/82   Pulse:    SpO2:        Body mass index is 32 89 kg/m²  Weight (last 2 days)     Date/Time   Weight    12/26/19 0914   81 6 (179 8)                Physical Examination:  General: Patient is not in acute distress  Awake, alert, responding to commands  No weight gain or loss  Head: Normocephalic  Atraumatic  Eyes: Conjunctiva and lids with no swelling, erythema or discharge   Both pupils normal sized, round and reactive to light  Sclera nonicteric  ENT: External examination of nose and ear normal  Otoscopic examination shows translucent tympanic membranes with patent canals without erythema  Oropharynx moist with no erythema, edema, exudate or lesions  Neck: Supple  JVP not raised  Trachea midline  No masses  No thyromegaly  Lungs: No signs of increased work of breathing or respiratory distress  Bilateral bronchovascular breath sounds with no crackles or rhonchi  Chest wall: No tenderness  Cardiovascular: Normal PMI  No thrills  Regular rate and rhythm  S1 and S2 normal  No murmur, rub or gallop  Gastrointestinal: Abdomen soft, nontender  No guarding or rigidity  Liver and spleen not palpable  Bowel sounds present  Neurologic: Cranial nerves II-XII intact   Cortical functions normal  Motor system - Reflexes 2+ and symmetrical  Sensations normal  Musculoskeletal: Gait normal  No joint tenderness  Integumentary: Skin normal with no rash or lesions  Lymphatic: No palpable lymph nodes in neck, axilla or groin  Extremities: No clubbing, cyanosis, edema or varicosities  Psychological: Judgement and insight normal  Mood and affect normal      Laboratory Results:  CBC with diff:   Lab Results   Component Value Date    WBC 10 70 (H) 12/23/2019    RBC 5 12 12/23/2019    HGB 13 0 12/23/2019    HCT 41 8 12/23/2019    MCV 82 12/23/2019    MCH 25 4 (L) 12/23/2019    RDW 15 8 (H) 12/23/2019     12/23/2019       CMP:  Lab Results   Component Value Date    CREATININE 0 82 12/23/2019    BUN 12 12/23/2019    K 4 0 12/23/2019     12/23/2019    CO2 29 12/23/2019    ALKPHOS 63 12/23/2019    ALT 18 12/23/2019    AST 11 12/23/2019       Lab Results   Component Value Date    HGBA1C 6 5 (H) 12/23/2019    MG 2 0 07/04/2018    PHOS 2 7 07/01/2018       No results found for: TROPONINI, CKMB, CKTOTAL    Lipid Profile:   No results found for: CHOL  Lab Results   Component Value Date    HDL 45 12/23/2019    HDL 49 04/08/2019     Lab Results   Component Value Date    LDLCALC 59 12/23/2019    LDLCALC 60 04/08/2019     Lab Results   Component Value Date    TRIG 174 (H) 12/23/2019    TRIG 172 (H) 04/08/2019       Imaging Results:  Mammo screening bilateral w 3d & cad  Patient History:  Patient is postmenopausal and had first child at age 33  Patient's BMI is 29 3  Reason for exam: screening, asymptomatic  Screening    Mammo Screening Bilateral W DBT and CAD: November 13, 2017 -   Check In #: [de-identified]  2D/3D Procedure  3D views: Bilateral MLO view(s) were taken  2D views: Bilateral MLO and CC view(s) were taken  CV view(s)   were taken of the left breast     Technologist: ALVIN Saha  Prior study comparison: June 25, 2016, bilateral screening   mammogram, performed at [facility]  October 21, 2014,   bilateral screening mammogram, performed at [facility]  March 20, 2013, bilateral screening mammogram, performed at   [facility]  December 5, 2011, bilateral screening   mammogram, performed at [facility]  December 15, 2010,   bilateral screening mammogram, performed at [facility]  There are scattered fibroglandular densities  Bilateral digital   mammography is performed  No dominant soft tissue mass,   architectural distortion or suspicious calcifications are noted  The skin and nipple contours are within normal limits  Left   lateral breast nodule is unchanged  No evidence of malignancy  No  significant changes when compared to prior studies  IMPRESSION:    1  No evidence of malignancy  2   No significant change when compared with the prior study  ACR BI-RADS® Assessments: BiRad:2 - Benign    Recommendation:  Routine screening mammogram of both breasts in 1 year  The patient is scheduled in a reminder system for screening   mammography  8-10% of cancers will be missed on mammography  Management of a   palpable abnormality must be based on clinical grounds   Patients   will be notified of their results via letter from our facility  Accredited by [employer] of [employer] and FDA      Transcription Location: [facility]: [id number]    Risk Value(s):  Tyrer-Cuzick 10 Year: 2 100%, Tyrer-Cuzick Lifetime: 2 100%,   Myriad Table: 1 5%, NATHAN 5 Year: 1 2%, NCI Lifetime: 1 8%       Health Maintenance:  Health Maintenance   Topic Date Due    DTaP,Tdap,and Td Vaccines (1 - Tdap) 05/07/1946    Hepatitis B Vaccine (1 of 3 - Risk 3-dose series) 05/07/1954    Pneumococcal Vaccine: 65+ Years (1 of 2 - PCV13) 05/07/2001    Medicare Annual Wellness Visit (AWV)  01/30/2019    Influenza Vaccine  07/01/2019    DXA SCAN  11/13/2019    Fall Risk  04/15/2020    Depression Screening PHQ  04/15/2020    Urinary Incontinence Screening  04/15/2020    BMI: Followup Plan  04/15/2020    BMI: Adult  04/15/2020    Diabetic Foot Exam  04/15/2020    HEMOGLOBIN A1C  06/23/2020    DM Eye Exam  06/25/2020    URINE MICROALBUMIN  12/23/2020    Hepatitis C Screening  Completed    Pneumococcal Vaccine: Pediatrics (0 to 5 Years) and At-Risk Patients (6 to 59 Years)  Aged Out    HIB Vaccine  Aged Out    IPV Vaccine  Aged Out    Hepatitis A Vaccine  Aged Out    Meningococcal ACWY Vaccine  Aged Out    HPV Vaccine  Aged [employer] History   Administered Date(s) Administered    Influenza Split High Dose Preservative Free IM 10/02/2017    Influenza, high dose seasonal 0 5 mL 10/08/2018, 12/26/2019    Pneumococcal Conjugate 13-Valent 12/26/2019         Laura Lo MD  12/26/2019,9:56 AM

## 2019-12-30 ENCOUNTER — HOSPITAL ENCOUNTER (INPATIENT)
Facility: HOSPITAL | Age: 84
LOS: 1 days | Discharge: HOME | DRG: 392 | End: 2020-01-01
Attending: EMERGENCY MEDICINE | Admitting: STUDENT IN AN ORGANIZED HEALTH CARE EDUCATION/TRAINING PROGRAM
Payer: MEDICARE

## 2019-12-30 ENCOUNTER — APPOINTMENT (EMERGENCY)
Dept: RADIOLOGY | Facility: HOSPITAL | Age: 84
DRG: 392 | End: 2019-12-30
Payer: MEDICARE

## 2019-12-30 ENCOUNTER — APPOINTMENT (EMERGENCY)
Dept: CT IMAGING | Facility: HOSPITAL | Age: 84
DRG: 392 | End: 2019-12-30
Payer: MEDICARE

## 2019-12-30 DIAGNOSIS — R11.2 NAUSEA & VOMITING: Primary | ICD-10-CM

## 2019-12-30 DIAGNOSIS — E86.0 DEHYDRATION: ICD-10-CM

## 2019-12-30 DIAGNOSIS — D72.829 LEUKOCYTOSIS: ICD-10-CM

## 2019-12-30 DIAGNOSIS — K52.9 ENTERITIS: ICD-10-CM

## 2019-12-30 LAB
ALBUMIN SERPL BCP-MCNC: 3.7 G/DL (ref 3.5–5)
ALP SERPL-CCNC: 69 U/L (ref 46–116)
ALT SERPL W P-5'-P-CCNC: 16 U/L (ref 12–78)
ANION GAP SERPL CALCULATED.3IONS-SCNC: 12 MMOL/L (ref 4–13)
AST SERPL W P-5'-P-CCNC: 15 U/L (ref 5–45)
BACTERIA UR QL AUTO: ABNORMAL /HPF
BASOPHILS # BLD AUTO: 0.03 THOUSANDS/ΜL (ref 0–0.1)
BASOPHILS NFR BLD AUTO: 0 % (ref 0–1)
BILIRUB SERPL-MCNC: 0.4 MG/DL (ref 0.2–1)
BILIRUB UR QL STRIP: NEGATIVE
BUN SERPL-MCNC: 15 MG/DL (ref 5–25)
CALCIUM SERPL-MCNC: 9.7 MG/DL (ref 8.3–10.1)
CHLORIDE SERPL-SCNC: 98 MMOL/L (ref 100–108)
CLARITY UR: CLEAR
CO2 SERPL-SCNC: 27 MMOL/L (ref 21–32)
COLOR UR: YELLOW
CREAT SERPL-MCNC: 0.79 MG/DL (ref 0.6–1.3)
EOSINOPHIL # BLD AUTO: 0.01 THOUSAND/ΜL (ref 0–0.61)
EOSINOPHIL NFR BLD AUTO: 0 % (ref 0–6)
ERYTHROCYTE [DISTWIDTH] IN BLOOD BY AUTOMATED COUNT: 15.2 % (ref 11.6–15.1)
GFR SERPL CREATININE-BSD FRML MDRD: 80 ML/MIN/1.73SQ M
GLUCOSE SERPL-MCNC: 165 MG/DL (ref 65–140)
GLUCOSE SERPL-MCNC: 170 MG/DL (ref 65–140)
GLUCOSE UR STRIP-MCNC: NEGATIVE MG/DL
HCT VFR BLD AUTO: 43 % (ref 34.8–46.1)
HGB BLD-MCNC: 14.2 G/DL (ref 11.5–15.4)
HGB UR QL STRIP.AUTO: NEGATIVE
IMM GRANULOCYTES # BLD AUTO: 0.11 THOUSAND/UL (ref 0–0.2)
IMM GRANULOCYTES NFR BLD AUTO: 1 % (ref 0–2)
KETONES UR STRIP-MCNC: NEGATIVE MG/DL
LACTATE SERPL-SCNC: 1.6 MMOL/L (ref 0.5–2)
LACTATE SERPL-SCNC: 2.3 MMOL/L (ref 0.5–2)
LEUKOCYTE ESTERASE UR QL STRIP: NEGATIVE
LIPASE SERPL-CCNC: 113 U/L (ref 73–393)
LYMPHOCYTES # BLD AUTO: 1.15 THOUSANDS/ΜL (ref 0.6–4.47)
LYMPHOCYTES NFR BLD AUTO: 6 % (ref 14–44)
MCH RBC QN AUTO: 25.8 PG (ref 26.8–34.3)
MCHC RBC AUTO-ENTMCNC: 33 G/DL (ref 31.4–37.4)
MCV RBC AUTO: 78 FL (ref 82–98)
MONOCYTES # BLD AUTO: 0.49 THOUSAND/ΜL (ref 0.17–1.22)
MONOCYTES NFR BLD AUTO: 3 % (ref 4–12)
NEUTROPHILS # BLD AUTO: 16.45 THOUSANDS/ΜL (ref 1.85–7.62)
NEUTS SEG NFR BLD AUTO: 90 % (ref 43–75)
NITRITE UR QL STRIP: NEGATIVE
NON-SQ EPI CELLS URNS QL MICRO: ABNORMAL /HPF
NRBC BLD AUTO-RTO: 0 /100 WBCS
PH UR STRIP.AUTO: 7 [PH]
PLATELET # BLD AUTO: 352 THOUSANDS/UL (ref 149–390)
PMV BLD AUTO: 8.5 FL (ref 8.9–12.7)
POTASSIUM SERPL-SCNC: 3.6 MMOL/L (ref 3.5–5.3)
PROT SERPL-MCNC: 8.6 G/DL (ref 6.4–8.2)
PROT UR STRIP-MCNC: ABNORMAL MG/DL
RBC # BLD AUTO: 5.5 MILLION/UL (ref 3.81–5.12)
RBC #/AREA URNS AUTO: ABNORMAL /HPF
SODIUM SERPL-SCNC: 137 MMOL/L (ref 136–145)
SP GR UR STRIP.AUTO: <=1.005 (ref 1–1.03)
TROPONIN I SERPL-MCNC: <0.02 NG/ML
UROBILINOGEN UR QL STRIP.AUTO: 0.2 E.U./DL
WBC # BLD AUTO: 18.24 THOUSAND/UL (ref 4.31–10.16)
WBC #/AREA URNS AUTO: ABNORMAL /HPF

## 2019-12-30 PROCEDURE — 83690 ASSAY OF LIPASE: CPT | Performed by: EMERGENCY MEDICINE

## 2019-12-30 PROCEDURE — 99285 EMERGENCY DEPT VISIT HI MDM: CPT | Performed by: EMERGENCY MEDICINE

## 2019-12-30 PROCEDURE — 96375 TX/PRO/DX INJ NEW DRUG ADDON: CPT

## 2019-12-30 PROCEDURE — 74177 CT ABD & PELVIS W/CONTRAST: CPT

## 2019-12-30 PROCEDURE — 87040 BLOOD CULTURE FOR BACTERIA: CPT | Performed by: EMERGENCY MEDICINE

## 2019-12-30 PROCEDURE — 99285 EMERGENCY DEPT VISIT HI MDM: CPT

## 2019-12-30 PROCEDURE — 83605 ASSAY OF LACTIC ACID: CPT | Performed by: EMERGENCY MEDICINE

## 2019-12-30 PROCEDURE — 93005 ELECTROCARDIOGRAM TRACING: CPT

## 2019-12-30 PROCEDURE — 84484 ASSAY OF TROPONIN QUANT: CPT | Performed by: EMERGENCY MEDICINE

## 2019-12-30 PROCEDURE — 80053 COMPREHEN METABOLIC PANEL: CPT | Performed by: EMERGENCY MEDICINE

## 2019-12-30 PROCEDURE — 36415 COLL VENOUS BLD VENIPUNCTURE: CPT | Performed by: EMERGENCY MEDICINE

## 2019-12-30 PROCEDURE — 81001 URINALYSIS AUTO W/SCOPE: CPT | Performed by: EMERGENCY MEDICINE

## 2019-12-30 PROCEDURE — 96365 THER/PROPH/DIAG IV INF INIT: CPT

## 2019-12-30 PROCEDURE — 96361 HYDRATE IV INFUSION ADD-ON: CPT

## 2019-12-30 PROCEDURE — 85025 COMPLETE CBC W/AUTO DIFF WBC: CPT | Performed by: EMERGENCY MEDICINE

## 2019-12-30 PROCEDURE — 71045 X-RAY EXAM CHEST 1 VIEW: CPT

## 2019-12-30 PROCEDURE — 82948 REAGENT STRIP/BLOOD GLUCOSE: CPT

## 2019-12-30 RX ORDER — ONDANSETRON 2 MG/ML
4 INJECTION INTRAMUSCULAR; INTRAVENOUS ONCE
Status: COMPLETED | OUTPATIENT
Start: 2019-12-30 | End: 2019-12-30

## 2019-12-30 RX ADMIN — SODIUM CHLORIDE 1000 ML: 0.9 INJECTION, SOLUTION INTRAVENOUS at 20:37

## 2019-12-30 RX ADMIN — ONDANSETRON 4 MG: 2 INJECTION INTRAMUSCULAR; INTRAVENOUS at 20:39

## 2019-12-30 RX ADMIN — IOHEXOL 100 ML: 350 INJECTION, SOLUTION INTRAVENOUS at 21:25

## 2019-12-30 RX ADMIN — CEFEPIME HYDROCHLORIDE 2000 MG: 2 INJECTION, POWDER, FOR SOLUTION INTRAVENOUS at 22:09

## 2019-12-30 RX ADMIN — SODIUM CHLORIDE 1000 ML: 0.9 INJECTION, SOLUTION INTRAVENOUS at 22:55

## 2019-12-31 ENCOUNTER — APPOINTMENT (OUTPATIENT)
Dept: VASCULAR ULTRASOUND | Facility: HOSPITAL | Age: 84
DRG: 392 | End: 2019-12-31
Payer: MEDICARE

## 2019-12-31 PROBLEM — K52.9 ENTERITIS: Status: ACTIVE | Noted: 2019-12-31

## 2019-12-31 PROBLEM — R11.2 NON-INTRACTABLE VOMITING WITH NAUSEA: Status: ACTIVE | Noted: 2019-12-31

## 2019-12-31 PROBLEM — E87.2 LACTIC ACIDOSIS: Status: ACTIVE | Noted: 2019-12-31

## 2019-12-31 PROBLEM — E87.20 LACTIC ACIDOSIS: Status: ACTIVE | Noted: 2019-12-31

## 2019-12-31 LAB
ANION GAP SERPL CALCULATED.3IONS-SCNC: 8 MMOL/L (ref 4–13)
BASOPHILS # BLD AUTO: 0.02 THOUSANDS/ΜL (ref 0–0.1)
BASOPHILS NFR BLD AUTO: 0 % (ref 0–1)
BUN SERPL-MCNC: 14 MG/DL (ref 5–25)
CALCIUM SERPL-MCNC: 8.4 MG/DL (ref 8.3–10.1)
CHLORIDE SERPL-SCNC: 103 MMOL/L (ref 100–108)
CO2 SERPL-SCNC: 28 MMOL/L (ref 21–32)
CREAT SERPL-MCNC: 0.74 MG/DL (ref 0.6–1.3)
EOSINOPHIL # BLD AUTO: 0.04 THOUSAND/ΜL (ref 0–0.61)
EOSINOPHIL NFR BLD AUTO: 0 % (ref 0–6)
ERYTHROCYTE [DISTWIDTH] IN BLOOD BY AUTOMATED COUNT: 15.5 % (ref 11.6–15.1)
GFR SERPL CREATININE-BSD FRML MDRD: 87 ML/MIN/1.73SQ M
GLUCOSE P FAST SERPL-MCNC: 108 MG/DL (ref 65–99)
GLUCOSE SERPL-MCNC: 108 MG/DL (ref 65–140)
GLUCOSE SERPL-MCNC: 112 MG/DL (ref 65–140)
GLUCOSE SERPL-MCNC: 120 MG/DL (ref 65–140)
GLUCOSE SERPL-MCNC: 150 MG/DL (ref 65–140)
GLUCOSE SERPL-MCNC: 154 MG/DL (ref 65–140)
GLUCOSE SERPL-MCNC: 155 MG/DL (ref 65–140)
GLUCOSE SERPL-MCNC: 210 MG/DL (ref 65–140)
HCT VFR BLD AUTO: 38.6 % (ref 34.8–46.1)
HGB BLD-MCNC: 12.6 G/DL (ref 11.5–15.4)
IMM GRANULOCYTES # BLD AUTO: 0.08 THOUSAND/UL (ref 0–0.2)
IMM GRANULOCYTES NFR BLD AUTO: 1 % (ref 0–2)
LYMPHOCYTES # BLD AUTO: 2.15 THOUSANDS/ΜL (ref 0.6–4.47)
LYMPHOCYTES NFR BLD AUTO: 15 % (ref 14–44)
MAGNESIUM SERPL-MCNC: 1.6 MG/DL (ref 1.6–2.6)
MCH RBC QN AUTO: 25.8 PG (ref 26.8–34.3)
MCHC RBC AUTO-ENTMCNC: 32.6 G/DL (ref 31.4–37.4)
MCV RBC AUTO: 79 FL (ref 82–98)
MONOCYTES # BLD AUTO: 0.95 THOUSAND/ΜL (ref 0.17–1.22)
MONOCYTES NFR BLD AUTO: 7 % (ref 4–12)
NEUTROPHILS # BLD AUTO: 10.93 THOUSANDS/ΜL (ref 1.85–7.62)
NEUTS SEG NFR BLD AUTO: 77 % (ref 43–75)
NRBC BLD AUTO-RTO: 0 /100 WBCS
PLATELET # BLD AUTO: 328 THOUSANDS/UL (ref 149–390)
PMV BLD AUTO: 8.7 FL (ref 8.9–12.7)
POTASSIUM SERPL-SCNC: 3.8 MMOL/L (ref 3.5–5.3)
PROCALCITONIN SERPL-MCNC: 0.21 NG/ML
RBC # BLD AUTO: 4.88 MILLION/UL (ref 3.81–5.12)
SODIUM SERPL-SCNC: 139 MMOL/L (ref 136–145)
WBC # BLD AUTO: 14.17 THOUSAND/UL (ref 4.31–10.16)

## 2019-12-31 PROCEDURE — 36415 COLL VENOUS BLD VENIPUNCTURE: CPT | Performed by: PHYSICIAN ASSISTANT

## 2019-12-31 PROCEDURE — 83735 ASSAY OF MAGNESIUM: CPT | Performed by: PHYSICIAN ASSISTANT

## 2019-12-31 PROCEDURE — 85025 COMPLETE CBC W/AUTO DIFF WBC: CPT | Performed by: PHYSICIAN ASSISTANT

## 2019-12-31 PROCEDURE — 82948 REAGENT STRIP/BLOOD GLUCOSE: CPT

## 2019-12-31 PROCEDURE — 93975 VASCULAR STUDY: CPT

## 2019-12-31 PROCEDURE — 99283 EMERGENCY DEPT VISIT LOW MDM: CPT | Performed by: INTERNAL MEDICINE

## 2019-12-31 PROCEDURE — 80048 BASIC METABOLIC PNL TOTAL CA: CPT | Performed by: PHYSICIAN ASSISTANT

## 2019-12-31 PROCEDURE — 99223 1ST HOSP IP/OBS HIGH 75: CPT | Performed by: STUDENT IN AN ORGANIZED HEALTH CARE EDUCATION/TRAINING PROGRAM

## 2019-12-31 PROCEDURE — 93975 VASCULAR STUDY: CPT | Performed by: SURGERY

## 2019-12-31 PROCEDURE — 84145 PROCALCITONIN (PCT): CPT | Performed by: PHYSICIAN ASSISTANT

## 2019-12-31 RX ORDER — HYDROCHLOROTHIAZIDE 12.5 MG/1
12.5 TABLET ORAL DAILY
Status: DISCONTINUED | OUTPATIENT
Start: 2019-12-31 | End: 2020-01-01 | Stop reason: HOSPADM

## 2019-12-31 RX ORDER — ACETAMINOPHEN 325 MG/1
650 TABLET ORAL EVERY 6 HOURS PRN
Status: DISCONTINUED | OUTPATIENT
Start: 2019-12-31 | End: 2020-01-01 | Stop reason: HOSPADM

## 2019-12-31 RX ORDER — MAGNESIUM SULFATE HEPTAHYDRATE 40 MG/ML
2 INJECTION, SOLUTION INTRAVENOUS ONCE
Status: COMPLETED | OUTPATIENT
Start: 2019-12-31 | End: 2019-12-31

## 2019-12-31 RX ORDER — ONDANSETRON 2 MG/ML
4 INJECTION INTRAMUSCULAR; INTRAVENOUS EVERY 6 HOURS PRN
Status: DISCONTINUED | OUTPATIENT
Start: 2019-12-31 | End: 2020-01-01 | Stop reason: HOSPADM

## 2019-12-31 RX ORDER — SODIUM CHLORIDE 9 MG/ML
75 INJECTION, SOLUTION INTRAVENOUS CONTINUOUS
Status: DISCONTINUED | OUTPATIENT
Start: 2019-12-31 | End: 2019-12-31

## 2019-12-31 RX ORDER — METOCLOPRAMIDE HYDROCHLORIDE 5 MG/ML
10 INJECTION INTRAMUSCULAR; INTRAVENOUS EVERY 6 HOURS PRN
Status: DISCONTINUED | OUTPATIENT
Start: 2019-12-31 | End: 2019-12-31

## 2019-12-31 RX ORDER — CALCIUM CARBONATE/VITAMIN D3 250-3.125
1 TABLET ORAL 2 TIMES DAILY WITH MEALS
Status: DISCONTINUED | OUTPATIENT
Start: 2019-12-31 | End: 2019-12-31 | Stop reason: CLARIF

## 2019-12-31 RX ORDER — METOCLOPRAMIDE HYDROCHLORIDE 5 MG/ML
10 INJECTION INTRAMUSCULAR; INTRAVENOUS EVERY 6 HOURS PRN
Status: DISCONTINUED | OUTPATIENT
Start: 2019-12-31 | End: 2020-01-01 | Stop reason: HOSPADM

## 2019-12-31 RX ORDER — CALCIUM CARBONATE 200(500)MG
1000 TABLET,CHEWABLE ORAL DAILY PRN
Status: DISCONTINUED | OUTPATIENT
Start: 2019-12-31 | End: 2020-01-01 | Stop reason: HOSPADM

## 2019-12-31 RX ORDER — BISACODYL 10 MG
10 SUPPOSITORY, RECTAL RECTAL DAILY PRN
Status: DISCONTINUED | OUTPATIENT
Start: 2019-12-31 | End: 2020-01-01 | Stop reason: HOSPADM

## 2019-12-31 RX ORDER — B-COMPLEX WITH VITAMIN C
1 TABLET ORAL
Status: DISCONTINUED | OUTPATIENT
Start: 2019-12-31 | End: 2020-01-01 | Stop reason: HOSPADM

## 2019-12-31 RX ORDER — HYDRALAZINE HYDROCHLORIDE 20 MG/ML
5 INJECTION INTRAMUSCULAR; INTRAVENOUS EVERY 6 HOURS PRN
Status: DISCONTINUED | OUTPATIENT
Start: 2019-12-31 | End: 2020-01-01 | Stop reason: HOSPADM

## 2019-12-31 RX ORDER — AMLODIPINE BESYLATE 5 MG/1
5 TABLET ORAL DAILY
Status: DISCONTINUED | OUTPATIENT
Start: 2019-12-31 | End: 2020-01-01 | Stop reason: HOSPADM

## 2019-12-31 RX ORDER — PRAVASTATIN SODIUM 20 MG
20 TABLET ORAL DAILY
Status: DISCONTINUED | OUTPATIENT
Start: 2019-12-31 | End: 2020-01-01 | Stop reason: HOSPADM

## 2019-12-31 RX ORDER — METOPROLOL SUCCINATE 100 MG/1
100 TABLET, EXTENDED RELEASE ORAL DAILY
Status: DISCONTINUED | OUTPATIENT
Start: 2019-12-31 | End: 2020-01-01 | Stop reason: HOSPADM

## 2019-12-31 RX ORDER — LISINOPRIL 20 MG/1
40 TABLET ORAL DAILY
Status: DISCONTINUED | OUTPATIENT
Start: 2019-12-31 | End: 2020-01-01 | Stop reason: HOSPADM

## 2019-12-31 RX ADMIN — INSULIN LISPRO 1 UNITS: 100 INJECTION, SOLUTION INTRAVENOUS; SUBCUTANEOUS at 02:48

## 2019-12-31 RX ADMIN — SODIUM CHLORIDE 75 ML/HR: 0.9 INJECTION, SOLUTION INTRAVENOUS at 08:42

## 2019-12-31 RX ADMIN — HYDROCHLOROTHIAZIDE 12.5 MG: 12.5 TABLET ORAL at 08:30

## 2019-12-31 RX ADMIN — ENOXAPARIN SODIUM 40 MG: 40 INJECTION SUBCUTANEOUS at 08:33

## 2019-12-31 RX ADMIN — SODIUM CHLORIDE 75 ML/HR: 0.9 INJECTION, SOLUTION INTRAVENOUS at 03:01

## 2019-12-31 RX ADMIN — PRAVASTATIN SODIUM 20 MG: 20 TABLET ORAL at 18:44

## 2019-12-31 RX ADMIN — AMLODIPINE BESYLATE 5 MG: 5 TABLET ORAL at 08:33

## 2019-12-31 RX ADMIN — HYDRALAZINE HYDROCHLORIDE 5 MG: 20 INJECTION INTRAMUSCULAR; INTRAVENOUS at 02:50

## 2019-12-31 RX ADMIN — METOPROLOL SUCCINATE 100 MG: 100 TABLET, FILM COATED, EXTENDED RELEASE ORAL at 08:33

## 2019-12-31 RX ADMIN — CEFEPIME HYDROCHLORIDE 2000 MG: 2 INJECTION, POWDER, FOR SOLUTION INTRAVENOUS at 09:29

## 2019-12-31 RX ADMIN — INSULIN LISPRO 1 UNITS: 100 INJECTION, SOLUTION INTRAVENOUS; SUBCUTANEOUS at 14:27

## 2019-12-31 RX ADMIN — LISINOPRIL 40 MG: 20 TABLET ORAL at 08:33

## 2019-12-31 RX ADMIN — CEFEPIME HYDROCHLORIDE 2000 MG: 2 INJECTION, POWDER, FOR SOLUTION INTRAVENOUS at 22:31

## 2019-12-31 RX ADMIN — MAGNESIUM SULFATE HEPTAHYDRATE 2 G: 40 INJECTION, SOLUTION INTRAVENOUS at 10:28

## 2019-12-31 NOTE — ED NOTES
Pt ambulated to the bathroom  Also provided with meal at this time        Ralph Echols, RN  12/31/19 7486

## 2019-12-31 NOTE — CONSULTS
Consultation - [facility]  Stephen Minors 81 y o  female MRN: [id number]  Unit/Bed#: ED 32 Encounter: [id number]        Consults    Reason for Consult / Principal Problem: N/V    HPI: Ms Karyna Del Toro is a 82 yo F with a PMH of DM2, HTN, HLD, who presented yesterday due to multiple episodes of nonbloody emesis as well as nausea  She reports in the morning she did not feel well with an upset stomach and nausea and she tried to eat a light lunch and following this she felt more nauseous and started having vomiting  She denies any associated abdominal pain or diarrhea  She had a normal bowel movement yesterday  She denies any melena or hematochezia  She denies any fevers  There is no recent sick contacts or recent travel  She denies any significant NSAID use and denies any chronic GERD or peptic symptoms  She reports this never happened to her before except for when she had perforated appendicitis complicated by abscess s/p IR asiration in 2018 which was managed conservatively with IV antibiotics  She has had a colonoscopy in the past in  which showed moderately severe pandiverticulosis, single polyp in the cecum removed  She had an endoscopy but it was many years ago  This morning she is feeling well without any nausea or further vomiting  She denies any pain  She is hungry and wants to eat      REVIEW OF SYSTEMS: Negative except for as stated above      Historical Information   Past Medical History:   Diagnosis Date    Abnormal TSH 10/16/2017    Diabetes mellitus ([facility] [address] )     GERD (gastroesophageal reflux disease)     Hyperlipidemia     Hypertension     Perforated appendicitis 2018    Sepsis ([facility] [address] ) 2018     Past Surgical History:   Procedure Laterality Date     SECTION      CHOLECYSTECTOMY      NECK SURGERY      OK COLONOSCOPY FLX DX W/COLLJ SPEC WHEN PFRMD N/A 3/1/2018    Procedure: COLONOSCOPY;  Surgeon: Roberta Christianson MD;  Location: [facility]; Service: Gastroenterology     Social History   Social History     Substance and Sexual Activity   Alcohol Use No     Social History     Substance and Sexual Activity   Drug Use No     Social History     Tobacco Use   Smoking Status Never Smoker   Smokeless Tobacco Never Used     Family History   Problem Relation Age of Onset    No Known Problems Mother     No Known Problems Father        Meds/Allergies       (Not in a hospital admission)  Current Facility-Administered Medications   Medication Dose Route Frequency    acetaminophen (TYLENOL) tablet 650 mg  650 mg Oral Q6H PRN    amLODIPine (NORVASC) tablet 5 mg  5 mg Oral Daily    bisacodyl (DULCOLAX) rectal suppository 10 mg  10 mg Rectal Daily PRN    calcium carbonate (TUMS) chewable tablet 1,000 mg  1,000 mg Oral Daily PRN    calcium carbonate-vitamin D (OSCAL-D) 500 mg-200 units per tablet 1 tablet  1 tablet Oral Daily With Breakfast    cefepime (MAXIPIME) 2,000 mg in dextrose 5 % 50 mL IVPB  2,000 mg Intravenous Q12H    enoxaparin (LOVENOX) subcutaneous injection 40 mg  40 mg Subcutaneous Daily    hydrALAZINE (APRESOLINE) injection 5 mg  5 mg Intravenous Q6H PRN    hydrochlorothiazide (HYDRODIURIL) tablet 12 5 mg  12 5 mg Oral Daily    insulin lispro (HumaLOG) 100 units/mL subcutaneous injection 1-6 Units  1-6 Units Subcutaneous Q6H [address]    lisinopril (ZESTRIL) tablet 40 mg  40 mg Oral Daily    magnesium sulfate 2 g/50 mL IVPB (premix) 2 g  2 g Intravenous Once    metoclopramide (REGLAN) injection 10 mg  10 mg Intravenous Q6H PRN    metoprolol succinate (TOPROL-XL) 24 hr tablet 100 mg  100 mg Oral Daily    ondansetron (ZOFRAN) injection 4 mg  4 mg Intravenous Q6H PRN    pravastatin (PRAVACHOL) tablet 20 mg  20 mg Oral Daily    sodium chloride 0 9 % infusion  75 mL/hr Intravenous Continuous       No Known Allergies        Objective     Blood pressure 160/66, pulse 70, temperature 97 5 °F (36 4 °C), temperature source Oral, resp   rate 21, weight 82 kg (180 lb 12 4 oz), SpO2 96 %, not currently breastfeeding  Intake/Output Summary (Last 24 hours) at 12/31/2019 0954  Last data filed at 12/31/2019 2692  Gross per 24 hour   Intake 2476 25 ml   Output --   Net 2476 25 ml         PHYSICAL EXAM:      General Appearance:   Alert, oriented x3, no acute distress   HEENT:   Normocephalic, atraumatic, anicteric      Neck:  Supple, symmetrical, trachea midline   Lungs:   Clear to auscultation bilaterally; no rales, rhonchi or wheezing; respirations unlabored    Heart[de-identified]   S1 and S2 normal; regular rate and rhythm; no murmur, rub, or gallop  Abdomen:   Non-distended, soft, BS active, NTTP   Rectal:   Deferred    Extremities:  No cyanosis, clubbing or edema    Pulses:  2+ and symmetric all extremities    Skin:  No jaundice or pallor, no rashes or lesions      Lab Results:   Results from last 7 days   Lab Units 12/31/19  0619   WBC Thousand/uL 14 17*   HEMOGLOBIN g/dL 12 6   HEMATOCRIT % 38 6   PLATELETS Thousands/uL 328   NEUTROS PCT % 77*   LYMPHS PCT % 15   MONOS PCT % 7   EOS PCT % 0     Results from last 7 days   Lab Units 12/31/19  0619 12/30/19  2037   POTASSIUM mmol/L 3 8 3 6   CHLORIDE mmol/L 103 98*   CO2 mmol/L 28 27   BUN mg/dL 14 15   CREATININE mg/dL 0 74 0 79   CALCIUM mg/dL 8 4 9 7   ALK PHOS U/L  --  69   ALT U/L  --  16   AST U/L  --  15         Results from last 7 days   Lab Units 12/30/19  2037   LIPASE u/L 113       Imaging Studies: I have personally reviewed pertinent imaging studies  Xr Chest 1 View Portable  Result Date: 12/31/2019  Impression: No acute cardiopulmonary disease  Workstation performed: [id number]     Ct Abdomen Pelvis With Contrast  Result Date: 12/30/2019  Impression: 1    Moderate diffuse circumferential wall thickening of the distal duodenum and proximal jejunum and also circumferential wall thickening of multiple mid small bowel loops with mild surrounding fat stranding compatible with nonspecific (infectious, inflammatory, or ischemic) enteritis  No bowel obstruction  No free air  2   Diverticulosis without evidence of diverticulitis  3   Small hiatal hernia  4   Large appendicolith within the appendix  No findings to suggest acute appendicitis  ASSESSMENT and PLAN:      Enteritis  N/V  - Multiple episodes of emesis yesterday without associated pain, fever, or diarrhea  - CT A/P on admission showed moderate diffuse circumferential wall thickening in the distal duodenum/proximal jejunum as well as thickening of multiple mid abdominal small bowel loops, large appendicolith noted without acute appendicitis  - This could be an infectious enteritis v less likely ischemic v less likely PUD based on location  - Check mesenteric doppler to evaluate SMA  - NPO for now for doppler, can trial full liquid/toast/cracker diet   - Supportive care, antiemetics PRN  - No indication for antibiotics from GI standpoint  - Could consider EGD with push enteroscopy to evaluate this area if symptoms persist  - Serial abdominal exams  - Large appendicolith on CT noted, pt reports symptoms are similar to when she had perforated appendicitis in July 2018 which was treated conservatively, no evidence of appendicitis on CT and she has no abdominal pain      The patient will be seen by Dr Paty Avina

## 2019-12-31 NOTE — ED PROVIDER NOTES
Pt Name: Jessica Olmstead  MRN: [id number]  [place] 1935  Age/Sex: 81 y o  female  Date of evaluation: 2019  PCP: Henrietta Mckinley MD    [address] [place] [address]    Chief Complaint   Patient presents with    Vomiting     States having a sick feeling in stomach since this AM  Denies any other symptoms, except short of breath         HPI    81 y o  female presenting with abdominal pain, nausea, vomiting  Patient states the symptoms began this morning, 1 episode of nonbloody emesis as well as feeling intense nausea with starts lower in the stomach and rises towards the throat  She also complains of intermittent dull abdominal pain that seems to be worst when the nausea peaks  She also states that she feels short of breath when the nausea is the worst  She notes a similar episodes past when she had appendicitis that perforated  She denies fever, chest pain, trauma, sick contacts, unusual foods, other symptoms  HPI      Past Medical and Surgical History    Past Medical History:   Diagnosis Date    Abnormal TSH 10/16/2017    Diabetes mellitus ([facility] [address] )     GERD (gastroesophageal reflux disease)     Hyperlipidemia     Hypertension     Perforated appendicitis 2018    Sepsis ([facility] [address] ) 2018       Past Surgical History:   Procedure Laterality Date     SECTION      CHOLECYSTECTOMY      NECK SURGERY      DE COLONOSCOPY FLX DX W/COLLJ SPEC WHEN PFRMD N/A 3/1/2018    Procedure: COLONOSCOPY;  Surgeon: Obdulia Toure MD;  Location: [facility]; Service: Gastroenterology       Family History   Problem Relation Age of Onset    No Known Problems Mother     No Known Problems Father        Social History     Tobacco Use    Smoking status: Never Smoker    Smokeless tobacco: Never Used   Substance Use Topics    Alcohol use: No    Drug use: No           Allergies    No Known Allergies    Home Medications    Prior to Admission medications    Medication Sig Start Date End Date Taking?  Authorizing Provider amLODIPine (NORVASC) 5 mg tablet Take 1 tablet (5 mg total) by mouth daily 12/26/19   Won Colbert MD   Calcium Carbonate-Vitamin D3 (CALCIUM 600/VITAMIN D) 600-400 MG-UNIT TABS Take by mouth 10/2/17   Historical Provider, MD   hydrochlorothiazide (HYDRODIURIL) 12 5 mg tablet Take 1 tablet (12 5 mg total) by mouth daily 12/26/19   Won Colbert MD   lisinopril (ZESTRIL) 40 mg tablet Take 1 tablet (40 mg total) by mouth daily 12/26/19   Won Colbert MD   metFORMIN (GLUCOPHAGE-XR) 500 mg 24 hr tablet Take 1 tablet (500 mg total) by mouth 2 (two) times a day 12/26/19   Won Colbert MD   metoprolol succinate (TOPROL-XL) 100 mg 24 hr tablet Take 1 tablet (100 mg total) by mouth daily 12/26/19   Won Colbert MD   pravastatin (PRAVACHOL) 20 mg tablet Take 1 tablet (20 mg total) by mouth daily 12/26/19   Won Colbert MD           Review of Systems    Review of Systems   Constitutional: Negative for activity change, chills and fever  HENT: Negative for drooling and facial swelling  Eyes: Negative for pain, discharge and visual disturbance  Respiratory: Positive for shortness of breath  Negative for apnea, cough, chest tightness and wheezing  Cardiovascular: Negative for chest pain and leg swelling  Gastrointestinal: Positive for abdominal pain, nausea and vomiting  Negative for constipation and diarrhea  Genitourinary: Negative for difficulty urinating, dysuria and urgency  Musculoskeletal: Negative for arthralgias, back pain and gait problem  Skin: Negative for color change and rash  Neurological: Negative for dizziness, speech difficulty, weakness and headaches  Psychiatric/Behavioral: Negative for agitation, behavioral problems and confusion  All other systems reviewed and negative      Physical Exam      ED Triage Vitals   Temperature Pulse Respirations Blood Pressure SpO2   12/30/19 1921 12/30/19 1837 12/30/19 1837 12/30/19 1837 12/30/19 1837   97 5 °F (36 4 °C) 94 18 (!) 188/86 100 %      Temp Source Heart Rate Source Patient Position - Orthostatic VS BP Location FiO2 (%)   12/30/19 1921 12/30/19 1837 12/30/19 1837 12/30/19 1837 --   Oral Monitor Sitting Left arm       Pain Score       12/30/19 1837       No Pain               Physical Exam   Constitutional: She is oriented to person, place, and time  She appears well-developed and well-nourished  HENT:   Head: Normocephalic and atraumatic  Oropharynx clear and dry   Eyes: Pupils are equal, round, and reactive to light  Conjunctivae and EOM are normal    Neck: Normal range of motion  Neck supple  Cardiovascular: Normal rate, regular rhythm, normal heart sounds and intact distal pulses  Pulmonary/Chest: Effort normal and breath sounds normal  No respiratory distress  She has no wheezes  She has no rales  Abdominal: Soft  She exhibits no distension  There is tenderness  There is no rebound and no guarding  Tenderness to palpation primarily in the right lower quadrant, no rebound or guarding  Musculoskeletal: Normal range of motion  She exhibits no edema or deformity  Neurological: She is alert and oriented to person, place, and time  Skin: Skin is warm and dry  No rash noted  No erythema  Psychiatric: She has a normal mood and affect  Her behavior is normal  Judgment and thought content normal    Nursing note and vitals reviewed  Diagnostic Results  EKG Interpretation    Rate:  73  BPM  Rhythm:  Normal Sinus Rhythm   Axis:  Normal   Intervals: Normal, no blocks, QTc  425 ms  Q waves:  No pathologic Q waves   T waves:  Flattened in lead 1, inverted in aVL  ST segments:  No significant elevations or depressions     Impression:  Normal sinus rhythm without evidence of acute ischemia or significant arrhythmia      EKG for comparison:  EKG dated 1 July 2018 similar in character  EKG interpreted by me         Labs:    Results Reviewed     Procedure Component Value Units Date/Time    Urine Microscopic [[id number]]  (Abnormal) Collected:  12/30/19 2252    Lab Status:  Final result Specimen:  Urine, Clean Catch Updated:  12/30/19 2314     RBC, UA None Seen /hpf      WBC, UA 0-1 /hpf      Epithelial Cells Occasional /hpf      Bacteria, UA None Seen /hpf     UA w Reflex to Microscopic w Reflex to Culture [[id number]]  (Abnormal) Collected:  12/30/19 2252    Lab Status:  Final result Specimen:  Urine, Clean Catch Updated:  12/30/19 2302     Color, UA Yellow     Clarity, UA Clear     Specific Gravity, UA <=1 005     pH, UA 7 0     Leukocytes, UA Negative     Nitrite, UA Negative     Protein, UA Trace mg/dl      Glucose, UA Negative mg/dl      Ketones, UA Negative mg/dl      Urobilinogen, UA 0 2 E U /dl      Bilirubin, UA Negative     Blood, UA Negative    Lactic acid, plasma [[id number]]  (Normal) Collected:  12/30/19 2209    Lab Status:  Final result Specimen:  Blood from Arm, Right Updated:  12/30/19 2300     LACTIC ACID 1 6 mmol/L     Narrative:       Result may be elevated if tourniquet was used during collection  Blood culture #2 [[id number]] Collected:  12/30/19 2232    Lab Status: In process Specimen:  Blood from Hand, Right Updated:  12/30/19 2244    Blood culture #1 [[id number]] Collected:  12/30/19 2219    Lab Status:   In process Specimen:  Blood from Hand, Right Updated:  12/30/19 2222    CBC and differential [[id number]]  (Abnormal) Collected:  12/30/19 2037    Lab Status:  Final result Specimen:  Blood from Arm, Right Updated:  12/30/19 2122     WBC 18 24 Thousand/uL      RBC 5 50 Million/uL      Hemoglobin 14 2 g/dL      Hematocrit 43 0 %      MCV 78 fL      MCH 25 8 pg      MCHC 33 0 g/dL      RDW 15 2 %      MPV 8 5 fL      Platelets 393 Thousands/uL      nRBC 0 /100 WBCs      Neutrophils Relative 90 %      Immat GRANS % 1 %      Lymphocytes Relative 6 %      Monocytes Relative 3 %      Eosinophils Relative 0 %      Basophils Relative 0 %      Neutrophils Absolute 16 45 Thousands/µL      Immature Grans Absolute 0 11 Thousand/uL      Lymphocytes Absolute 1 15 Thousands/µL      Monocytes Absolute 0 49 Thousand/µL      Eosinophils Absolute 0 01 Thousand/µL      Basophils Absolute 0 03 Thousands/µL     Lactic acid, plasma [[id number]]  (Abnormal) Collected:  12/30/19 2037    Lab Status:  Final result Specimen:  Blood from Arm, Right Updated:  12/30/19 2109     LACTIC ACID 2 3 mmol/L     Narrative:       Result may be elevated if tourniquet was used during collection      Troponin I [[id number]]  (Normal) Collected:  12/30/19 2037    Lab Status:  Final result Specimen:  Blood from Arm, Right Updated:  12/30/19 2108     Troponin I <0 02 ng/mL     Lipase [[id number]]  (Normal) Collected:  12/30/19 2037    Lab Status:  Final result Specimen:  Blood from Arm, Right Updated:  12/30/19 2106     Lipase 113 u/L     Comprehensive metabolic panel [[id number]]  (Abnormal) Collected:  12/30/19 2037    Lab Status:  Final result Specimen:  Blood from Arm, Right Updated:  12/30/19 2106     Sodium 137 mmol/L      Potassium 3 6 mmol/L      Chloride 98 mmol/L      CO2 27 mmol/L      ANION GAP 12 mmol/L      BUN 15 mg/dL      Creatinine 0 79 mg/dL      Glucose 170 mg/dL      Calcium 9 7 mg/dL      AST 15 U/L      ALT 16 U/L      Alkaline Phosphatase 69 U/L      Total Protein 8 6 g/dL      Albumin 3 7 g/dL      Total Bilirubin 0 40 mg/dL      eGFR 80 ml/min/1 73sq m     Narrative:       Vanda guidelines for Chronic Kidney Disease (CKD):     Stage 1 with normal or high GFR (GFR > 90 mL/min/1 73 square meters)    Stage 2 Mild CKD (GFR = 60-89 mL/min/1 73 square meters)    Stage 3A Moderate CKD (GFR = 45-59 mL/min/1 73 square meters)    Stage 3B Moderate CKD (GFR = 30-44 mL/min/1 73 square meters)    Stage 4 Severe CKD (GFR = 15-29 mL/min/1 73 square meters)    Stage 5 End Stage CKD (GFR <15 mL/min/1 73 square meters)  Note: GFR calculation is accurate only with a steady state creatinine    Fingerstick Glucose (POCT) [[id number]]  (Abnormal) Collected:  12/30/19 1922    Lab Status:  Final result Updated:  12/30/19 1924     POC Glucose 165 mg/dl           All labs reviewed and utilized in the medical decision making process    Radiology:    CT abdomen pelvis with contrast   Final Result      1  Moderate diffuse circumferential wall thickening of the distal duodenum and proximal jejunum and also circumferential wall thickening of multiple mid small bowel loops with mild surrounding fat stranding compatible with nonspecific (infectious,    inflammatory, or ischemic) enteritis  No bowel obstruction  No free air  2   Diverticulosis without evidence of diverticulitis  3   Small hiatal hernia  4   Large appendicolith within the appendix  No findings to suggest acute appendicitis  Workstation performed: [id number]         XR chest 1 view portable   ED Interpretation   No acute disease          All radiology studies independently viewed by me and interpreted by the radiologist     Procedure    Procedures        ED Course of Care and Re-Assessments      Symptoms improved substantially with IV fluids and Zofran, lactic clear after 1st L  pending CT results, based on elevated lactic acid and neutrophil predominant leukocytosis, started on cefepime concern for intra-abdominal infection           Medications   ondansetron [facility] injection 4 mg (4 mg Intravenous Given 12/30/19 2039)   sodium chloride 0 9 % bolus 1,000 mL (1,000 mL Intravenous New Bag 12/30/19 2037)   iohexol (OMNIPAQUE) 350 MG/ML injection (MULTI-DOSE) 100 mL (100 mL Intravenous Given 12/30/19 2125)   sodium chloride 0 9 % bolus 1,000 mL (1,000 mL Intravenous New Bag 12/30/19 2255)   cefepime (MAXIPIME) 2,000 mg in dextrose 5 % 50 mL IVPB (0 mg Intravenous Stopped 12/30/19 2251)           FINAL IMPRESSION    Final diagnoses:   Nausea & vomiting   Enteritis   Leukocytosis   Dehydration         DISPOSITION/PLAN    Nausea vomiting abdominal as above  Overall presentation felt most consistent with enteritis, presumed bacterial   Elevated lactic acid felt more consistent with dehydration than true severe sepsis based on rapid clearance and overall clinical picture  After initial workup, based on significantly abnormal labs well as severe vomiting, admitted Internal Medicine further care, hemodynamically stable and comfortable at that time  Time reflects when diagnosis was documented in both MDM as applicable and the Disposition within this note     Time User Action Codes Description Comment    12/30/2019 11:11 PM Trenna Richard Add [R11 2] Nausea and vomiting     12/30/2019 11:11 PM Atilio Seats [R11 2] Nausea and vomiting     12/30/2019 11:11 PM Wagoner Backbone T Add [R11 2] Nausea & vomiting     12/30/2019 11:12 PM Trenna Richard Add [K52 9] Enteritis     12/30/2019 11:14 PM Wagoner Backbone T Add [D72 829] Leukocytosis     12/30/2019 11:14 PM Trenna Richard Add [E86 0] Dehydration     12/31/2019 12:20 AM Anita Huerta Modify [K52 9] Enteritis       ED Disposition     ED Disposition Condition Date/Time Comment    Admit Stable Mon Dec 30, 2019 11:11 PM Case was discussed with ANGELICA and the patient's admission status was agreed to be Admission Status: observation status to the service of Dr eRji Lino   Follow-up Information    None           PATIENT REFERRED TO:    No follow-up provider specified  DISCHARGE MEDICATIONS:    Patient's Medications   Discharge Prescriptions    No medications on file       No discharge procedures on file           MD Heber Paz MD  12/31/19 0130

## 2019-12-31 NOTE — ED NOTES
SLIM at bedside      Keith Adrian, [address] [facility]  12/30/19 8738

## 2019-12-31 NOTE — SOCIAL WORK
CM met with the patient to do a general SW assessment:     [facility]  She lives with her daughters, Ana Breen and Isabella Plunkett  She is independent with adls and ambulation  She does not drive, her family tranpsorts  No hx of VNA  No hx of STR  She uses [facility] for rx needs  No POA   She reports her healthcare agent would be her daughters  She has help at home  No D&A use  No MH history  Her PCP is Joby Alex Cm following for any discharge needs

## 2019-12-31 NOTE — PLAN OF CARE
Problem: Potential for Falls  Goal: Patient will remain free of falls  Description  INTERVENTIONS:  - Assess patient frequently for physical needs  -  Identify cognitive and physical deficits and behaviors that affect risk of falls    -  [place] fall precautions as indicated by assessment   - Educate patient/family on patient safety including physical limitations  - Instruct patient to call for assistance with activity based on assessment  - Modify environment to reduce risk of injury  - Consider OT/PT consult to assist with strengthening/mobility  Outcome: Progressing     Problem: PAIN - ADULT  Goal: Verbalizes/displays adequate comfort level or baseline comfort level  Description  Interventions:  - Encourage patient to monitor pain and request assistance  - Assess pain using appropriate pain scale  - Administer analgesics based on type and severity of pain and evaluate response  - Implement non-pharmacological measures as appropriate and evaluate response  - Consider cultural and social influences on pain and pain management  - Notify physician/advanced practitioner if interventions unsuccessful or patient reports new pain  Outcome: Progressing     Problem: INFECTION - ADULT  Goal: Absence or prevention of progression during hospitalization  Description  INTERVENTIONS:  - Assess and monitor for signs and symptoms of infection  - Monitor lab/diagnostic results  - Monitor all insertion sites, i e  indwelling lines, tubes, and drains  - Monitor endotracheal if appropriate and nasal secretions for changes in amount and color  - [place] appropriate cooling/warming therapies per order  - Administer medications as ordered  - Instruct and encourage patient and family to use good hand hygiene technique  - Identify and instruct in appropriate isolation precautions for identified infection/condition  Outcome: Progressing  Goal: Absence of fever/infection during neutropenic period  Description  INTERVENTIONS:  - Monitor WBC    Outcome: Progressing     Problem: SAFETY ADULT  Goal: Maintain or return to baseline ADL function  Description  INTERVENTIONS:  -  Assess patient's ability to carry out ADLs; assess patient's baseline for ADL function and identify physical deficits which impact ability to perform ADLs (bathing, care of mouth/teeth, toileting, grooming, dressing, etc )  - Assess/evaluate cause of self-care deficits   - Assess range of motion  - Assess patient's mobility; develop plan if impaired  - Assess patient's need for assistive devices and provide as appropriate  - Encourage maximum independence but intervene and supervise when necessary  - Involve family in performance of ADLs  - Assess for home care needs following discharge   - Consider OT consult to assist with ADL evaluation and planning for discharge  - Provide patient education as appropriate  Outcome: Progressing  Goal: Maintain or return mobility status to optimal level  Description  INTERVENTIONS:  - Assess patient's baseline mobility status (ambulation, transfers, stairs, etc )    - Identify cognitive and physical deficits and behaviors that affect mobility  - Identify mobility aids required to assist with transfers and/or ambulation (gait belt, sit-to-stand, lift, walker, cane, etc )  - [place] fall precautions as indicated by assessment  - Record patient progress and toleration of activity level on Mobility SBAR; progress patient to next Phase/Stage  - Instruct patient to call for assistance with activity based on assessment  - Consider rehabilitation consult to assist with strengthening/weightbearing, etc   Outcome: Progressing     Problem: DISCHARGE PLANNING  Goal: Discharge to home or other facility with appropriate resources  Description  INTERVENTIONS:  - Identify barriers to discharge w/patient and caregiver  - Arrange for needed discharge resources and transportation as appropriate  - Identify discharge learning needs (meds, wound care, etc )  - Arrange for interpretive services to assist at discharge as needed  - Refer to Case Management Department for coordinating discharge planning if the patient needs post-hospital services based on physician/advanced practitioner order or complex needs related to functional status, cognitive ability, or social support system  Outcome: Progressing     Problem: Knowledge Deficit  Goal: Patient/family/caregiver demonstrates understanding of disease process, treatment plan, medications, and discharge instructions  Description  Complete learning assessment and assess knowledge base    Interventions:  - Provide teaching at level of understanding  - Provide teaching via preferred learning methods  Outcome: Progressing

## 2019-12-31 NOTE — SEPSIS NOTE
Sepsis Note   Jessica Olmstead 81 y o  female MRN: [id number]  Unit/Bed#: ED 26 Encounter: [id number]  Despite initial SIRS criteria and slightly elevated lactic acid, these abnormalities secondary to dehydration from repeated emesis rather than true severe sepsis lactic acidosis cleared rapidly with fluid  qSOFA     Row Name 12/30/19 2208 12/30/19 1921 12/30/19 1837          Altered mental status GCS < 15  --  --  --      Respiratory Rate > / =22  0  0  0      Systolic BP < / =095  0  0  0      Q Sofa Score  0  0  0          Initial Sepsis Screening     Row Name 12/30/19 2109                Is the patient's history suggestive of a new or worsening infection? (!) Yes (Proceed)  -COLIN        Suspected source of infection  acute abdominal infection  -COLIN        Are two or more of the following signs & symptoms of infection both present and new to the patient? (!) Yes (Proceed)  -COLIN        Indicate SIRS criteria  Tachycardia > 90 bpm;Leukocytosis (WBC > 39275 IJL)  -COLIN        If the answer is yes to both questions, suspicion of sepsis is present  --        If severe sepsis is present AND tissue hypoperfusion perists in the hour after fluid resuscitation or lactate > 4, the patient meets criteria for SEPTIC SHOCK  --        Are any of the following organ dysfunction criteria present within 6 hours of suspected infection and SIRS criteria that are NOT considered to be chronic conditions?   (!) Yes  -COLIN        Organ dysfunction  Lactate > 2 0 mmol/L  -COLIN        Date of presentation of severe sepsis  12/30/19  -COLIN        Time of presentation of severe sepsis  2109  -COLIN        Tissue hypoperfusion persists in the hour after crystalloid fluid administration, evidenced, by either:  --        Was hypotension present within one hour of the conclusion of crystalloid fluid administration?  --        Date of presentation of septic shock  --        Time of presentation of septic shock  --          User Key  (r) = Recorded By, (t) = Taken By, (c) = Cosigned By    [address] Name Provider Adali Zuniga MD Physician

## 2019-12-31 NOTE — ASSESSMENT & PLAN NOTE
· Reports acute onset N/V today  States has vomited 5-6 times  Denies abdominal pain  Denies diarrhea  Denies sick contacts  · CT abd/pelvis w/ completed in ED revealed " Moderate diffuse circumferential wall thickening of the distal duodenum and proximal jejunum and also circumferential wall thickening of multiple mid small bowel loops with mild surrounding fat stranding compatible with nonspecific (infectious, inflammatory, or ischemic) enteritis  No bowel obstruction  No free air  · WBC 18 24  LA initially 2 3, down to 1 6 after 2L NS bolus  · Procalcitonin in am  · Received initial dose of Cefepime in ED   Will continue  · GI consult  · NPO  · PRN Zofran/Reglan for N/V  · NS @ 75mL/hr x 12 hours

## 2019-12-31 NOTE — H&P
H&P- Bronwyn Sang 1935, 81 y o  female MRN: [id number]    Unit/Bed#: ED 26 Encounter: [id number]    Primary Care Provider: Won Colbert MD   Date and time admitted to hospital: 12/30/2019  7:58 PM        * Enteritis  Assessment & Plan  · Reports acute onset N/V today  States has vomited 5-6 times  Denies abdominal pain  Denies diarrhea  Denies sick contacts  · CT abd/pelvis w/ completed in ED revealed " Moderate diffuse circumferential wall thickening of the distal duodenum and proximal jejunum and also circumferential wall thickening of multiple mid small bowel loops with mild surrounding fat stranding compatible with nonspecific (infectious, inflammatory, or ischemic) enteritis  No bowel obstruction  No free air  · WBC 18 24  LA initially 2 3, down to 1 6 after 2L NS bolus  · Procalcitonin in am  · Received initial dose of Cefepime in ED   Will continue  · GI consult  · NPO  · PRN Zofran/Reglan for N/V  · NS @ 75mL/hr x 12 hours    Non-intractable vomiting with nausea  Assessment & Plan  · Likely 2/2 # 1  · See AP above    Leukocytosis  Assessment & Plan  · WBC on admission 18 24  · Likely 2/2 # 1  · Procalcitonin in am  · Continue antibiotics as above    Lactic acidosis  Assessment & Plan  · Lactic acid initially 2 3, down to 1 6 after 2L NS bolus in ED  · Likely 2/2 # 1    Essential hypertension  Assessment & Plan  · Hypertensive in /90  · Continue home dose HCTZ, Lisinopril and Toprol XL  · Monitor BP per unit protocol  · Hydralazine IV PRN SBP > 180    Type 2 diabetes mellitus without complication, without long-term current use of insulin [facility])  Assessment & Plan  Lab Results   Component Value Date    HGBA1C 6 5 (H) 12/23/2019       Recent Labs     12/30/19  1922 12/31/19  0225   POCGLU 165* 155*       Blood Sugar Average: Last 72 hrs:  (P) 160     · Hold home dose Glucophage while inpt  · FSBS q6hrs w/ SSI while NPO    Mixed hyperlipidemia  Assessment & Plan  · Continue home dose Pravachol      VTE Prophylaxis: Enoxaparin (Lovenox)  / sequential compression device   Code Status: Level 1 Full Code  POLST: POLST form is not discussed and not completed at this time  Discussion with family: NA    Anticipated Length of Stay:  Patient will be admitted on an Observation basis with an anticipated length of stay of  Less than 2 midnights  Justification for Hospital Stay: See AP above    Total Time for Visit, including Counseling / Coordination of Care: 45 minutes  Greater than 50% of this total time spent on direct patient counseling and coordination of care  Chief Complaint:   Acute onset N/V on day of admission    History of Present Illness:    eBrt Carter is a 81 y o  female who presents with acute onset N/V x 5-6 on day of admission  Denies abdominal pain  Denies sick contacts  Denies eating anything other than what family ate yesterday and today  CT abdomen in ED revealed enteritis  Denies diarrhea  Denies fever/chills  Review of Systems:    Review of Systems   Constitutional: Negative for activity change, chills and fever  HENT: Negative for congestion, ear pain, sinus pressure and sore throat  Eyes: Negative for visual disturbance  Respiratory: Negative for cough, shortness of breath and wheezing  Cardiovascular: Negative for chest pain, palpitations and leg swelling  Gastrointestinal: Positive for nausea and vomiting  Negative for abdominal pain, constipation and diarrhea  Genitourinary: Negative for difficulty urinating, dysuria, frequency and urgency  Musculoskeletal: Negative for neck pain and neck stiffness  Neurological: Negative for dizziness, syncope and headaches  All other systems reviewed and are negative        Past Medical and Surgical History:     Past Medical History:   Diagnosis Date    Abnormal TSH 10/16/2017    [facility] )     GERD (gastroesophageal reflux disease)     Hyperlipidemia     Hypertension     Perforated appendicitis 2018    Sepsis ([address] ) 2018       Past Surgical History:   Procedure Laterality Date     SECTION      CHOLECYSTECTOMY      NECK SURGERY      NM COLONOSCOPY FLX DX W/COLLJ SPEC WHEN PFRMD N/A 3/1/2018    Procedure: COLONOSCOPY;  Surgeon: Diane Torres MD;  Location: [facility]; Service: Gastroenterology       Meds/Allergies:    Prior to Admission medications    Medication Sig Start Date End Date Taking? Authorizing Provider   amLODIPine (NORVASC) 5 mg tablet Take 1 tablet (5 mg total) by mouth daily 19   Talya Whitfield MD   Calcium Carbonate-Vitamin D3 (CALCIUM 600/VITAMIN D) 600-400 MG-UNIT TABS Take by mouth 10/2/17   Historical Provider, MD   hydrochlorothiazide (HYDRODIURIL) 12 5 mg tablet Take 1 tablet (12 5 mg total) by mouth daily 19   Talya Whitfield MD   lisinopril (ZESTRIL) 40 mg tablet Take 1 tablet (40 mg total) by mouth daily 19   Talya Whitfield MD   metFORMIN (GLUCOPHAGE-XR) 500 mg 24 hr tablet Take 1 tablet (500 mg total) by mouth 2 (two) times a day 19   Talya Whitfield MD   metoprolol succinate (TOPROL-XL) 100 mg 24 hr tablet Take 1 tablet (100 mg total) by mouth daily 19   Talya Whitfield MD   pravastatin (PRAVACHOL) 20 mg tablet Take 1 tablet (20 mg total) by mouth daily 19   Talya Whitfield MD     I have reviewed home medications with patient personally  Allergies: No Known Allergies    Social History:     Marital Status:    Patient Pre-hospital Living Situation: Lives with daughter  Patient Pre-hospital Level of Mobility: Ambulatory   Uses walker if out shopping  Patient Pre-hospital Diet Restrictions: None  Substance Use History:   Social History     Substance and Sexual Activity   Alcohol Use No     Social History     Tobacco Use   Smoking Status Never Smoker   Smokeless Tobacco Never Used     Social History     Substance and Sexual Activity   Drug Use No       Family History:    Family History   Problem Relation Age of Onset    No Known Problems Mother     No Known Problems Father        Physical Exam:     Vitals:   Blood Pressure: (!) 191/90 (12/30/19 2208)  Pulse: 78 (12/30/19 2208)  Temperature: 97 5 °F (36 4 °C) (12/30/19 1921)  Temp Source: Oral (12/30/19 1921)  Respirations: 18 (12/30/19 2208)  Weight - Scale: 82 kg (180 lb 12 4 oz) (12/31/19 0238)  SpO2: 96 % (12/30/19 2208)    Physical Exam   Constitutional: She is oriented to person, place, and time  She appears well-developed and well-nourished  No distress  HENT:   Head: Normocephalic and atraumatic  Eyes: Pupils are equal, round, and reactive to light  EOM are normal    Neck: Normal range of motion  Neck supple  Cardiovascular: Normal rate, regular rhythm, normal heart sounds and intact distal pulses  Exam reveals no gallop and no friction rub  No murmur heard  Pulmonary/Chest: Effort normal and breath sounds normal  No respiratory distress  She has no wheezes  She has no rales  Abdominal: Soft  Bowel sounds are normal  There is no tenderness  There is no rebound and no guarding  Musculoskeletal: Normal range of motion  She exhibits no edema or tenderness  Neurological: She is alert and oriented to person, place, and time  Skin: Skin is warm and dry  Psychiatric: She has a normal mood and affect  Her behavior is normal  Thought content normal          Additional Data:     Lab Results: I have personally reviewed pertinent reports        Results from last 7 days   Lab Units 12/30/19 2037   WBC Thousand/uL 18 24*   HEMOGLOBIN g/dL 14 2   HEMATOCRIT % 43 0   PLATELETS Thousands/uL 352   NEUTROS PCT % 90*   LYMPHS PCT % 6*   MONOS PCT % 3*   EOS PCT % 0     Results from last 7 days   Lab Units 12/30/19 2037   SODIUM mmol/L 137   POTASSIUM mmol/L 3 6   CHLORIDE mmol/L 98*   CO2 mmol/L 27   BUN mg/dL 15   CREATININE mg/dL 0 79   ANION GAP mmol/L 12   CALCIUM mg/dL 9 7   ALBUMIN g/dL 3 7   TOTAL BILIRUBIN mg/dL 0 40   ALK PHOS U/L 69   ALT U/L 16   AST U/L 15   GLUCOSE RANDOM mg/dL 170*         Results from last 7 days   Lab Units 12/31/19  0225 12/30/19  1922   POC GLUCOSE mg/dl 155* 165*         Results from last 7 days   Lab Units 12/30/19  2209 12/30/19  2037   LACTIC ACID mmol/L 1 6 2 3*       Imaging: I have personally reviewed pertinent reports  and I have personally reviewed pertinent films in PACS    CT abdomen pelvis with contrast   Final Result by Arash Medina MD (12/30 2218)      1  Moderate diffuse circumferential wall thickening of the distal duodenum and proximal jejunum and also circumferential wall thickening of multiple mid small bowel loops with mild surrounding fat stranding compatible with nonspecific (infectious,    inflammatory, or ischemic) enteritis  No bowel obstruction  No free air  2   Diverticulosis without evidence of diverticulitis  3   Small hiatal hernia  4   Large appendicolith within the appendix  No findings to suggest acute appendicitis  Workstation performed: [id number]         XR chest 1 view portable   ED Interpretation by Heber Rizo MD (12/30 2114)   No acute disease          EKG, Pathology, and Other Studies Reviewed on Admission:   · EKG: NA    Allscripts / Epic Records Reviewed: Yes     ** Please Note: This note has been constructed using a voice recognition system   **

## 2019-12-31 NOTE — ASSESSMENT & PLAN NOTE
Lab Results   Component Value Date    HGBA1C 6 5 (H) 12/23/2019       Recent Labs     12/30/19 1922 12/31/19  0225   POCGLU 165* 155*       Blood Sugar Average: Last 72 hrs:  (P) 160     · Hold home dose Glucophage while inpt  · FSBS q6hrs w/ SSI while NPO

## 2019-12-31 NOTE — ED NOTES
1   CC- Pt to ED with vomiting and abdominal pain     2  Orientation Status- A+O x 4     3  Abnormal labs/Abnormal focus Assessment/abnormal vitals- elevated lactic and wbc count  4  Medications- given humalog insulin 1 unit- magnesium/ cefepime  5  Last time narcotics were given/pain meds  None given    6/ IV Lines/Drains/ etc 20RAC    7  Isolation status None    8  Skin- wnl    9  Ambulation Status- assist x 1     10   ED phone number [phone], RN  12/31/19 6344

## 2019-12-31 NOTE — UTILIZATION REVIEW
Initial Clinical Review    Admission: Date/Time/Statement: OBS   12/30  2314 converted to IP on 12/31 @ 1410    Admitting Physician Karla Alfonso    Level of Care Med Surg    Estimated length of stay More than 2 Midnights    Certification I certify that inpatient services are medically necessary for this patient for a duration of greater than two midnights  See H&P and MD Progress Notes for additional information about the patient's course of treatment  ED Arrival Information     Expected Arrival Acuity Means of Arrival Escorted By Service Admission Type    - 12/30/2019 18:30 Urgent Walk-In Family Member General Medicine Urgent    Arrival Complaint    vomiting        Chief Complaint   Patient presents with    Vomiting     States having a sick feeling in stomach since this AM  Denies any other symptoms, except short of breath     Assessment/Plan: 81 y o  female who presents with acute onset N/V x 5-6 on day of admission  Denies abdominal pain  Denies sick contacts  Denies eating anything other than what family ate yesterday and today  CT abdomen in ED revealed enteritis  Denies diarrhea  Denies fever/chills  Enteritis  Assessment & Plan  · Reports acute onset N/V today  States has vomited 5-6 times  Denies abdominal pain  Denies diarrhea  Denies sick contacts  · CT abd/pelvis w/ completed in ED revealed " Moderate diffuse circumferential wall thickening of the distal duodenum and proximal jejunum and also circumferential wall thickening of multiple mid small bowel loops with mild surrounding fat stranding compatible with nonspecific (infectious, inflammatory, or ischemic) enteritis   No bowel obstruction   No free air  · WBC 18 24  LA initially 2 3, down to 1 6 after 2L NS bolus  · Procalcitonin in am  · Received initial dose of Cefepime in ED   Will continue  · GI consult  · NPO  · PRN Zofran/Reglan for N/V  · NS @ 75mL/hr x 12 hours     Non-intractable vomiting with nausea  Assessment & Plan  · Likely 2/2 # 1  · See AP above     Leukocytosis  Assessment & Plan  · WBC on admission 18 24  · Likely 2/2 # 1  · Procalcitonin in am  · Continue antibiotics as above     Lactic acidosis  Assessment & Plan  · Lactic acid initially 2 3, down to 1 6 after 2L NS bolus in ED  · Likely 2/2 # 1     Essential hypertension  Assessment & Plan  · Hypertensive in /90  · Continue home dose HCTZ, Lisinopril and Toprol XL  · Monitor BP per unit protocol  · Hydralazine IV PRN SBP > 180     Type 2 diabetes mellitus without complication, without long-term current use of insulin (HCC)  Assessment & Plan        Lab Results   Component Value Date     HGBA1C 6 5 (H) 12/23/2019              Recent Labs     12/30/19  1922 12/31/19  0225   POCGLU 165* 155*         Blood Sugar Average: Last 72 hrs:  (P) 160      · Hold home dose Glucophage while inpt  · FSBS q6hrs w/ SSI while NPO     Mixed hyperlipidemia  Assessment & Plan  · Continue home dose Pravachol        VTE Prophylaxis: Enoxaparin (Lovenox)  / sequential compression device   Code Status: Level 1 Full Code  POLST: POLST form is not discussed and not completed at this time  Discussion with family: NA     Anticipated Length of Stay:  Patient will be admitted on an Observation basis with an anticipated length of stay of  Less than 2 midnights     Justification for Hospital Stay: See AP above       ED Triage Vitals   Temperature Pulse Respirations Blood Pressure SpO2   12/30/19 1921 12/30/19 1837 12/30/19 1837 12/30/19 1837 12/30/19 1837   97 5 °F (36 4 °C) 94 18 (!) 188/86 100 %      Temp Source Heart Rate Source Patient Position - Orthostatic VS BP Location FiO2 (%)   12/30/19 1921 12/30/19 1837 12/30/19 1837 12/30/19 1837 --   Oral Monitor Sitting Left arm       Pain Score       12/30/19 1837       No Pain        Wt Readings from Last 1 Encounters:   12/31/19 82 kg (180 lb 12 4 oz)     Additional Vital Signs:   12/31/19 0600  --  --  18  174/72Abnormal   96 %  None (Room air)  Lying   12/31/19 0400  --  67  19  153/88  96 %  None (Room air)  --   12/31/19 0314  --  77  18  180/61Abnormal   95 %  None (Room air)  Lying   12/31/19 0242  --  65  18  195/86Abnormal   96 %  None (Room air)  Lying   12/31/19 0239  --  67  18  --  --  --  --   12/30/19 2208  --  78  18  191/90Abnormal   96 %  None (Room air)  --   12/30/19 1921  97 5 °F (36 4 °C)  79  21  179/85Abnormal   99 %  None (Room air)  Sitting       Pertinent Labs/Diagnostic Test Results:   12/30 CXR - pending   12/30 CT abd -    1  Moderate diffuse circumferential wall thickening of the distal duodenum and proximal jejunum and also circumferential wall thickening of multiple mid small bowel loops with mild surrounding fat stranding compatible with nonspecific (infectious,   inflammatory, or ischemic) enteritis  No bowel obstruction  No free air  2   Diverticulosis without evidence of diverticulitis  3   Small hiatal hernia  4   Large appendicolith within the appendix    No findings to suggest acute appendicitis      Results from last 7 days   Lab Units 12/31/19  0619 12/30/19 2037   WBC Thousand/uL 14 17* 18 24*   HEMOGLOBIN g/dL 12 6 14 2   HEMATOCRIT % 38 6 43 0   PLATELETS Thousands/uL 328 352   NEUTROS ABS Thousands/µL 10 93* 16 45*     Results from last 7 days   Lab Units 12/31/19  0619 12/30/19 2037   SODIUM mmol/L 139 137   POTASSIUM mmol/L 3 8 3 6   CHLORIDE mmol/L 103 98*   CO2 mmol/L 28 27   ANION GAP mmol/L 8 12   BUN mg/dL 14 15   CREATININE mg/dL 0 74 0 79   EGFR ml/min/1 73sq m 87 80   CALCIUM mg/dL 8 4 9 7   MAGNESIUM mg/dL 1 6  --      Results from last 7 days   Lab Units 12/30/19 2037   AST U/L 15   ALT U/L 16   ALK PHOS U/L 69   TOTAL PROTEIN g/dL 8 6*   ALBUMIN g/dL 3 7   TOTAL BILIRUBIN mg/dL 0 40     Results from last 7 days   Lab Units 12/31/19  0604 12/31/19 0225 12/30/19  1922   POC GLUCOSE mg/dl 112 155* 165*     Results from last 7 days   Lab Units 12/31/19  0619 12/30/19 2037   GLUCOSE RANDOM mg/dL 108 170*     Results from last 7 days   Lab Units 12/30/19 2037   TROPONIN I ng/mL <0 02     Results from last 7 days   Lab Units 12/30/19 2209 12/30/19 2037   LACTIC ACID mmol/L 1 6 2 3*     Results from last 7 days   Lab Units 12/30/19 2037   LIPASE u/L 113             Results from last 7 days   Lab Units 12/30/19  2252   CLARITY UA  Clear   COLOR UA  Yellow   SPEC GRAV UA  <=1 005   PH UA  7 0   GLUCOSE UA mg/dl Negative   KETONES UA mg/dl Negative   BLOOD UA  Negative   PROTEIN UA mg/dl Trace*   NITRITE UA  Negative   BILIRUBIN UA  Negative   UROBILINOGEN UA E U /dl 0 2   LEUKOCYTES UA  Negative   WBC UA /hpf 0-1*   RBC UA /hpf None Seen   BACTERIA UA /hpf None Seen   EPITHELIAL CELLS WET PREP /hpf Occasional   ED Treatment:   Medication Administration from 12/30/2019 1830 to 12/31/2019 1177       Date/Time Order Dose Route Action Action by Comments     12/30/2019 2039 ondansetron (ZOFRAN) injection 4 mg 4 mg Intravenous Given Oleksandr Goyal RN      12/31/2019 0239 sodium chloride 0 9 % bolus 1,000 mL 0 mL Intravenous Stopped Anne Manjarrez RN      12/30/2019 2037 sodium chloride 0 9 % bolus 1,000 mL 1,000 mL Intravenous [address] Oleksandr Goyal RN      12/30/2019 2125 iohexol (OMNIPAQUE) 350 MG/ML injection (MULTI-DOSE) 100 mL 100 mL Intravenous Given Enid Blackwood      12/31/2019 0239 sodium chloride 0 9 % bolus 1,000 mL 0 mL Intravenous Stopped Anne Manjarrez RN      12/30/2019 2255 sodium chloride 0 9 % bolus 1,000 mL 1,000 mL Intravenous [address] Oleksandr Goyal RN      12/30/2019 2251 cefepime (MAXIPIME) 2,000 mg in dextrose 5 % 50 mL IVPB 0 mg Intravenous Stopped Oleksandr Goyal RN      12/30/2019 2209 cefepime (MAXIPIME) 2,000 mg in dextrose 5 % 50 mL IVPB 2,000 mg Intravenous New Bag Oleksandr Goyal RN      12/31/2019 0301 sodium chloride 0 9 % infusion 75 mL/hr Intravenous [address] [facility] [address]      12/31/2019 0050 insulin lispro (HumaLOG) 100 units/mL subcutaneous injection 1-6 Units 1 Units Subcutaneous Not Given Padmaja Christopher RN      12/31/2019 0248 insulin lispro (HumaLOG) 100 units/mL subcutaneous injection 1-6 Units 1 Units Subcutaneous Given Padmaja Christopher RN      12/31/2019 0250 hydrALAZINE (APRESOLINE) injection 5 mg 5 mg Intravenous Given Padmaja Christopher RN         Past Medical History:   Diagnosis Date    Abnormal TSH 10/16/2017    Diabetes mellitus ([facility] )     GERD (gastroesophageal reflux disease)     Hyperlipidemia     Hypertension     Perforated appendicitis 7/1/2018    Sepsis ([facility] ) 7/6/2018     Present on Admission:   Non-intractable vomiting with nausea   Enteritis   Type 2 diabetes mellitus without complication, without long-term current use of insulin ([facility]   Mixed hyperlipidemia   Leukocytosis   Essential hypertension   Lactic acidosis      Admitting Diagnosis: Vomiting [R11 10]  Age/Sex: 81 y o  female  Admission Orders:  Scheduled Medications:    Medications:  amLODIPine 5 mg Oral Daily   calcium carbonate-vitamin D 1 tablet Oral Daily With Breakfast   cefepime 2,000 mg Intravenous Q12H   enoxaparin 40 mg Subcutaneous Daily   hydrochlorothiazide 12 5 mg Oral Daily   insulin lispro 1-6 Units Subcutaneous Q6H [facility]   lisinopril 40 mg Oral Daily   metoprolol succinate 100 mg Oral Daily   pravastatin 20 mg Oral Daily     Continuous IV Infusions:    sodium chloride 75 mL/hr Intravenous Continuous     PRN Meds:    acetaminophen 650 mg Oral Q6H PRN   bisacodyl 10 mg Rectal Daily PRN   calcium carbonate 1,000 mg Oral Daily PRN   hydrALAZINE 5 mg Intravenous Q6H PRN x1   metoclopramide 10 mg Intravenous Q6H PRN   ondansetron 4 mg Intravenous Q6H PRN     Fingerstick q6hr  NPO  IP CONSULT TO GASTROENTEROLOGY    Network Utilization Review Department  [email or web address]  ATTENTION: Please call with any questions or concerns to [phone] and carefully listen to the prompts so that you are directed to the right person  All voicemails are confidential   Fin[employer] Feeling all requests for admission clinical reviews, approved or denied determinations and any other requests to dedicated fax number below belonging to the campus where the patient is receiving treatment   List of dedicated fax numbers for the Facilities:  [address] [place] [address] DENIALS (Administrative/Medical Necessity) [phone]   [address] (Maternity/NICU/Pediatrics) [phone]   Gerber Mcfarland [phone]   [facility] [phone]   [facility] [phone]   [facility] [phone]   [address] [place] [address] [phone]   [facility]  [phone]   [address] [facility] [address]  [address] [facility] [address] [facility] [phone]

## 2019-12-31 NOTE — ASSESSMENT & PLAN NOTE
· Hypertensive in /90  · Continue home dose HCTZ, Lisinopril and Toprol XL  · Monitor BP per unit protocol  · Hydralazine IV PRN SBP > 180

## 2020-01-01 VITALS
TEMPERATURE: 98 F | RESPIRATION RATE: 18 BRPM | BODY MASS INDEX: 33.88 KG/M2 | OXYGEN SATURATION: 98 % | HEIGHT: 62 IN | SYSTOLIC BLOOD PRESSURE: 150 MMHG | HEART RATE: 75 BPM | DIASTOLIC BLOOD PRESSURE: 72 MMHG | WEIGHT: 184.08 LBS

## 2020-01-01 PROBLEM — R11.2 NON-INTRACTABLE VOMITING WITH NAUSEA: Status: RESOLVED | Noted: 2019-12-31 | Resolved: 2020-01-01

## 2020-01-01 PROBLEM — K52.9 ENTERITIS: Status: RESOLVED | Noted: 2019-12-31 | Resolved: 2020-01-01

## 2020-01-01 PROBLEM — E87.2 LACTIC ACIDOSIS: Status: RESOLVED | Noted: 2019-12-31 | Resolved: 2020-01-01

## 2020-01-01 PROBLEM — E87.20 LACTIC ACIDOSIS: Status: RESOLVED | Noted: 2019-12-31 | Resolved: 2020-01-01

## 2020-01-01 PROBLEM — D72.829 LEUKOCYTOSIS: Status: RESOLVED | Noted: 2017-10-16 | Resolved: 2020-01-01

## 2020-01-01 LAB
ALBUMIN SERPL BCP-MCNC: 2.9 G/DL (ref 3.5–5)
ANION GAP SERPL CALCULATED.3IONS-SCNC: 9 MMOL/L (ref 4–13)
BASOPHILS # BLD AUTO: 0.01 THOUSANDS/ΜL (ref 0–0.1)
BASOPHILS NFR BLD AUTO: 0 % (ref 0–1)
BUN SERPL-MCNC: 11 MG/DL (ref 5–25)
CALCIUM SERPL-MCNC: 8.6 MG/DL (ref 8.3–10.1)
CHLORIDE SERPL-SCNC: 105 MMOL/L (ref 100–108)
CO2 SERPL-SCNC: 26 MMOL/L (ref 21–32)
CREAT SERPL-MCNC: 0.76 MG/DL (ref 0.6–1.3)
EOSINOPHIL # BLD AUTO: 0.2 THOUSAND/ΜL (ref 0–0.61)
EOSINOPHIL NFR BLD AUTO: 2 % (ref 0–6)
ERYTHROCYTE [DISTWIDTH] IN BLOOD BY AUTOMATED COUNT: 15.6 % (ref 11.6–15.1)
GFR SERPL CREATININE-BSD FRML MDRD: 84 ML/MIN/1.73SQ M
GLUCOSE SERPL-MCNC: 102 MG/DL (ref 65–140)
GLUCOSE SERPL-MCNC: 156 MG/DL (ref 65–140)
GLUCOSE SERPL-MCNC: 95 MG/DL (ref 65–140)
GLUCOSE SERPL-MCNC: 98 MG/DL (ref 65–140)
HCT VFR BLD AUTO: 34.3 % (ref 34.8–46.1)
HGB BLD-MCNC: 11 G/DL (ref 11.5–15.4)
IMM GRANULOCYTES # BLD AUTO: 0.05 THOUSAND/UL (ref 0–0.2)
IMM GRANULOCYTES NFR BLD AUTO: 1 % (ref 0–2)
LYMPHOCYTES # BLD AUTO: 2.77 THOUSANDS/ΜL (ref 0.6–4.47)
LYMPHOCYTES NFR BLD AUTO: 26 % (ref 14–44)
MAGNESIUM SERPL-MCNC: 2 MG/DL (ref 1.6–2.6)
MCH RBC QN AUTO: 25.8 PG (ref 26.8–34.3)
MCHC RBC AUTO-ENTMCNC: 32.1 G/DL (ref 31.4–37.4)
MCV RBC AUTO: 81 FL (ref 82–98)
MONOCYTES # BLD AUTO: 0.79 THOUSAND/ΜL (ref 0.17–1.22)
MONOCYTES NFR BLD AUTO: 8 % (ref 4–12)
NEUTROPHILS # BLD AUTO: 6.76 THOUSANDS/ΜL (ref 1.85–7.62)
NEUTS SEG NFR BLD AUTO: 63 % (ref 43–75)
NRBC BLD AUTO-RTO: 0 /100 WBCS
PHOSPHATE SERPL-MCNC: 2.6 MG/DL (ref 2.3–4.1)
PLATELET # BLD AUTO: 303 THOUSANDS/UL (ref 149–390)
PMV BLD AUTO: 8.7 FL (ref 8.9–12.7)
POTASSIUM SERPL-SCNC: 3.1 MMOL/L (ref 3.5–5.3)
RBC # BLD AUTO: 4.26 MILLION/UL (ref 3.81–5.12)
SODIUM SERPL-SCNC: 140 MMOL/L (ref 136–145)
WBC # BLD AUTO: 10.58 THOUSAND/UL (ref 4.31–10.16)

## 2020-01-01 PROCEDURE — 85025 COMPLETE CBC W/AUTO DIFF WBC: CPT | Performed by: STUDENT IN AN ORGANIZED HEALTH CARE EDUCATION/TRAINING PROGRAM

## 2020-01-01 PROCEDURE — 83735 ASSAY OF MAGNESIUM: CPT | Performed by: STUDENT IN AN ORGANIZED HEALTH CARE EDUCATION/TRAINING PROGRAM

## 2020-01-01 PROCEDURE — 82948 REAGENT STRIP/BLOOD GLUCOSE: CPT

## 2020-01-01 PROCEDURE — 99239 HOSP IP/OBS DSCHRG MGMT >30: CPT | Performed by: STUDENT IN AN ORGANIZED HEALTH CARE EDUCATION/TRAINING PROGRAM

## 2020-01-01 PROCEDURE — 80069 RENAL FUNCTION PANEL: CPT | Performed by: STUDENT IN AN ORGANIZED HEALTH CARE EDUCATION/TRAINING PROGRAM

## 2020-01-01 RX ORDER — POTASSIUM CHLORIDE 20 MEQ/1
40 TABLET, EXTENDED RELEASE ORAL ONCE
Status: COMPLETED | OUTPATIENT
Start: 2020-01-01 | End: 2020-01-01

## 2020-01-01 RX ORDER — ONDANSETRON 4 MG/1
4 TABLET, FILM COATED ORAL EVERY 8 HOURS PRN
Qty: 15 TABLET | Refills: 0 | Status: SHIPPED | OUTPATIENT
Start: 2020-01-01 | End: 2020-01-14 | Stop reason: CLARIF

## 2020-01-01 RX ORDER — POTASSIUM CHLORIDE 750 MG/1
10 TABLET, EXTENDED RELEASE ORAL DAILY
Qty: 3 TABLET | Refills: 0 | Status: SHIPPED | OUTPATIENT
Start: 2020-01-01 | End: 2020-01-14 | Stop reason: CLARIF

## 2020-01-01 RX ADMIN — AMLODIPINE BESYLATE 5 MG: 5 TABLET ORAL at 09:44

## 2020-01-01 RX ADMIN — POTASSIUM CHLORIDE 40 MEQ: 1500 TABLET, EXTENDED RELEASE ORAL at 11:11

## 2020-01-01 RX ADMIN — METOPROLOL SUCCINATE 100 MG: 100 TABLET, FILM COATED, EXTENDED RELEASE ORAL at 09:44

## 2020-01-01 RX ADMIN — LISINOPRIL 40 MG: 20 TABLET ORAL at 09:57

## 2020-01-01 RX ADMIN — OYSTER SHELL CALCIUM WITH VITAMIN D 1 TABLET: 500; 200 TABLET, FILM COATED ORAL at 07:14

## 2020-01-01 RX ADMIN — ENOXAPARIN SODIUM 40 MG: 40 INJECTION SUBCUTANEOUS at 09:44

## 2020-01-01 RX ADMIN — HYDROCHLOROTHIAZIDE 12.5 MG: 12.5 TABLET ORAL at 09:44

## 2020-01-01 RX ADMIN — CEFEPIME HYDROCHLORIDE 2000 MG: 2 INJECTION, POWDER, FOR SOLUTION INTRAVENOUS at 09:44

## 2020-01-01 RX ADMIN — PRAVASTATIN SODIUM 20 MG: 20 TABLET ORAL at 09:44

## 2020-01-01 NOTE — PLAN OF CARE
Problem: Potential for Falls  Goal: Patient will remain free of falls  Description  INTERVENTIONS:  - Assess patient frequently for physical needs  -  Identify cognitive and physical deficits and behaviors that affect risk of falls    -  [place] fall precautions as indicated by assessment   - Educate patient/family on patient safety including physical limitations  - Instruct patient to call for assistance with activity based on assessment  - Modify environment to reduce risk of injury  - Consider OT/PT consult to assist with strengthening/mobility  Outcome: Progressing     Problem: PAIN - ADULT  Goal: Verbalizes/displays adequate comfort level or baseline comfort level  Description  Interventions:  - Encourage patient to monitor pain and request assistance  - Assess pain using appropriate pain scale  - Administer analgesics based on type and severity of pain and evaluate response  - Implement non-pharmacological measures as appropriate and evaluate response  - Consider cultural and social influences on pain and pain management  - Notify physician/advanced practitioner if interventions unsuccessful or patient reports new pain  Outcome: Progressing     Problem: INFECTION - ADULT  Goal: Absence or prevention of progression during hospitalization  Description  INTERVENTIONS:  - Assess and monitor for signs and symptoms of infection  - Monitor lab/diagnostic results  - Monitor all insertion sites, i e  indwelling lines, tubes, and drains  - Monitor endotracheal if appropriate and nasal secretions for changes in amount and color  - [place] appropriate cooling/warming therapies per order  - Administer medications as ordered  - Instruct and encourage patient and family to use good hand hygiene technique  - Identify and instruct in appropriate isolation precautions for identified infection/condition  Outcome: Progressing  Goal: Absence of fever/infection during neutropenic period  Description  INTERVENTIONS:  - Monitor WBC    Outcome: Progressing     Problem: SAFETY ADULT  Goal: Maintain or return to baseline ADL function  Description  INTERVENTIONS:  -  Assess patient's ability to carry out ADLs; assess patient's baseline for ADL function and identify physical deficits which impact ability to perform ADLs (bathing, care of mouth/teeth, toileting, grooming, dressing, etc )  - Assess/evaluate cause of self-care deficits   - Assess range of motion  - Assess patient's mobility; develop plan if impaired  - Assess patient's need for assistive devices and provide as appropriate  - Encourage maximum independence but intervene and supervise when necessary  - Involve family in performance of ADLs  - Assess for home care needs following discharge   - Consider OT consult to assist with ADL evaluation and planning for discharge  - Provide patient education as appropriate  Outcome: Progressing  Goal: Maintain or return mobility status to optimal level  Description  INTERVENTIONS:  - Assess patient's baseline mobility status (ambulation, transfers, stairs, etc )    - Identify cognitive and physical deficits and behaviors that affect mobility  - Identify mobility aids required to assist with transfers and/or ambulation (gait belt, sit-to-stand, lift, walker, cane, etc )  - [place] fall precautions as indicated by assessment  - Record patient progress and toleration of activity level on Mobility SBAR; progress patient to next Phase/Stage  - Instruct patient to call for assistance with activity based on assessment  - Consider rehabilitation consult to assist with strengthening/weightbearing, etc   Outcome: Progressing     Problem: DISCHARGE PLANNING  Goal: Discharge to home or other facility with appropriate resources  Description  INTERVENTIONS:  - Identify barriers to discharge w/patient and caregiver  - Arrange for needed discharge resources and transportation as appropriate  - Identify discharge learning needs (meds, wound care, etc )  - Arrange for interpretive services to assist at discharge as needed  - Refer to Case Management Department for coordinating discharge planning if the patient needs post-hospital services based on physician/advanced practitioner order or complex needs related to functional status, cognitive ability, or social support system  Outcome: Progressing     Problem: Knowledge Deficit  Goal: Patient/family/caregiver demonstrates understanding of disease process, treatment plan, medications, and discharge instructions  Description  Complete learning assessment and assess knowledge base    Interventions:  - Provide teaching at level of understanding  - Provide teaching via preferred learning methods  Outcome: Progressing

## 2020-01-01 NOTE — ASSESSMENT & PLAN NOTE
· Reports acute onset N/Von presentation  States has vomited 5-6 times  Denies abdominal pain  Denies diarrhea  Denies sick contacts  · CT abd/pelvis w/ completed in ED revealed " Moderate diffuse circumferential wall thickening of the distal duodenum and proximal jejunum and also circumferential wall thickening of multiple mid small bowel loops with mild surrounding fat stranding compatible with nonspecific (infectious, inflammatory, or ischemic) enteritis  No bowel obstruction  No free air  · WBC 18 24   LA initially 2 3, down to 1 6 after 2L NS bolus, now resolved   · GI was consulted, concern for ischemic colitis  · Mesenteric artery doppler was completed and negative   · Symptoms likely secondary to viral enteritis after discussion with GI  · Now tolerating diet and clinically appears much improved  · Plan for discharge today

## 2020-01-01 NOTE — ASSESSMENT & PLAN NOTE
· WBC on admission 18 24, now improving   · Likely 2/2 # 1  · Suspecting viral gastroenteritis at this time  · No further indication to continue antibiotics

## 2020-01-01 NOTE — DISCHARGE SUMMARY
Discharge- Jaylon Bryanna 1935, 81 y o  female MRN: [id number]    Unit/Bed#: -01 Encounter: [id number]    Primary Care Provider: Antonio Pearce MD   Date and time admitted to hospital: 12/30/2019  7:58 PM        * Enteritis  Assessment & Plan  · Reports acute onset N/Von presentation  States has vomited 5-6 times  Denies abdominal pain  Denies diarrhea  Denies sick contacts  · CT abd/pelvis w/ completed in ED revealed " Moderate diffuse circumferential wall thickening of the distal duodenum and proximal jejunum and also circumferential wall thickening of multiple mid small bowel loops with mild surrounding fat stranding compatible with nonspecific (infectious, inflammatory, or ischemic) enteritis  No bowel obstruction  No free air  · WBC 18 24   LA initially 2 3, down to 1 6 after 2L NS bolus, now resolved   · GI was consulted, concern for ischemic colitis  · Mesenteric artery duplex was completed and negative   · Symptoms likely secondary to viral enteritis after discussion with GI  · Now tolerating diet and clinically appears much improved  · Plan for discharge today    Non-intractable vomiting with nausea  Assessment & Plan  · Likely 2/2 # 1  · Now resolved     Essential hypertension  Assessment & Plan  · Hypertensive in /90  · Continue home dose HCTZ, Lisinopril and Toprol XL  · Monitor BP per unit protocol  · Hydralazine IV PRN SBP > 180  · Now with better control    Leukocytosis  Assessment & Plan  · WBC on admission 18 24, now improving   · Likely 2/2 # 1  · Suspecting viral gastroenteritis at this time  · No further indication to continue antibiotics     Mixed hyperlipidemia  Assessment & Plan  · Continue home dose Pravachol    Type 2 diabetes mellitus without complication, without long-term current use of insulin [facility])  Assessment & Plan  Lab Results   Component Value Date    HGBA1C 6 5 (H) 12/23/2019       Recent Labs     12/31/19  1626 12/31/19  2140 01/01/20  0108 01/01/20  4736 POCGLU 120 154* 102 98       Blood Sugar Average: Last 72 hrs:  (P) 401 [id number]     · Hold home dose Glucophage while inpt  · Diet restarted and tolerating well          Discharging Physician / Practitioner: Geovanni Beck MD  PCP: Olivia Tovar MD  Admission Date:   Admission Orders (From admission, onward)     Ordered        12/31/19 1409  Inpatient Admission  Once         12/31/19 1127  Place in Observation  Once         12/31/19 1118  Inpatient Admission  Once         12/30/19 2314  Place in Observation  Once                   Discharge Date: 01/01/20    Disposition:      Other: Home    For Discharges to [facility] SNF:   · Not Applicable to this Patient - Not Applicable to this Patient    Reason for Admission: Nausea and vomiting    Discharge Diagnoses:     Please see assessment and plan section above for further details regarding discharge diagnoses  Resolved Problems  Date Reviewed: 12/31/2019    None          Consultations During Hospital Stay:  · GI    Procedures Performed:   · None     Medication Adjustments and Discharge Medications:  · Summary of Medication Adjustments made as a result of this hospitalization: See med rec  · Medication Dosing Tapers - Please refer to Discharge Medication List for details on any medication dosing tapers (if applicable to patient)  · Medications being temporarily held (include recommended restart time): See med rec  · Discharge Medication List: See after visit summary for reconciled discharge medications  Wound Care Recommendations:  When applicable, please see wound care section of After Visit Summary  Diet Recommendations at Discharge:  Diet -        Diet Orders   (From admission, onward)             Start     Ordered    12/31/19 1756  Diet Regular; Regular House;  Lo Fiber/Lo Residue  Diet effective now     Question Answer Comment   Diet Type Regular    Regular Regular House    Other Restriction(s): Lo Fiber/Lo Residue    RD to adjust diet per protocol? Yes        12/31/19 2901                Instructions for any Catheters / Lines Present at Discharge (including removal date, if applicable): NA    Significant Findings / Test Results:   CT A/P  1  Moderate diffuse circumferential wall thickening of the distal duodenum and proximal jejunum and also circumferential wall thickening of multiple mid small bowel loops with mild surrounding fat stranding compatible with nonspecific (infectious,   inflammatory, or ischemic) enteritis  No bowel obstruction  No free air  2   Diverticulosis without evidence of diverticulitis  3   Small hiatal hernia  4   Large appendicolith within the appendix  No findings to suggest acute appendicitis  Vascular duplex  The proximal  aorta is patent  The celiac, splenic and hepatic arteries are patent  There is <70% stenosis of the mid superior mesenteric artery  The mid and distal aorta, bilateral renal, and inferior mesenteric arteries  were not visualized  Incidental Findings:   · NA     Test Results Pending at Discharge (will require follow up): · None     Outpatient Tests Requested:  · None    Complications:  None    Hospital Course:     Norma Maloney is a 81 y o  female patient who originally presented to the hospital on 12/30/2019 due to worsening nausea and vomiting and admitted for enteritis given CT imaging findings described above  GI was consulted initially there was concern for potential ischemic colitis versus infectious etiology  Mesenteric artery duplex was ordered which did not show any severe ischemia that would indicate ischemic colitis as a diagnosis  After discussion with GI, patient's symptoms likely attributed to viral enteritis thus antibiotics no longer warranted  Diet was advanced and patient clinically appeared to be much improved on January 1, 2020  White count resolved an outpatient stable for discharge        Condition at Discharge: good     Discharge Day Visit / Exam: Subjective:  Patient feels well today  Vitals: Blood Pressure: 150/72 (01/01/20 0801)  Pulse: 75 (01/01/20 0701)  Temperature: 98 °F (36 7 °C) (01/01/20 0701)  Temp Source: Oral (01/01/20 0701)  Respirations: 18 (01/01/20 0701)  Height: 5' 2" (157 5 cm) (12/31/19 1628)  Weight - Scale: 83 5 kg (184 lb 1 4 oz) (12/31/19 1628)  SpO2: 98 % (01/01/20 0701)  Exam:   Physical Exam   Constitutional: She is oriented to person, place, and time  She appears well-developed and well-nourished  Cardiovascular: Normal rate and regular rhythm  Pulmonary/Chest: Effort normal and breath sounds normal    Abdominal: Soft  Bowel sounds are normal    Neurological: She is alert and oriented to person, place, and time  Skin: Skin is warm and dry  Psychiatric: She has a normal mood and affect  Her behavior is normal        Discussion with Family: Nausea and vomiting, viral enteritis     Goals of Care Discussions:  · Code Status at Discharge: Level 1 - Full Code  · Were there any Goals of Care Discussions during Hospitalization?: No  · Results of any General Goals of Care Discussions: NA   · POLST Completed: No   · If POLST Completed, Summary of POLST Agreement Provided Here: NA   · OK to Rehospitalize if Needed? No    Discharge instructions/Information to patient and family:   See after visit summary section titled Discharge Instructions for information provided to patient and family  Planned Readmission: None      Discharge Statement:  I spent 30 minutes discharging the patient  This time was spent on the day of discharge  I had direct contact with the patient on the day of discharge  Greater than 50% of the total time was spent examining patient, answering all patient questions, arranging and discussing plan of care with patient as well as directly providing post-discharge instructions  Additional time then spent on discharge activities      ** Please Note: This note has been constructed using a voice recognition system **

## 2020-01-01 NOTE — ASSESSMENT & PLAN NOTE
Lab Results   Component Value Date    HGBA1C 6 5 (H) 12/23/2019       Recent Labs     12/31/19  1626 12/31/19  2140 01/01/20  0108 01/01/20  0619   POCGLU 120 154* 102 98       Blood Sugar Average: Last 72 hrs:  (P) 100 [id number]     · Hold home dose Glucophage while inpt  · Diet restarted and tolerating well

## 2020-01-02 ENCOUNTER — TRANSITIONAL CARE MANAGEMENT (OUTPATIENT)
Dept: INTERNAL MEDICINE CLINIC | Facility: CLINIC | Age: 85
End: 2020-01-02

## 2020-01-03 LAB
ATRIAL RATE: 73 BPM
P AXIS: 56 DEGREES
PR INTERVAL: 174 MS
QRS AXIS: -12 DEGREES
QRSD INTERVAL: 88 MS
QT INTERVAL: 386 MS
QTC INTERVAL: 425 MS
T WAVE AXIS: 71 DEGREES
VENTRICULAR RATE: 73 BPM

## 2020-01-03 PROCEDURE — 93010 ELECTROCARDIOGRAM REPORT: CPT | Performed by: INTERNAL MEDICINE

## 2020-01-05 LAB
BACTERIA BLD CULT: NORMAL
BACTERIA BLD CULT: NORMAL

## 2020-01-14 ENCOUNTER — OFFICE VISIT (OUTPATIENT)
Dept: INTERNAL MEDICINE CLINIC | Facility: CLINIC | Age: 85
End: 2020-01-14
Payer: MEDICARE

## 2020-01-14 ENCOUNTER — APPOINTMENT (OUTPATIENT)
Dept: LAB | Facility: CLINIC | Age: 85
End: 2020-01-14
Payer: MEDICARE

## 2020-01-14 VITALS
TEMPERATURE: 99.1 F | DIASTOLIC BLOOD PRESSURE: 88 MMHG | HEIGHT: 62 IN | SYSTOLIC BLOOD PRESSURE: 126 MMHG | OXYGEN SATURATION: 97 % | HEART RATE: 66 BPM | WEIGHT: 178.8 LBS | BODY MASS INDEX: 32.9 KG/M2

## 2020-01-14 DIAGNOSIS — K55.1 CHRONIC VASCULAR DISORDERS OF INTESTINE (HCC): ICD-10-CM

## 2020-01-14 DIAGNOSIS — E78.2 MIXED HYPERLIPIDEMIA: Chronic | ICD-10-CM

## 2020-01-14 DIAGNOSIS — I10 ESSENTIAL HYPERTENSION: Chronic | ICD-10-CM

## 2020-01-14 DIAGNOSIS — D72.829 LEUKOCYTOSIS, UNSPECIFIED TYPE: ICD-10-CM

## 2020-01-14 DIAGNOSIS — R10.33 PERIUMBILICAL ABDOMINAL PAIN: ICD-10-CM

## 2020-01-14 DIAGNOSIS — Z78.0 ASYMPTOMATIC POSTMENOPAUSAL STATE: Primary | ICD-10-CM

## 2020-01-14 DIAGNOSIS — E11.9 TYPE 2 DIABETES MELLITUS WITHOUT COMPLICATION, WITHOUT LONG-TERM CURRENT USE OF INSULIN (HCC): Chronic | ICD-10-CM

## 2020-01-14 LAB
ALBUMIN SERPL BCP-MCNC: 3.8 G/DL (ref 3.5–5)
ANION GAP SERPL CALCULATED.3IONS-SCNC: 3 MMOL/L (ref 4–13)
BASOPHILS # BLD AUTO: 0.05 THOUSANDS/ΜL (ref 0–0.1)
BASOPHILS NFR BLD AUTO: 0 % (ref 0–1)
BUN SERPL-MCNC: 13 MG/DL (ref 5–25)
CALCIUM ALBUM COR SERPL-MCNC: 10.4 MG/DL (ref 8.3–10.1)
CALCIUM SERPL-MCNC: 10.2 MG/DL (ref 8.3–10.1)
CALCIUM SERPL-MCNC: 10.2 MG/DL (ref 8.3–10.1)
CHLORIDE SERPL-SCNC: 105 MMOL/L (ref 100–108)
CO2 SERPL-SCNC: 33 MMOL/L (ref 21–32)
CREAT SERPL-MCNC: 0.79 MG/DL (ref 0.6–1.3)
EOSINOPHIL # BLD AUTO: 0.17 THOUSAND/ΜL (ref 0–0.61)
EOSINOPHIL NFR BLD AUTO: 1 % (ref 0–6)
ERYTHROCYTE [DISTWIDTH] IN BLOOD BY AUTOMATED COUNT: 15.7 % (ref 11.6–15.1)
GFR SERPL CREATININE-BSD FRML MDRD: 80 ML/MIN/1.73SQ M
GLUCOSE SERPL-MCNC: 72 MG/DL (ref 65–140)
HCT VFR BLD AUTO: 40.4 % (ref 34.8–46.1)
HGB BLD-MCNC: 12.8 G/DL (ref 11.5–15.4)
IMM GRANULOCYTES # BLD AUTO: 0.05 THOUSAND/UL (ref 0–0.2)
IMM GRANULOCYTES NFR BLD AUTO: 0 % (ref 0–2)
LYMPHOCYTES # BLD AUTO: 2.74 THOUSANDS/ΜL (ref 0.6–4.47)
LYMPHOCYTES NFR BLD AUTO: 23 % (ref 14–44)
MCH RBC QN AUTO: 25.4 PG (ref 26.8–34.3)
MCHC RBC AUTO-ENTMCNC: 31.7 G/DL (ref 31.4–37.4)
MCV RBC AUTO: 80 FL (ref 82–98)
MONOCYTES # BLD AUTO: 0.91 THOUSAND/ΜL (ref 0.17–1.22)
MONOCYTES NFR BLD AUTO: 8 % (ref 4–12)
NEUTROPHILS # BLD AUTO: 8.15 THOUSANDS/ΜL (ref 1.85–7.62)
NEUTS SEG NFR BLD AUTO: 68 % (ref 43–75)
NRBC BLD AUTO-RTO: 0 /100 WBCS
PLATELET # BLD AUTO: 362 THOUSANDS/UL (ref 149–390)
PMV BLD AUTO: 8.7 FL (ref 8.9–12.7)
POTASSIUM SERPL-SCNC: 4.4 MMOL/L (ref 3.5–5.3)
RBC # BLD AUTO: 5.03 MILLION/UL (ref 3.81–5.12)
SODIUM SERPL-SCNC: 141 MMOL/L (ref 136–145)
WBC # BLD AUTO: 12.07 THOUSAND/UL (ref 4.31–10.16)

## 2020-01-14 PROCEDURE — 80048 BASIC METABOLIC PNL TOTAL CA: CPT

## 2020-01-14 PROCEDURE — 82040 ASSAY OF SERUM ALBUMIN: CPT

## 2020-01-14 PROCEDURE — 36415 COLL VENOUS BLD VENIPUNCTURE: CPT

## 2020-01-14 PROCEDURE — 99495 TRANSJ CARE MGMT MOD F2F 14D: CPT | Performed by: PHYSICIAN ASSISTANT

## 2020-01-14 PROCEDURE — 85025 COMPLETE CBC W/AUTO DIFF WBC: CPT

## 2020-01-15 NOTE — PROGRESS NOTES
Assessment/Plan:  I reviewed all of her hospital records check CBC and chemistries slightly low potassium and leukocytosis  She feels well physical exam unremarkable    No problem-specific Assessment & Plan notes found for this encounter  Diagnoses and all orders for this visit:    Asymptomatic postmenopausal state  -     DXA bone density spine hip and pelvis; Future    Chronic vascular disorders of intestine (HCC)    Type 2 diabetes mellitus without complication, without long-term current use of insulin (HCC)    Essential hypertension    Mixed hyperlipidemia    Leukocytosis, unspecified type  -     CBC and differential; Future  -     Basic metabolic panel; Future    Periumbilical abdominal pain  -     CBC and differential; Future  -     Basic metabolic panel; Future        Subjective:     Patient ID: Bassam Vargas is a 81 y o  female  She was hospitalized for 3 days 2 weeks ago because of nausea vomiting intractable and periumbilical pain she  Was treated with antiemetics and her symptoms resolved  She was worked up for ischemic colitis  Her workup was negative she sees some IV fluid at the time of discharge slightly elevated white count a little anemic and low potassium  Since her discharge she is normally active and well ambulatory eating well no vomiting nausea or diarrhea hypertension hyperlipidemia well controlled with medicine diabetes well controlled with oral meds      The following portions of the patient's history were reviewed and updated as appropriate:  She  has a past medical history of Abnormal TSH (10/16/2017), Diabetes mellitus ([facility] [address] ), GERD (gastroesophageal reflux disease), Hyperlipidemia, Hypertension, Perforated appendicitis (7/1/2018), and Sepsis ([facility] [address] ) (7/6/2018)    She   Patient Active Problem List    Diagnosis Date Noted    Chronic vascular disorders of intestine ([facility] ) 01/14/2020    Obesity (BMI 30-39 9) 04/15/2019    Back pain 07/01/2018    Essential hypertension 06/05/2018  Colitis 2018    Type 2 diabetes mellitus without complication, without long-term current use of insulin ([facility] [address] ) 10/02/2017    Mixed hyperlipidemia 10/02/2017     She  has a past surgical history that includes  section; Cholecystectomy; Neck surgery; and pr colonoscopy flx dx w/collj spec when pfrmd (N/A, 3/1/2018)  Her family history includes No Known Problems in her father and mother  She  reports that she has never smoked  She has never used smokeless tobacco  She reports that she drank alcohol  She reports that she does not use drugs  Current Outpatient Medications   Medication Sig Dispense Refill    amLODIPine (NORVASC) 5 mg tablet Take 1 tablet (5 mg total) by mouth daily 90 tablet 2    Calcium Carbonate-Vitamin D3 (CALCIUM 600/VITAMIN D) 600-400 MG-UNIT TABS Take by mouth      hydrochlorothiazide (HYDRODIURIL) 12 5 mg tablet Take 1 tablet (12 5 mg total) by mouth daily 90 tablet 2    lisinopril (ZESTRIL) 40 mg tablet Take 1 tablet (40 mg total) by mouth daily 90 tablet 2    metFORMIN (GLUCOPHAGE-XR) 500 mg 24 hr tablet Take 1 tablet (500 mg total) by mouth 2 (two) times a day 180 tablet 2    metoprolol succinate (TOPROL-XL) 100 mg 24 hr tablet Take 1 tablet (100 mg total) by mouth daily 90 tablet 2    pravastatin (PRAVACHOL) 20 mg tablet Take 1 tablet (20 mg total) by mouth daily 90 tablet 2     No current facility-administered medications for this visit  She has No Known Allergies       Review of Systems   Constitutional: Negative for activity change, appetite change, chills, diaphoresis, fatigue, fever and unexpected weight change  HENT: Negative for congestion, dental problem, drooling, ear discharge, ear pain, facial swelling, hearing loss, nosebleeds, postnasal drip, rhinorrhea, sinus pressure, sinus pain, sneezing, sore throat, tinnitus, trouble swallowing and voice change  Eyes: Negative for photophobia, pain, discharge, redness, itching and visual disturbance  Respiratory: Negative for apnea, cough, choking, chest tightness, shortness of breath, wheezing and stridor  Cardiovascular: Negative for chest pain, palpitations and leg swelling  Gastrointestinal: Negative for abdominal distention, abdominal pain, anal bleeding, blood in stool, constipation, diarrhea, nausea, rectal pain and vomiting  Endocrine: Negative for cold intolerance, heat intolerance, polydipsia, polyphagia and polyuria  Genitourinary: Negative for decreased urine volume, difficulty urinating, dysuria, enuresis, flank pain, frequency, genital sores, hematuria and urgency  Musculoskeletal: Negative for arthralgias, back pain, gait problem, joint swelling, myalgias, neck pain and neck stiffness  Skin: Negative for color change, pallor, rash and wound  Neurological: Negative for dizziness, tremors, seizures, syncope, facial asymmetry, speech difficulty, weakness, light-headedness, numbness and headaches  Hematological: Negative for adenopathy  Does not bruise/bleed easily  Psychiatric/Behavioral: Negative for agitation, behavioral problems, confusion, decreased concentration, dysphoric mood, hallucinations, self-injury, sleep disturbance and suicidal ideas  The patient is not nervous/anxious and is not hyperactive  Objective:     Physical Exam   Constitutional: She is oriented to person, place, and time  She appears well-developed  HENT:   Head: Normocephalic  Right Ear: External ear normal    Left Ear: External ear normal    Mouth/Throat: Oropharynx is clear and moist    Eyes: Pupils are equal, round, and reactive to light  Conjunctivae are normal    Neck: Neck supple  No thyromegaly present  Cardiovascular: Normal rate, regular rhythm, normal heart sounds and intact distal pulses  Pulmonary/Chest: Effort normal and breath sounds normal  No stridor  No respiratory distress  She has no wheezes  Abdominal: Soft   Bowel sounds are normal  She exhibits no distension and no mass  There is no tenderness  There is no rebound and no guarding  No hernia  Musculoskeletal: Normal range of motion  She exhibits no edema  Neurological: She is alert and oriented to person, place, and time  She has normal reflexes  Skin: Skin is warm and dry  Vitals reviewed  Vitals:    01/14/20 1326   BP: 126/88   BP Location: Left arm   Patient Position: Sitting   Cuff Size: Standard   Pulse: 66   Temp: 99 1 °F (37 3 °C)   TempSrc: Tympanic   SpO2: 97%   Weight: 81 1 kg (178 lb 12 8 oz)   Height: 5' 2" (1 575 m)       Transitional Care Management Review:  Linh Sullivan is a 81 y o  female here for TCM follow up  During the TCM phone call patient stated:    TCM Call (since 12/14/2019)     Date and time call was made  1/2/2020  9:48 AM    Hospital care reviewed  Records reviewed    Patient was hospitial[facility] at  [facility]    Date of Admission  12/30/19    Date of discharge  01/01/20    Diagnosis  N/V,  Enteritis    Disposition  Home    Were the patients medications reviewed and updated  No <img src='C:FILES (X86)    Current Symptoms  None      TCM Call (since 12/14/2019)     Post hospital issues  None    Scheduled for follow up?   Yes    Did you obtain your prescribed medications  Yes <img src='C:FILES (X86)    Do you need help managing your prescriptions or medications  No    Is transportation to your appointment needed  No    I have advised the patient to call PCP with any new or worsening symptoms  Sadia Ponce LPN    Living Arrangements  Children    Are you recieving any outpatient services  No    Are you recieving home care services  No    Interperter language line needed  No    Counseling  Patient    Counseling topics  Importance of RX compliance              Jeanmarie Finch PA-C

## 2020-01-15 NOTE — PHYSICIAN ADVISOR
Current patient class: Inpatient  The patient is currently on Hospital Day: 3 at [address]      The patient was admitted to the hospital at 1410 on 12/31/19 for the following diagnosis:  Dehydration [E86 0]  Enteritis [K52 9]  Leukocytosis [D72 829]  Vomiting [R11 10]  Nausea & vomiting [R11 2]       There is documentation in the medical record of an expected length of stay of at least 2 midnights  The patient is therefore expected to satisfy the 2 midnight benchmark and given the 2 midnight presumption is appropriate for INPATIENT ADMISSION  Given this expectation of a satisfying stay, CMS instructs us that the patient is most often appropriate for inpatient admission under part A provided medical necessity is documented in the chart  After review of the relevant documentation, labs, vital signs and test results, the patient is appropriate for INPATIENT ADMISSION  Admission to the hospital as an inpatient is a complex decision making process which requires the practitioner to consider the patients presenting complaint, history and physical examination and all relevant testing  With this in mind, in this case, the patient was deemed appropriate for INPATIENT ADMISSION  After review of the documentation and testing available at the time of the admission I concur with this clinical determination of medical necessity  Rationale is as follows:     The patient is a 81 yrs old Female who presented to the ED at 12/30/2019  7:58 PM with a chief complaint of Vomiting (States having a sick feeling in stomach since this AM  Denies any other symptoms, except short of breath)   Patient had ct abd/pelvis  Moderate diffuse circumferential wall thickening of the distal duodenum and proximal jejunum and also circumferential wall thickening of multiple mid small bowel loops with mild surrounding fat stranding compatible with nonspecific (infectious, inflammatory, or ischemic) enteritis and WBC 18 24 and lactic acidosis  She was treated with IVF and potassium of 3 1 replaced  GI was consulted and mesenteric artery duplex was negative and ischemic colitis rued out  Patient symptomatically improved by discharge  The patients vitals on arrival were ED Triage Vitals   Temperature Pulse Respirations Blood Pressure SpO2   19 1921 19 1837 19 18319 1837 19 183   97 5 °F (36 4 °C) 94 18 (!) 188/86 100 %      Temp Source Heart Rate Source Patient Position - Orthostatic VS BP Location FiO2 (%)   19 18319 --   Oral Monitor Sitting Left arm       Pain Score       19       No Pain           Past Medical History:   Diagnosis Date    Abnormal TSH 10/16/2017    Diabetes mellitus ([facility] [address] )     GERD (gastroesophageal reflux disease)     Hyperlipidemia     Hypertension     Perforated appendicitis 2018    Sepsis ([facility] [address] ) 2018     Past Surgical History:   Procedure Laterality Date     SECTION      CHOLECYSTECTOMY      NECK SURGERY      MI COLONOSCOPY FLX DX W/COLLJ SPEC WHEN PFRMD N/A 3/1/2018    Procedure: COLONOSCOPY;  Surgeon: Jesse Weber MD;  Location: [facility]; Service: Gastroenterology           Consults have been placed to:   IP CONSULT TO GASTROENTEROLOGY    Vitals:    19 1628 19 2300 20 0701 20 0801   BP: 130/80 166/78 (!) 190/83 150/72   BP Location: Left arm Left arm Left arm Left arm   Pulse: 72 79 75    Resp: 22 20 18    Temp: 98 °F (36 7 °C) 99 2 °F (37 3 °C) 98 °F (36 7 °C)    TempSrc: Oral Oral Oral    SpO2: 97%  98%    Weight: 83 5 kg (184 lb 1 4 oz)      Height: 5' 2" (1 575 m)          Most recent labs:    Recent Labs     20  1354   WBC 12 07*   HGB 12 8   HCT 40 4      K 4 4   CALCIUM 10 2*   BUN 13   CREATININE 0 79       Scheduled Meds:  Continuous Infusions:  No current facility-administered medications for this encounter  PRN Meds:      Surgical procedures (if appropriate):

## 2020-01-31 ENCOUNTER — TELEPHONE (OUTPATIENT)
Dept: INTERNAL MEDICINE CLINIC | Facility: CLINIC | Age: 85
End: 2020-01-31

## 2020-01-31 DIAGNOSIS — I10 ESSENTIAL HYPERTENSION: ICD-10-CM

## 2020-01-31 RX ORDER — AMLODIPINE BESYLATE 10 MG/1
10 TABLET ORAL DAILY
Qty: 90 TABLET | Refills: 1 | Status: SHIPPED | OUTPATIENT
Start: 2020-01-31 | End: 2020-05-27 | Stop reason: SDUPTHER

## 2020-01-31 NOTE — TELEPHONE ENCOUNTER
Will need to stop the lisinopril if you had a reaction to it  I sent the increased dose of amlodipine to the pharmacy  You have to take 10 mg now  If the blood pressure still remains high, we will have to add another medication

## 2020-01-31 NOTE — TELEPHONE ENCOUNTER
Pt was seen yesterday at [address] for oral swelling  They told her to stop taking lisinopril  Wants to verify with this with Dr Khanh Kaur  She scheduled her ER f/u for Monday but doesn't want to go through the weekend without taking lisinopril  Couldn't come in today because her daughter drives her and she is working   Please advise     [phone]

## 2020-02-03 ENCOUNTER — OFFICE VISIT (OUTPATIENT)
Dept: INTERNAL MEDICINE CLINIC | Facility: CLINIC | Age: 85
End: 2020-02-03
Payer: MEDICARE

## 2020-02-03 VITALS
HEART RATE: 80 BPM | HEIGHT: 62 IN | OXYGEN SATURATION: 96 % | BODY MASS INDEX: 32.79 KG/M2 | WEIGHT: 178.2 LBS | SYSTOLIC BLOOD PRESSURE: 134 MMHG | DIASTOLIC BLOOD PRESSURE: 68 MMHG

## 2020-02-03 DIAGNOSIS — T78.3XXD ANGIOEDEMA, SUBSEQUENT ENCOUNTER: Primary | ICD-10-CM

## 2020-02-03 DIAGNOSIS — I10 ESSENTIAL HYPERTENSION: Chronic | ICD-10-CM

## 2020-02-03 DIAGNOSIS — E66.9 OBESITY (BMI 30-39.9): ICD-10-CM

## 2020-02-03 PROCEDURE — 1036F TOBACCO NON-USER: CPT | Performed by: INTERNAL MEDICINE

## 2020-02-03 PROCEDURE — 4040F PNEUMOC VAC/ADMIN/RCVD: CPT | Performed by: INTERNAL MEDICINE

## 2020-02-03 PROCEDURE — 99213 OFFICE O/P EST LOW 20 MIN: CPT | Performed by: INTERNAL MEDICINE

## 2020-02-03 PROCEDURE — 1160F RVW MEDS BY RX/DR IN RCRD: CPT | Performed by: INTERNAL MEDICINE

## 2020-02-03 PROCEDURE — 3078F DIAST BP <80 MM HG: CPT | Performed by: INTERNAL MEDICINE

## 2020-02-03 PROCEDURE — 2022F DILAT RTA XM EVC RTNOPTHY: CPT | Performed by: INTERNAL MEDICINE

## 2020-02-03 PROCEDURE — 3008F BODY MASS INDEX DOCD: CPT | Performed by: INTERNAL MEDICINE

## 2020-02-03 PROCEDURE — 1111F DSCHRG MED/CURRENT MED MERGE: CPT | Performed by: INTERNAL MEDICINE

## 2020-02-03 PROCEDURE — 3075F SYST BP GE 130 - 139MM HG: CPT | Performed by: INTERNAL MEDICINE

## 2020-02-03 NOTE — PROGRESS NOTES
INTERNAL MEDICINE FOLLOW-UP OFFICE VISIT  [facility]    NAME: Sejal Morrison  AGE: 81 y o  SEX: female  : 1935   MRN: [id number]    DATE: 2/3/2020  TIME: 10:22 AM    Assessment and Plan     Diagnoses and all orders for this visit:    Angioedema, subsequent encounter   she was recently in the hospital with angioedema and was told to discontinue the lisinopril  I agree with that  Essential hypertension  As she was off the lisinopril, I increase the dose of amlodipine to 10 mg daily  Her blood pressure is very well controlled and she will remain at the same regimen  Obesity (BMI 30-39  9)    BMI Counseling: Body mass index is 32 59 kg/m²  The BMI is above normal  Nutrition recommendations include decreasing portion sizes, encouraging healthy choices of fruits and vegetables and moderation in carbohydrate intake  Exercise recommendations include moderate physical activity 150 minutes/week  No pharmacotherapy was ordered  - Counseling Documentation: patient was counseled regarding: instructions for management, risk factor reductions, prognosis, patient and family education, risks and benefits of treatment options and importance of compliance with treatment  - Medication Side Effects: Adverse side effects of medications were reviewed with the patient/guardian today  Return to office in:  As needed    Chief Complaint     Chief Complaint   Patient presents with    Follow-up     was in [facility] ER, tongue was swollen  History of Present Illness     HPI  Patient has been on lisinopril for about 30 years but few days ago she developed swelling of her tongue and went to the ER where she was told that she has angioedema due to the lisinopril and it was discontinued  She is doing well now          The following portions of the patient's history were reviewed and updated as appropriate: allergies, current medications, past family history, past medical history, past social history, past surgical history and problem list     Review of Systems     Review of Systems   Constitutional: Negative for chills, diaphoresis, fatigue and fever  HENT: Negative for congestion, ear discharge, ear pain, hearing loss, postnasal drip, rhinorrhea, sinus pressure, sinus pain, sneezing, sore throat and voice change  Eyes: Negative for pain, discharge, redness and visual disturbance  Respiratory: Negative for cough, chest tightness, shortness of breath and wheezing  Cardiovascular: Negative for chest pain, palpitations and leg swelling  Gastrointestinal: Negative for abdominal distention, abdominal pain, blood in stool, constipation, diarrhea, nausea and vomiting  Endocrine: Negative for cold intolerance, heat intolerance, polydipsia, polyphagia and polyuria  Genitourinary: Negative for dysuria, flank pain, frequency, hematuria and urgency  Musculoskeletal: Negative for arthralgias, back pain, gait problem, joint swelling, myalgias, neck pain and neck stiffness  Skin: Negative for rash  Neurological: Negative for dizziness, tremors, syncope, facial asymmetry, speech difficulty, weakness, light-headedness, numbness and headaches  Hematological: Does not bruise/bleed easily  Psychiatric/Behavioral: Negative for behavioral problems, confusion and sleep disturbance  The patient is not nervous/anxious  Active Problem List     Patient Active Problem List   Diagnosis    Type 2 diabetes mellitus without complication, without long-term current use of insulin (HCC)    Mixed hyperlipidemia    Colitis    Essential hypertension    Back pain    Obesity (BMI 30-39  9)    Chronic vascular disorders of intestine (HCC)       Objective     /68 (BP Location: Left arm, Patient Position: Sitting, Cuff Size: Standard)   Pulse 80   Ht 5' 2" (1 575 m)   Wt 80 8 kg (178 lb 3 2 oz)   SpO2 96%   BMI 32 59 kg/m²     Physical Exam   Constitutional: She is oriented to person, place, and time  She appears well-developed and well-nourished  No distress  HENT:   Head: Normocephalic and atraumatic  Right Ear: External ear normal    Left Ear: External ear normal    Nose: Nose normal    Mouth/Throat: Oropharynx is clear and moist    Eyes: Conjunctivae and EOM are normal  Right eye exhibits no discharge  Left eye exhibits no discharge  No scleral icterus  Neck: Normal range of motion  Neck supple  No JVD present  No tracheal deviation present  No thyromegaly present  Cardiovascular: Normal rate, regular rhythm, normal heart sounds and intact distal pulses  Exam reveals no gallop and no friction rub  No murmur heard  Pulmonary/Chest: Effort normal and breath sounds normal  No respiratory distress  She has no wheezes  She has no rales  She exhibits no tenderness  Abdominal: Soft  Bowel sounds are normal  She exhibits no distension  There is no tenderness  There is no rebound and no guarding  Musculoskeletal: Normal range of motion  She exhibits no edema or tenderness  Lymphadenopathy:     She has no cervical adenopathy  Neurological: She is alert and oriented to person, place, and time  No cranial nerve deficit  She exhibits normal muscle tone  Coordination normal    Skin: Skin is warm and dry  No rash noted  She is not diaphoretic  No erythema  Psychiatric: She has a normal mood and affect   Judgment normal          Current Medications       Current Outpatient Medications:     amLODIPine (NORVASC) 10 mg tablet, Take 1 tablet (10 mg total) by mouth daily, Disp: 90 tablet, Rfl: 1    Calcium Carbonate-Vitamin D3 (CALCIUM 600/VITAMIN D) 600-400 MG-UNIT TABS, Take by mouth, Disp: , Rfl:     hydrochlorothiazide (HYDRODIURIL) 12 5 mg tablet, Take 1 tablet (12 5 mg total) by mouth daily, Disp: 90 tablet, Rfl: 2    metFORMIN (GLUCOPHAGE-XR) 500 mg 24 hr tablet, Take 1 tablet (500 mg total) by mouth 2 (two) times a day, Disp: 180 tablet, Rfl: 2    metoprolol succinate (TOPROL-XL) 100 mg 24 hr tablet, Take 1 tablet (100 mg total) by mouth daily, Disp: 90 tablet, Rfl: 2    pravastatin (PRAVACHOL) 20 mg tablet, Take 1 tablet (20 mg total) by mouth daily, Disp: 90 tablet, Rfl: 2    Health Maintenance     Health Maintenance   Topic Date Due    DTaP,Tdap,and Td Vaccines (1 - Tdap) 05/07/1946    Hepatitis B Vaccine (1 of 3 - Risk 3-dose series) 05[birth year]07/1955    DXA SCAN  11/13/2019    BMI: Followup Plan  04/15/2020    Diabetic Foot Exam  04/15/2020    HEMOGLOBIN A1C  06/23/2020    DM Eye Exam  06/25/2020    URINE MICROALBUMIN  12/23/2020    Fall Risk  12/26/2020    Medicare Annual Wellness Visit (AWV)  12/26/2020    Pneumococcal Vaccine: 65+ Years (2 of 2 - PPSV23) 12/26/2020    BMI: Adult  01/14/2021    Depression Screening PHQ  02/03/2021    Hepatitis C Screening  Completed    Influenza Vaccine  Completed    Pneumococcal Vaccine: Pediatrics (0 to 5 Years) and At-Risk Patients (6 to 59 Years)  Aged Out    HIB Vaccine  Aged Out    IPV Vaccine  Aged Out    Hepatitis A Vaccine  Aged Out    Meningococcal ACWY Vaccine  Aged Out    HPV Vaccine  Aged [employer] History   Administered Date(s) Administered    Influenza Split High Dose Preservative Free IM 10/02/2017    Influenza, high dose seasonal 0 5 mL 10/08/2018, 12/26/2019    Pneumococcal Conjugate 13-Valent 12/26/2019         MD [facility]  Luis M

## 2020-05-27 DIAGNOSIS — I10 ESSENTIAL HYPERTENSION: ICD-10-CM

## 2020-05-27 RX ORDER — AMLODIPINE BESYLATE 10 MG/1
10 TABLET ORAL DAILY
Qty: 90 TABLET | Refills: 1 | Status: SHIPPED | OUTPATIENT
Start: 2020-05-27 | End: 2020-06-20 | Stop reason: SDUPTHER

## 2020-06-09 ENCOUNTER — TELEPHONE (OUTPATIENT)
Dept: INTERNAL MEDICINE CLINIC | Facility: CLINIC | Age: 85
End: 2020-06-09

## 2020-06-13 ENCOUNTER — APPOINTMENT (OUTPATIENT)
Dept: LAB | Facility: CLINIC | Age: 85
End: 2020-06-13
Payer: MEDICARE

## 2020-06-13 DIAGNOSIS — E78.2 MIXED HYPERLIPIDEMIA: ICD-10-CM

## 2020-06-13 DIAGNOSIS — E11.9 TYPE 2 DIABETES MELLITUS WITHOUT COMPLICATION, WITHOUT LONG-TERM CURRENT USE OF INSULIN (HCC): ICD-10-CM

## 2020-06-13 LAB
ALBUMIN SERPL BCP-MCNC: 3.6 G/DL (ref 3.5–5)
ALP SERPL-CCNC: 70 U/L (ref 46–116)
ALT SERPL W P-5'-P-CCNC: 16 U/L (ref 12–78)
ANION GAP SERPL CALCULATED.3IONS-SCNC: 3 MMOL/L (ref 4–13)
AST SERPL W P-5'-P-CCNC: 9 U/L (ref 5–45)
BASOPHILS # BLD AUTO: 0.04 THOUSANDS/ΜL (ref 0–0.1)
BASOPHILS NFR BLD AUTO: 0 % (ref 0–1)
BILIRUB SERPL-MCNC: 0.44 MG/DL (ref 0.2–1)
BUN SERPL-MCNC: 10 MG/DL (ref 5–25)
CALCIUM SERPL-MCNC: 10 MG/DL (ref 8.3–10.1)
CHLORIDE SERPL-SCNC: 104 MMOL/L (ref 100–108)
CHOLEST SERPL-MCNC: 138 MG/DL (ref 50–200)
CO2 SERPL-SCNC: 29 MMOL/L (ref 21–32)
CREAT SERPL-MCNC: 0.79 MG/DL (ref 0.6–1.3)
CREAT UR-MCNC: 24 MG/DL
EOSINOPHIL # BLD AUTO: 0.2 THOUSAND/ΜL (ref 0–0.61)
EOSINOPHIL NFR BLD AUTO: 2 % (ref 0–6)
ERYTHROCYTE [DISTWIDTH] IN BLOOD BY AUTOMATED COUNT: 16.6 % (ref 11.6–15.1)
EST. AVERAGE GLUCOSE BLD GHB EST-MCNC: 137 MG/DL
GFR SERPL CREATININE-BSD FRML MDRD: 79 ML/MIN/1.73SQ M
GLUCOSE P FAST SERPL-MCNC: 115 MG/DL (ref 65–99)
HBA1C MFR BLD: 6.4 %
HCT VFR BLD AUTO: 39.8 % (ref 34.8–46.1)
HDLC SERPL-MCNC: 46 MG/DL
HGB BLD-MCNC: 12.7 G/DL (ref 11.5–15.4)
IMM GRANULOCYTES # BLD AUTO: 0.07 THOUSAND/UL (ref 0–0.2)
IMM GRANULOCYTES NFR BLD AUTO: 1 % (ref 0–2)
LDLC SERPL CALC-MCNC: 63 MG/DL (ref 0–100)
LYMPHOCYTES # BLD AUTO: 2.25 THOUSANDS/ΜL (ref 0.6–4.47)
LYMPHOCYTES NFR BLD AUTO: 21 % (ref 14–44)
MCH RBC QN AUTO: 25.3 PG (ref 26.8–34.3)
MCHC RBC AUTO-ENTMCNC: 31.9 G/DL (ref 31.4–37.4)
MCV RBC AUTO: 79 FL (ref 82–98)
MICROALBUMIN UR-MCNC: 233 MG/L (ref 0–20)
MICROALBUMIN/CREAT 24H UR: 971 MG/G CREATININE (ref 0–30)
MONOCYTES # BLD AUTO: 0.7 THOUSAND/ΜL (ref 0.17–1.22)
MONOCYTES NFR BLD AUTO: 7 % (ref 4–12)
NEUTROPHILS # BLD AUTO: 7.53 THOUSANDS/ΜL (ref 1.85–7.62)
NEUTS SEG NFR BLD AUTO: 69 % (ref 43–75)
NONHDLC SERPL-MCNC: 92 MG/DL
NRBC BLD AUTO-RTO: 0 /100 WBCS
PLATELET # BLD AUTO: 366 THOUSANDS/UL (ref 149–390)
PMV BLD AUTO: 8.9 FL (ref 8.9–12.7)
POTASSIUM SERPL-SCNC: 4 MMOL/L (ref 3.5–5.3)
PROT SERPL-MCNC: 8.4 G/DL (ref 6.4–8.2)
RBC # BLD AUTO: 5.02 MILLION/UL (ref 3.81–5.12)
SODIUM SERPL-SCNC: 136 MMOL/L (ref 136–145)
TRIGL SERPL-MCNC: 147 MG/DL
TSH SERPL DL<=0.05 MIU/L-ACNC: 1.73 UIU/ML (ref 0.36–3.74)
WBC # BLD AUTO: 10.79 THOUSAND/UL (ref 4.31–10.16)

## 2020-06-13 PROCEDURE — 80053 COMPREHEN METABOLIC PANEL: CPT

## 2020-06-13 PROCEDURE — 82043 UR ALBUMIN QUANTITATIVE: CPT

## 2020-06-13 PROCEDURE — 36415 COLL VENOUS BLD VENIPUNCTURE: CPT

## 2020-06-13 PROCEDURE — 82570 ASSAY OF URINE CREATININE: CPT

## 2020-06-13 PROCEDURE — 83036 HEMOGLOBIN GLYCOSYLATED A1C: CPT

## 2020-06-13 PROCEDURE — 84443 ASSAY THYROID STIM HORMONE: CPT

## 2020-06-13 PROCEDURE — 85025 COMPLETE CBC W/AUTO DIFF WBC: CPT

## 2020-06-13 PROCEDURE — 80061 LIPID PANEL: CPT

## 2020-06-19 ENCOUNTER — TELEPHONE (OUTPATIENT)
Dept: INTERNAL MEDICINE CLINIC | Facility: CLINIC | Age: 85
End: 2020-06-19

## 2020-06-20 ENCOUNTER — OFFICE VISIT (OUTPATIENT)
Dept: INTERNAL MEDICINE CLINIC | Facility: CLINIC | Age: 85
End: 2020-06-20
Payer: MEDICARE

## 2020-06-20 VITALS
HEIGHT: 62 IN | DIASTOLIC BLOOD PRESSURE: 76 MMHG | SYSTOLIC BLOOD PRESSURE: 130 MMHG | WEIGHT: 176 LBS | BODY MASS INDEX: 32.39 KG/M2

## 2020-06-20 DIAGNOSIS — E78.2 MIXED HYPERLIPIDEMIA: Chronic | ICD-10-CM

## 2020-06-20 DIAGNOSIS — I10 ESSENTIAL HYPERTENSION: Chronic | ICD-10-CM

## 2020-06-20 DIAGNOSIS — E11.9 TYPE 2 DIABETES MELLITUS WITHOUT COMPLICATION, WITHOUT LONG-TERM CURRENT USE OF INSULIN (HCC): Primary | Chronic | ICD-10-CM

## 2020-06-20 PROCEDURE — 3078F DIAST BP <80 MM HG: CPT | Performed by: INTERNAL MEDICINE

## 2020-06-20 PROCEDURE — 3075F SYST BP GE 130 - 139MM HG: CPT | Performed by: INTERNAL MEDICINE

## 2020-06-20 PROCEDURE — 1036F TOBACCO NON-USER: CPT | Performed by: INTERNAL MEDICINE

## 2020-06-20 PROCEDURE — 3060F POS MICROALBUMINURIA REV: CPT | Performed by: INTERNAL MEDICINE

## 2020-06-20 PROCEDURE — 99214 OFFICE O/P EST MOD 30 MIN: CPT | Performed by: INTERNAL MEDICINE

## 2020-06-20 PROCEDURE — 4040F PNEUMOC VAC/ADMIN/RCVD: CPT | Performed by: INTERNAL MEDICINE

## 2020-06-20 PROCEDURE — 4010F ACE/ARB THERAPY RXD/TAKEN: CPT | Performed by: INTERNAL MEDICINE

## 2020-06-20 PROCEDURE — 3008F BODY MASS INDEX DOCD: CPT | Performed by: INTERNAL MEDICINE

## 2020-06-20 PROCEDURE — 3044F HG A1C LEVEL LT 7.0%: CPT | Performed by: INTERNAL MEDICINE

## 2020-06-20 PROCEDURE — 2022F DILAT RTA XM EVC RTNOPTHY: CPT | Performed by: INTERNAL MEDICINE

## 2020-06-20 PROCEDURE — 1160F RVW MEDS BY RX/DR IN RCRD: CPT | Performed by: INTERNAL MEDICINE

## 2020-06-20 RX ORDER — METFORMIN HYDROCHLORIDE 500 MG/1
500 TABLET, EXTENDED RELEASE ORAL 2 TIMES DAILY
Qty: 180 TABLET | Refills: 2 | Status: SHIPPED | OUTPATIENT
Start: 2020-06-20

## 2020-06-20 RX ORDER — METOPROLOL SUCCINATE 100 MG/1
100 TABLET, EXTENDED RELEASE ORAL DAILY
Qty: 90 TABLET | Refills: 2 | Status: SHIPPED | OUTPATIENT
Start: 2020-06-20

## 2020-06-20 RX ORDER — PRAVASTATIN SODIUM 20 MG
20 TABLET ORAL DAILY
Qty: 90 TABLET | Refills: 2 | Status: SHIPPED | OUTPATIENT
Start: 2020-06-20

## 2020-06-20 RX ORDER — AMLODIPINE BESYLATE 10 MG/1
10 TABLET ORAL DAILY
Qty: 90 TABLET | Refills: 1 | Status: SHIPPED | OUTPATIENT
Start: 2020-06-20

## 2020-06-20 RX ORDER — HYDROCHLOROTHIAZIDE 25 MG/1
25 TABLET ORAL DAILY
Qty: 90 TABLET | Refills: 2 | Status: SHIPPED | OUTPATIENT
Start: 2020-06-20

## 2020-06-22 ENCOUNTER — TELEPHONE (OUTPATIENT)
Dept: ADMINISTRATIVE | Facility: OTHER | Age: 85
End: 2020-06-22

## 2020-06-22 NOTE — TELEPHONE ENCOUNTER
Upon review of your request/inquiry, we have found as a result of outreach that patient did not have the requested item(s) completed  (Last Diabetic Eye exam DOS 6-)    Any additional questions or concerns should be emailed to the Practice Liaisons via [email or web address] email, please do not reply via In Basket       Thank you  Joanna Wynne MA

## 2020-06-22 NOTE — TELEPHONE ENCOUNTER
Upon review of the In Basket request and the patient's chart, initial outreach has been made via fax, please see Contacts section for details  A second outreach attempt will be made within 5 business days      Thank you  Jean-Claude Wright MA

## 2020-06-22 NOTE — LETTER
Diabetic Foot Exam Form    Date Requested: 20  Patient: Marley Tenorio  Patient : 1935   Referring Provider: Anabell Sifuentes MD    Diabetic Foot Exam Performed with shoes and socks removed        Yes         No     Date of Diabetic Foot Exam ______________________________  Risk Score ____________________________________________    Left Foot       Visual Inspection         Monofilament Testing Sensory Exam        Pedal Pulses         Additional Comments         Right Foot      Visual Inspection         Monofilament Testing Sensory Exam       Pedal Pulses         Additional Comments         Comments __________________________________________________________    Practice Providing Exam ______________________________________________    Exam Performed By (print name) _______________________________________      Provider Signature ___________________________________________________      These reports are needed for  compliance    Please fax this completed form and a copy of the Diabetic Foot Exam report to our office located at [place] [address] as soon as possible to [phone] attention Jacey: Phone [phone]    We thank you for your assistance in treating our mutual patient     (sent to Dr Tayler CHRIS - [facility])

## 2020-06-22 NOTE — TELEPHONE ENCOUNTER
----- Message from Jayden Atkinson sent at 6/19/2020  2:41 PM EDT -----  Regarding: FOOT EXAM AND EYE EXAM [facility] INTERNAL MED  Contact: [phone]  06/19/20 2:41 PM    Hello, our patient Christophe Freed has had Diabetic Eye Exam and Diabetic Foot Exam completed/performed  Please assist in updating the patient chart by making an External outreach to [address]  Per pt this month [phone]   EYE EXAM- per pt [address] P#[phone]   per opt last was 6 months ago     Thank you,  Fiorella Troncoso MA  [facility]  [address]

## 2020-06-22 NOTE — LETTER
Diabetic Eye Exam Form    Date Requested: 20  Patient: Amee Cheung  Patient : 1935   Referring Provider: Patrice León MD    Dilated Retinal Exam, Optomap-Iris Exam, or Fundus Photography Done         Yes ([place] one above)         No     Date of Diabetic Eye Exam ______________________________  Left Eye      Exam did show retinopathy    Exam did not show retinopathy         Mild       Moderate       None       Proliferative       Severe     Right Eye     Exam did show retinopathy    Exam did not show retinopathy         Mild       Moderate       None       Proliferative       Severe     Comments __________________________________________________________    Practice Providing Exam ______________________________________________    Exam Performed By (print name) _______________________________________      Provider Signature ___________________________________________________      These reports are needed for  compliance    Please fax this completed form and a copy of the Diabetic Eye Exam report to our office located at [place] [address] as soon as possible to [phone] attention Jacey: Phone [phone]    We thank you for your assistance in treating our mutual patient     (sent to [facility])

## 2020-07-06 NOTE — TELEPHONE ENCOUNTER
As a follow-up, a second attempt has been made for outreach via call, please see Contacts section for details  A third and final attempt will be made within 5 business days      Thank you  Isabella Colby MA

## 2020-07-07 NOTE — TELEPHONE ENCOUNTER
Upon review of your request/inquiry, we have found/obtained the documentation  After careful review of the document we are unable to complete this request for Diabetic Foot Exam because the documentation does not have the proper verbiage (wording) needed to close the requested care gap(s)  Any additional questions or concerns should be emailed to the Practice Liaisons via [email or web address] email, please do not reply via In Basket       Thank you  Cristofer Joel MA

## 2020-09-08 DIAGNOSIS — I10 ESSENTIAL HYPERTENSION: ICD-10-CM

## 2020-09-08 RX ORDER — AMLODIPINE BESYLATE 5 MG/1
TABLET ORAL
Qty: 90 TABLET | Refills: 2 | Status: SHIPPED | OUTPATIENT
Start: 2020-09-08

## 2023-02-20 ENCOUNTER — TELEPHONE (OUTPATIENT)
Dept: GASTROENTEROLOGY | Facility: CLINIC | Age: 88
End: 2023-02-20

## 2025-01-07 NOTE — TELEPHONE ENCOUNTER
Needs to be seen 1st
PT CALLED COMPLAINING ABOUT THE SAME PAIN IN HER STOMACH THAT SHE HAS HAD BEFORE  SHE WAS SEEN BY DR Maren David AFTER HER STAY AT [address] FOR THIS PAIN  SHE WAS PUT ON PANTOPRAZOLE(PROTONIX) 40 MG TABLET AND ALSO AN ANTIBIOTIC SHE IS NOT SURE WHAT IT WAS CALLED BUT IS REQUESTING TO GO BACK ON BOTH MEDICATIONS  I DID MAKE HER AN APPOINTMENT FOR JAN 16 TH AT 1015  WITH [facility]   CAN THIS BE ORDERED FOR HER? PLEASE CONTACT PT BACK TO LET HER KNOW AT  [phone]
Yes
